# Patient Record
Sex: MALE | Race: OTHER | Employment: PART TIME | URBAN - METROPOLITAN AREA
[De-identification: names, ages, dates, MRNs, and addresses within clinical notes are randomized per-mention and may not be internally consistent; named-entity substitution may affect disease eponyms.]

---

## 2018-07-27 ENCOUNTER — OFFICE VISIT (OUTPATIENT)
Dept: FAMILY MEDICINE CLINIC | Age: 48
End: 2018-07-27

## 2018-07-27 VITALS
DIASTOLIC BLOOD PRESSURE: 66 MMHG | BODY MASS INDEX: 23.9 KG/M2 | WEIGHT: 140 LBS | SYSTOLIC BLOOD PRESSURE: 99 MMHG | HEIGHT: 64 IN | TEMPERATURE: 98.6 F | HEART RATE: 76 BPM

## 2018-07-27 DIAGNOSIS — S48.912A AMPUTATION OF LEFT ARM (HCC): Primary | ICD-10-CM

## 2018-07-27 NOTE — PROGRESS NOTES
Coordination of Care 1. Have you been to the ER, urgent care clinic since your last visit? Hospitalized since your last visit? Yes When: 7/26/18  [facility]  Wound Care 2. Have you seen or consulted any other health care providers outside of the [address] [place] [address] since your last visit? Include any pap smears or colon screening. No 
 
Does the patient need refills? YES Learning Assessment Complete? yes No results found for this or any previous visit.

## 2018-07-27 NOTE — PROGRESS NOTES
AVS printed, reviewed and given. Discussed Access Now referral process and patient aware that he will have to meet with MAY Gutierrez to complete AN application to see Ortho. Copies made of [facility] ER visit, and will send to the office to be scanned in to patient's chart. Patient directed to registration to make appt. With julián and 2 week f/u appt. With provider. Patient verbalizes understanding.  Micheal Knapp RN
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
## 2018-07-31 ENCOUNTER — TELEPHONE (OUTPATIENT)
Dept: FAMILY MEDICINE CLINIC | Age: 48
End: 2018-07-31

## 2018-07-31 NOTE — TELEPHONE ENCOUNTER
----- Message from Azul Orellana MD sent at 7/27/2018  1:02 PM EDT -----  Regarding: needs xray before ortho appt, and need records from [facility] you help with getting the records while Zepeda Felicita is out?   I put in order for xray, forgot we had to get one for ortho referral.
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
## 2018-08-03 ENCOUNTER — TELEPHONE (OUTPATIENT)
Dept: FAMILY MEDICINE CLINIC | Age: 48
End: 2018-08-03

## 2018-08-14 NOTE — TELEPHONE ENCOUNTER
Attempted to reach by phone. Pt number \"not accepting calls\". Emergency contact phone number accepting and general message left asking him to have pt call [facility] office nurse. Letter done asking pt to call [facility] office nurse. Has scheduled appt on 8/16/18 w/ HINA Ivanna Cisneros. Routing to Afshan Handley and Ivanna Cisneros for review.

## 2018-08-16 ENCOUNTER — HOSPITAL ENCOUNTER (INPATIENT)
Age: 48
LOS: 8 days | Discharge: ANOTHER HOSPITAL | DRG: 565 | End: 2018-08-24
Attending: EMERGENCY MEDICINE | Admitting: INTERNAL MEDICINE
Payer: SELF-PAY

## 2018-08-16 ENCOUNTER — APPOINTMENT (OUTPATIENT)
Dept: CT IMAGING | Age: 48
DRG: 565 | End: 2018-08-16
Attending: EMERGENCY MEDICINE
Payer: SELF-PAY

## 2018-08-16 ENCOUNTER — OFFICE VISIT (OUTPATIENT)
Dept: FAMILY MEDICINE CLINIC | Age: 48
End: 2018-08-16

## 2018-08-16 ENCOUNTER — APPOINTMENT (OUTPATIENT)
Dept: GENERAL RADIOLOGY | Age: 48
DRG: 565 | End: 2018-08-16
Attending: EMERGENCY MEDICINE
Payer: SELF-PAY

## 2018-08-16 ENCOUNTER — HOSPITAL ENCOUNTER (OUTPATIENT)
Dept: LAB | Age: 48
Discharge: HOME | End: 2018-08-16

## 2018-08-16 VITALS
HEART RATE: 90 BPM | TEMPERATURE: 98.8 F | DIASTOLIC BLOOD PRESSURE: 64 MMHG | WEIGHT: 144 LBS | SYSTOLIC BLOOD PRESSURE: 118 MMHG | BODY MASS INDEX: 24.72 KG/M2

## 2018-08-16 DIAGNOSIS — M86.121 ACUTE OSTEOMYELITIS OF RIGHT UPPER ARM (HCC): Primary | ICD-10-CM

## 2018-08-16 DIAGNOSIS — S48.912A AMPUTATION OF LEFT ARM (HCC): ICD-10-CM

## 2018-08-16 DIAGNOSIS — T14.8XXA INFECTED WOUND: ICD-10-CM

## 2018-08-16 DIAGNOSIS — L08.9 INFECTED WOUND: Primary | ICD-10-CM

## 2018-08-16 DIAGNOSIS — L08.9 INFECTED WOUND: ICD-10-CM

## 2018-08-16 DIAGNOSIS — T14.8XXA INFECTED WOUND: Primary | ICD-10-CM

## 2018-08-16 PROBLEM — M86.9 OSTEOMYELITIS (HCC): Status: ACTIVE | Noted: 2018-08-16

## 2018-08-16 LAB
ALBUMIN SERPL-MCNC: 3.3 G/DL (ref 3.5–5)
ALBUMIN/GLOB SERPL: 0.8 {RATIO} (ref 1.1–2.2)
ALP SERPL-CCNC: 83 U/L (ref 45–117)
ALT SERPL-CCNC: 24 U/L (ref 12–78)
ANION GAP SERPL CALC-SCNC: 7 MMOL/L (ref 5–15)
AST SERPL-CCNC: 14 U/L (ref 15–37)
BASOPHILS # BLD: 0 K/UL (ref 0–0.1)
BASOPHILS NFR BLD: 0 % (ref 0–1)
BILIRUB SERPL-MCNC: 0.2 MG/DL (ref 0.2–1)
BUN SERPL-MCNC: 13 MG/DL (ref 6–20)
BUN/CREAT SERPL: 17 (ref 12–20)
CALCIUM SERPL-MCNC: 8.4 MG/DL (ref 8.5–10.1)
CHLORIDE SERPL-SCNC: 108 MMOL/L (ref 97–108)
CO2 SERPL-SCNC: 28 MMOL/L (ref 21–32)
COMMENT, HOLDF: NORMAL
CREAT SERPL-MCNC: 0.76 MG/DL (ref 0.7–1.3)
DIFFERENTIAL METHOD BLD: NORMAL
EOSINOPHIL # BLD: 0.1 K/UL (ref 0–0.4)
EOSINOPHIL NFR BLD: 2 % (ref 0–7)
ERYTHROCYTE [DISTWIDTH] IN BLOOD BY AUTOMATED COUNT: 14 % (ref 11.5–14.5)
GLOBULIN SER CALC-MCNC: 4.2 G/DL (ref 2–4)
GLUCOSE SERPL-MCNC: 100 MG/DL (ref 65–100)
HCT VFR BLD AUTO: 37.8 % (ref 36.6–50.3)
HGB BLD-MCNC: 12.2 G/DL (ref 12.1–17)
IMM GRANULOCYTES # BLD: 0 K/UL (ref 0–0.04)
IMM GRANULOCYTES NFR BLD AUTO: 0 % (ref 0–0.5)
LACTATE SERPL-SCNC: 0.8 MMOL/L (ref 0.4–2)
LYMPHOCYTES # BLD: 1.6 K/UL (ref 0.8–3.5)
LYMPHOCYTES NFR BLD: 33 % (ref 12–49)
MCH RBC QN AUTO: 29.7 PG (ref 26–34)
MCHC RBC AUTO-ENTMCNC: 32.3 G/DL (ref 30–36.5)
MCV RBC AUTO: 92 FL (ref 80–99)
MONOCYTES # BLD: 0.5 K/UL (ref 0–1)
MONOCYTES NFR BLD: 11 % (ref 5–13)
NEUTS SEG # BLD: 2.6 K/UL (ref 1.8–8)
NEUTS SEG NFR BLD: 54 % (ref 32–75)
NRBC # BLD: 0 K/UL (ref 0–0.01)
NRBC BLD-RTO: 0 PER 100 WBC
PLATELET # BLD AUTO: 296 K/UL (ref 150–400)
PMV BLD AUTO: 8.9 FL (ref 8.9–12.9)
POTASSIUM SERPL-SCNC: 3.5 MMOL/L (ref 3.5–5.1)
PROT SERPL-MCNC: 7.5 G/DL (ref 6.4–8.2)
RBC # BLD AUTO: 4.11 M/UL (ref 4.1–5.7)
SAMPLES BEING HELD,HOLD: NORMAL
SODIUM SERPL-SCNC: 143 MMOL/L (ref 136–145)
WBC # BLD AUTO: 4.8 K/UL (ref 4.1–11.1)

## 2018-08-16 PROCEDURE — 36415 COLL VENOUS BLD VENIPUNCTURE: CPT | Performed by: EMERGENCY MEDICINE

## 2018-08-16 PROCEDURE — 87205 SMEAR GRAM STAIN: CPT | Performed by: EMERGENCY MEDICINE

## 2018-08-16 PROCEDURE — 65270000029 HC RM PRIVATE

## 2018-08-16 PROCEDURE — 87077 CULTURE AEROBIC IDENTIFY: CPT | Performed by: EMERGENCY MEDICINE

## 2018-08-16 PROCEDURE — 87205 SMEAR GRAM STAIN: CPT | Performed by: FAMILY MEDICINE

## 2018-08-16 PROCEDURE — 74011636320 HC RX REV CODE- 636/320: Performed by: EMERGENCY MEDICINE

## 2018-08-16 PROCEDURE — 73201 CT UPPER EXTREMITY W/DYE: CPT

## 2018-08-16 PROCEDURE — 74011250636 HC RX REV CODE- 250/636: Performed by: INTERNAL MEDICINE

## 2018-08-16 PROCEDURE — 80053 COMPREHEN METABOLIC PANEL: CPT | Performed by: EMERGENCY MEDICINE

## 2018-08-16 PROCEDURE — 83605 ASSAY OF LACTIC ACID: CPT | Performed by: EMERGENCY MEDICINE

## 2018-08-16 PROCEDURE — 74011250636 HC RX REV CODE- 250/636: Performed by: EMERGENCY MEDICINE

## 2018-08-16 PROCEDURE — 99285 EMERGENCY DEPT VISIT HI MDM: CPT

## 2018-08-16 PROCEDURE — 74011250637 HC RX REV CODE- 250/637: Performed by: INTERNAL MEDICINE

## 2018-08-16 PROCEDURE — 87077 CULTURE AEROBIC IDENTIFY: CPT | Performed by: FAMILY MEDICINE

## 2018-08-16 PROCEDURE — 73020 X-RAY EXAM OF SHOULDER: CPT

## 2018-08-16 PROCEDURE — 87186 SC STD MICRODIL/AGAR DIL: CPT | Performed by: EMERGENCY MEDICINE

## 2018-08-16 PROCEDURE — 87040 BLOOD CULTURE FOR BACTERIA: CPT | Performed by: EMERGENCY MEDICINE

## 2018-08-16 PROCEDURE — 87186 SC STD MICRODIL/AGAR DIL: CPT | Performed by: FAMILY MEDICINE

## 2018-08-16 PROCEDURE — 74011000258 HC RX REV CODE- 258: Performed by: INTERNAL MEDICINE

## 2018-08-16 PROCEDURE — 74011636320 HC RX REV CODE- 636/320

## 2018-08-16 PROCEDURE — 85025 COMPLETE CBC W/AUTO DIFF WBC: CPT | Performed by: EMERGENCY MEDICINE

## 2018-08-16 RX ORDER — DIPHENHYDRAMINE HCL 25 MG
25 CAPSULE ORAL
Status: DISCONTINUED | OUTPATIENT
Start: 2018-08-16 | End: 2018-08-24 | Stop reason: HOSPADM

## 2018-08-16 RX ORDER — SODIUM CHLORIDE 0.9 % (FLUSH) 0.9 %
5-10 SYRINGE (ML) INJECTION AS NEEDED
Status: DISCONTINUED | OUTPATIENT
Start: 2018-08-16 | End: 2018-08-24 | Stop reason: HOSPADM

## 2018-08-16 RX ORDER — ONDANSETRON 2 MG/ML
4 INJECTION INTRAMUSCULAR; INTRAVENOUS
Status: DISCONTINUED | OUTPATIENT
Start: 2018-08-16 | End: 2018-08-24 | Stop reason: HOSPADM

## 2018-08-16 RX ORDER — ACETAMINOPHEN 325 MG/1
650 TABLET ORAL
Status: DISCONTINUED | OUTPATIENT
Start: 2018-08-16 | End: 2018-08-24 | Stop reason: HOSPADM

## 2018-08-16 RX ORDER — HEPARIN SODIUM 5000 [USP'U]/ML
5000 INJECTION, SOLUTION INTRAVENOUS; SUBCUTANEOUS EVERY 8 HOURS
Status: DISCONTINUED | OUTPATIENT
Start: 2018-08-16 | End: 2018-08-22

## 2018-08-16 RX ORDER — NALOXONE HYDROCHLORIDE 0.4 MG/ML
0.4 INJECTION, SOLUTION INTRAMUSCULAR; INTRAVENOUS; SUBCUTANEOUS AS NEEDED
Status: DISCONTINUED | OUTPATIENT
Start: 2018-08-16 | End: 2018-08-24 | Stop reason: HOSPADM

## 2018-08-16 RX ORDER — SODIUM CHLORIDE 0.9 % (FLUSH) 0.9 %
5-10 SYRINGE (ML) INJECTION EVERY 8 HOURS
Status: DISCONTINUED | OUTPATIENT
Start: 2018-08-16 | End: 2018-08-24 | Stop reason: HOSPADM

## 2018-08-16 RX ORDER — HYDROCODONE BITARTRATE AND ACETAMINOPHEN 5; 325 MG/1; MG/1
1 TABLET ORAL
Status: DISCONTINUED | OUTPATIENT
Start: 2018-08-16 | End: 2018-08-24 | Stop reason: HOSPADM

## 2018-08-16 RX ORDER — OXYCODONE AND ACETAMINOPHEN 5; 325 MG/1; MG/1
TABLET ORAL
Refills: 0 | COMMUNITY
Start: 2018-07-30 | End: 2018-08-24

## 2018-08-16 RX ORDER — METRONIDAZOLE 500 MG/1
500 TABLET ORAL 3 TIMES DAILY
COMMUNITY
End: 2018-08-24

## 2018-08-16 RX ADMIN — Medication 10 ML: at 21:53

## 2018-08-16 RX ADMIN — SODIUM CHLORIDE 3.38 G: 900 INJECTION, SOLUTION INTRAVENOUS at 17:06

## 2018-08-16 RX ADMIN — IOPAMIDOL 100 ML: 612 INJECTION, SOLUTION INTRAVENOUS at 15:47

## 2018-08-16 RX ADMIN — HYDROCODONE BITARTRATE AND ACETAMINOPHEN 1 TABLET: 5; 325 TABLET ORAL at 21:57

## 2018-08-16 RX ADMIN — IOPAMIDOL 100 ML: 612 INJECTION, SOLUTION INTRAVENOUS at 15:45

## 2018-08-16 RX ADMIN — VANCOMYCIN HYDROCHLORIDE 1500 MG: 10 INJECTION, POWDER, LYOPHILIZED, FOR SOLUTION INTRAVENOUS at 17:37

## 2018-08-16 RX ADMIN — HYDROCODONE BITARTRATE AND ACETAMINOPHEN 1 TABLET: 5; 325 TABLET ORAL at 18:59

## 2018-08-16 RX ADMIN — HEPARIN SODIUM 5000 UNITS: 5000 INJECTION INTRAVENOUS; SUBCUTANEOUS at 21:51
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
## 2018-08-16 NOTE — PROGRESS NOTES
[address] Dosing Services: Antimicrobial Stewardship Progress Note  Auto-consult for Vancomycin dosiing  Physician: Dr Tereso Rodriguez  Indication: Osteomyelitis  Day of Therapy: 1    Plan:  Vancomycin therapy:  Loading dose: Vancomycin 1500 mg IV x1 dose in ED  Maintenance dose: Vancomycin 1000 mg IV q12hr  Dose calculated to approximate a therapeutic trough of 15-20 mcg/mL. Last trough level / Plan for level: after 3rd dose  Pharmacy to follow daily and will make changes to dose and/or frequency based on clinical status. Other Antimicrobial  (not dosed by pharmacist)   Zosyn 3.375 gm IV q8hr   Cultures     Arm and Blood cultures pending   Serum Creatinine     Lab Results   Component Value Date/Time    Creatinine 0.76 08/16/2018 02:08 PM       Creatinine Clearance Estimated Creatinine Clearance: 97.4 mL/min (based on Cr of 0.76).      Temp   98.3 °F (36.8 °C)    WBC   Lab Results   Component Value Date/Time    WBC 4.8 08/16/2018 02:08 PM       H/H   Lab Results   Component Value Date/Time    HGB 12.2 08/16/2018 02:08 PM        Platelets   Lab Results   Component Value Date/Time    PLATELET 609 00/93/1758 02:08 PM        Pharmacist: Signed Dick Nowakt Cancino Contact information: [phone]

## 2018-08-16 NOTE — ROUTINE PROCESS
1825- TRANSFER - IN REPORT:    Verbal report received from Francia Martinez RN (name) on Giuliano Landa  being received from ED (unit) for routine progression of care      Report consisted of patients Situation, Background, Assessment and   Recommendations(SBAR). Information from the following report(s) SBAR, Kardex, Intake/Output, MAR and Recent Results was reviewed with the receiving nurse. Opportunity for questions and clarification was provided. Assessment completed upon patients arrival to unit and care assumed. Molly Monique RN     Primary Nurse Molly Monique and Roz Aguilar RN performed a dual skin assessment on this patient Impairment noted- see wound doc flow sheet  [place] [id number] [facility], RN           8290- Bedside and Verbal shift change report given to YARELIS Jackson  (oncoming nurse) by Molly Monique RN and Roz Aguilar RN (offgoing nurse). Report included the following information SBAR, Kardex, Intake/Output, MAR, Recent Results and Cardiac Rhythm NSR.  [facility]

## 2018-08-16 NOTE — PROGRESS NOTES
Coordination of Care  1. Have you been to the ER, urgent care clinic since your last visit? Hospitalized since your last visit? Yes When: 7/29/18 [facility]    2. Have you seen or consulted any other health care providers outside of the [address] [place] [address] since your last visit? Include any pap smears or colon screening. No    Does the patient need refills? N/A    Learning Assessment Complete?  yes
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
## 2018-08-16 NOTE — PROGRESS NOTES
[facility]: MED RECONCILIATION    Information obtained from: Patient and his medication bottles     Allergies: Review of patient's allergies indicates no known allergies. Comment:  - Metronidazole 500 mg TID : Patient had a bottle filled at [facility] in [place], [place] on 7-17-18  for 93 tablets. Bottle was half full. So he might not have been taking it as prescribed. Prior to Admission Medications:     Medication Documentation Review Audit       Reviewed by Ruslan Waggoner PHARMD (Pharmacist) on 08/16/18 at 1725         Medication Sig Documenting Provider Last Dose Status Taking?      metroNIDAZOLE (FLAGYL) 500 mg tablet Take 500 mg by mouth three (3) times daily.  Historical Provider  Active Yes    oxyCODONE-acetaminophen (PERCOCET) 5-325 mg per tablet TK 1 TO 2 TS PO Q 4 TO 6 H PRN P Historical Provider  Active Yes                                     BRIAN WetzelD   Contact: [phone]

## 2018-08-17 LAB
BUN SERPL-MCNC: 11 MG/DL (ref 6–20)
CREAT SERPL-MCNC: 0.78 MG/DL (ref 0.7–1.3)

## 2018-08-17 PROCEDURE — 77030011254 HC DRSG HYDRGEL S&N -A

## 2018-08-17 PROCEDURE — 82565 ASSAY OF CREATININE: CPT | Performed by: INTERNAL MEDICINE

## 2018-08-17 PROCEDURE — 65270000029 HC RM PRIVATE

## 2018-08-17 PROCEDURE — 74011000258 HC RX REV CODE- 258: Performed by: INTERNAL MEDICINE

## 2018-08-17 PROCEDURE — 84520 ASSAY OF UREA NITROGEN: CPT | Performed by: INTERNAL MEDICINE

## 2018-08-17 PROCEDURE — 77030011251 HC DRSG HYDRCOIL S&N -A

## 2018-08-17 PROCEDURE — 36415 COLL VENOUS BLD VENIPUNCTURE: CPT | Performed by: INTERNAL MEDICINE

## 2018-08-17 PROCEDURE — 74011250636 HC RX REV CODE- 250/636: Performed by: INTERNAL MEDICINE

## 2018-08-17 PROCEDURE — 74011250637 HC RX REV CODE- 250/637: Performed by: INTERNAL MEDICINE

## 2018-08-17 RX ADMIN — HEPARIN SODIUM 5000 UNITS: 5000 INJECTION INTRAVENOUS; SUBCUTANEOUS at 05:27

## 2018-08-17 RX ADMIN — Medication 10 ML: at 14:10

## 2018-08-17 RX ADMIN — HEPARIN SODIUM 5000 UNITS: 5000 INJECTION INTRAVENOUS; SUBCUTANEOUS at 22:57

## 2018-08-17 RX ADMIN — Medication 10 ML: at 23:03

## 2018-08-17 RX ADMIN — HEPARIN SODIUM 5000 UNITS: 5000 INJECTION INTRAVENOUS; SUBCUTANEOUS at 14:09

## 2018-08-17 RX ADMIN — SODIUM CHLORIDE 3.38 G: 900 INJECTION, SOLUTION INTRAVENOUS at 08:53

## 2018-08-17 RX ADMIN — SODIUM CHLORIDE 3.38 G: 900 INJECTION, SOLUTION INTRAVENOUS at 00:36

## 2018-08-17 RX ADMIN — VANCOMYCIN HYDROCHLORIDE 1000 MG: 1 INJECTION, POWDER, LYOPHILIZED, FOR SOLUTION INTRAVENOUS at 05:25

## 2018-08-17 RX ADMIN — HYDROCODONE BITARTRATE AND ACETAMINOPHEN 1 TABLET: 5; 325 TABLET ORAL at 09:01

## 2018-08-17 RX ADMIN — HYDROCODONE BITARTRATE AND ACETAMINOPHEN 1 TABLET: 5; 325 TABLET ORAL at 23:02

## 2018-08-17 RX ADMIN — Medication 10 ML: at 05:35

## 2018-08-17 RX ADMIN — VANCOMYCIN HYDROCHLORIDE 1000 MG: 1 INJECTION, POWDER, LYOPHILIZED, FOR SOLUTION INTRAVENOUS at 17:55

## 2018-08-17 RX ADMIN — SODIUM CHLORIDE 3.38 G: 900 INJECTION, SOLUTION INTRAVENOUS at 18:55

## 2018-08-17 RX ADMIN — HYDROCODONE BITARTRATE AND ACETAMINOPHEN 1 TABLET: 5; 325 TABLET ORAL at 05:27

## 2018-08-17 NOTE — WOUND CARE
Wound care consult to assess the left upper arm residual limb present on admission. Plan of care and assesment discussed with Ortho NP - NOÉ Rai prior to care given. Language line used to communicate plan of care with patient- verbalized understanding and questions answered. Assessment  Left upper residual arm limb- skin graft to anterior and posterior surgical wound- anterior area is dry with large areas of dry adherent thick tan scabs, areas of red hypergranulated tissue scattered- sanguinous drainage- upper arm at the posterior axilla has a small area- less than 2x2 cm with dry black adherent eshar- edges area moist and lifting, yellow drainage- no odor. Posterior graft is red and granulating, edges pink, no drainage- yung wound is scarred. Treatment  Wounds cleansed, moisturized- intrasite to all open areas- covered with adaptic and dry dressing - tolerated well. Orders written for daily wound care per NP orders. Staff advised of care given. Will follow up on 8/20    Recommendations-   Refer to wound care clinic for long term care and assessment of wound.   Daily wound care as ordered/  [address]

## 2018-08-17 NOTE — PROGRESS NOTES
Problem: Falls - Risk of  Goal: *Absence of Falls  Document Noemy Fall Risk and appropriate interventions in the flowsheet.    Outcome: Progressing Towards Goal  Fall Risk Interventions:            Medication Interventions: Bed/chair exit alarm, Patient to call before getting OOB, Teach patient to arise slowly

## 2018-08-17 NOTE — PROGRESS NOTES
Bedside and Verbal shift change report given to YARELIS Vicente (oncoming nurse) by Ana Marin (offgoing nurse). Report included the following information SBAR and Kardex.
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
## 2018-08-17 NOTE — PROGRESS NOTES
Bedside and Verbal shift change report given to Edgar Gaston RN (oncoming nurse) by Anali Inman RN (offgoing nurse). Report included the following information SBAR, Kardex, Intake/Output, MAR, Accordion, Recent Results and Med Rec Status.
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
## 2018-08-18 LAB
BACTERIA SPEC CULT: ABNORMAL
BUN SERPL-MCNC: 8 MG/DL (ref 6–20)
CREAT SERPL-MCNC: 0.6 MG/DL (ref 0.7–1.3)
DATE LAST DOSE: NORMAL
GRAM STN SPEC: ABNORMAL
GRAM STN SPEC: ABNORMAL
REPORTED DOSE,DOSE: NORMAL UNITS
REPORTED DOSE/TIME,TMG: NORMAL
SERVICE CMNT-IMP: ABNORMAL
VANCOMYCIN TROUGH SERPL-MCNC: 7.2 UG/ML (ref 5–10)

## 2018-08-18 PROCEDURE — 74011250636 HC RX REV CODE- 250/636: Performed by: INTERNAL MEDICINE

## 2018-08-18 PROCEDURE — 74011000258 HC RX REV CODE- 258: Performed by: INTERNAL MEDICINE

## 2018-08-18 PROCEDURE — 65270000029 HC RM PRIVATE

## 2018-08-18 PROCEDURE — 84520 ASSAY OF UREA NITROGEN: CPT | Performed by: INTERNAL MEDICINE

## 2018-08-18 PROCEDURE — 36415 COLL VENOUS BLD VENIPUNCTURE: CPT | Performed by: INTERNAL MEDICINE

## 2018-08-18 PROCEDURE — 74011250637 HC RX REV CODE- 250/637: Performed by: INTERNAL MEDICINE

## 2018-08-18 PROCEDURE — 80202 ASSAY OF VANCOMYCIN: CPT | Performed by: INTERNAL MEDICINE

## 2018-08-18 PROCEDURE — 82565 ASSAY OF CREATININE: CPT | Performed by: INTERNAL MEDICINE

## 2018-08-18 RX ADMIN — HEPARIN SODIUM 5000 UNITS: 5000 INJECTION INTRAVENOUS; SUBCUTANEOUS at 06:43

## 2018-08-18 RX ADMIN — VANCOMYCIN HYDROCHLORIDE 1000 MG: 1 INJECTION, POWDER, LYOPHILIZED, FOR SOLUTION INTRAVENOUS at 23:11

## 2018-08-18 RX ADMIN — SODIUM CHLORIDE 3.38 G: 900 INJECTION, SOLUTION INTRAVENOUS at 01:05

## 2018-08-18 RX ADMIN — VANCOMYCIN HYDROCHLORIDE 1000 MG: 1 INJECTION, POWDER, LYOPHILIZED, FOR SOLUTION INTRAVENOUS at 06:41

## 2018-08-18 RX ADMIN — HYDROCODONE BITARTRATE AND ACETAMINOPHEN 1 TABLET: 5; 325 TABLET ORAL at 06:43

## 2018-08-18 RX ADMIN — VANCOMYCIN HYDROCHLORIDE 1000 MG: 1 INJECTION, POWDER, LYOPHILIZED, FOR SOLUTION INTRAVENOUS at 14:11

## 2018-08-18 RX ADMIN — Medication 10 ML: at 23:12

## 2018-08-18 RX ADMIN — Medication 10 ML: at 14:15

## 2018-08-18 RX ADMIN — SODIUM CHLORIDE 3.38 G: 900 INJECTION, SOLUTION INTRAVENOUS at 08:16

## 2018-08-18 RX ADMIN — HEPARIN SODIUM 5000 UNITS: 5000 INJECTION INTRAVENOUS; SUBCUTANEOUS at 14:15

## 2018-08-18 RX ADMIN — SODIUM CHLORIDE 3.38 G: 900 INJECTION, SOLUTION INTRAVENOUS at 17:17

## 2018-08-18 RX ADMIN — HEPARIN SODIUM 5000 UNITS: 5000 INJECTION INTRAVENOUS; SUBCUTANEOUS at 23:11

## 2018-08-18 RX ADMIN — Medication 10 ML: at 06:43

## 2018-08-18 NOTE — PROGRESS NOTES
Dontrell Mcintosh [facility] 79  566 [facility], [address] [place] [address]  [phone]      Medical Progress Note      NAME: Gregorio Pretty   :  1970  MRM:  [id number]    Date/Time: 2018  8:02 AM       Assessment and Plan:   Osteomyelitis of of LT humeral bone s/p traumatic amputation. Continue empiric zosyn and Vanco. BCx clear. MRSA screen pending. Wound Cx with heavy GNRs. Ortho consult appreciated. ID consult on Monday to help determine abx duration.          Subjective:     Chief Complaint:  Patient seen and examined. Chart reviewed. Patient interviewed using [employer] phone. He complains of ongoing left arm stump pain. ROS:  (bold if positive, if negative)      Tolerating PT  Tolerating Diet        Objective:     Last 24hrs VS reviewed since prior progress note. Most recent are:    Visit Vitals    /61 (BP 1 Location: Right arm, BP Patient Position: At rest)    Pulse 76    Temp 99.2 °F (37.3 °C)    Resp 16    Ht 5' 4\" (1.626 m)    Wt 65.3 kg (144 lb)    SpO2 98%    BMI 24.72 kg/m2     SpO2 Readings from Last 6 Encounters:   18 98%            Intake/Output Summary (Last 24 hours) at 18 0824  Last data filed at 18 0651   Gross per 24 hour   Intake              700 ml   Output                0 ml   Net              700 ml        Physical Exam:    Gen:  Well-developed, well-nourished, in no acute distress  HEENT:  Pink conjunctivae, PERRL, hearing intact to voice, moist mucous membranes  Neck:  Supple, without masses, thyroid non-tender  Resp:  No accessory muscle use, clear breath sounds without wheezes rales or rhonchi  Card:  No murmurs, normal S1, S2 without thrills, bruits or peripheral edema  Abd:  Soft, non-tender, non-distended, normoactive bowel sounds are present, no palpable organomegaly and no detectable hernias  Lymph:  No cervical or inguinal adenopathy  Musc:  LT humerus amputation.    Skin:  No rashes or ulcers, skin turgor is good  Neuro: Cranial nerves are grossly intact, no focal motor weakness, follows commands appropriately  Psych:  Good insight, oriented to person, place and time, alert  __________________________________________________________________  Medications Reviewed: (see below)  Medications:     Current Facility-Administered Medications   Medication Dose Route Frequency    Vancomycin - Please draw trough prior to dose due on 8/18 at 0600. Thanks! Other ONCE    piperacillin-tazobactam (ZOSYN) 3.375 g in 0.9% sodium chloride (MBP/ADV) 100 mL ADV  3.375 g IntraVENous Q8H    sodium chloride (NS) flush 5-10 mL  5-10 mL IntraVENous Q8H    sodium chloride (NS) flush 5-10 mL  5-10 mL IntraVENous PRN    acetaminophen (TYLENOL) tablet 650 mg  650 mg Oral Q4H PRN    HYDROcodone-acetaminophen (NORCO) 5-325 mg per tablet 1 Tab  1 Tab Oral Q4H PRN    diphenhydrAMINE (BENADRYL) capsule 25 mg  25 mg Oral Q4H PRN    naloxone (NARCAN) injection 0.4 mg  0.4 mg IntraVENous PRN    ondansetron (ZOFRAN) injection 4 mg  4 mg IntraVENous Q4H PRN    heparin (porcine) injection 5,000 Units  5,000 Units SubCUTAneous Q8H    vancomycin (VANCOCIN) 1,000 mg in 0.9% sodium chloride (MBP/ADV) 250 mL  1,000 mg IntraVENous Q12H        Lab Data Reviewed: (see below)  Lab Review:     Recent Labs      08/16/18   1408   WBC  4.8   HGB  12.2   HCT  37.8   PLT  296     Recent Labs      08/18/18   0510  08/17/18   0514  08/16/18   1408   NA   --    --   143   K   --    --   3.5   CL   --    --   108   CO2   --    --   28   GLU   --    --   100   BUN  8  11  13   CREA  0.60*  0.78  0.76   CA   --    --   8.4*   ALB   --    --   3.3*   TBILI   --    --   0.2   SGOT   --    --   14*   ALT   --    --   24     No results found for: GLUCPOC  No results for input(s): PH, PCO2, PO2, HCO3, FIO2 in the last 72 hours. No results for input(s): INR in the last 72 hours.     No lab exists for component: INREXT, INREXT  All Micro Results     Procedure Component Value Units Date/Time    CULTURE, Gris Ling STAIN [[id number]]  (Abnormal) Collected:  08/16/18 1741    Order Status:  Completed Specimen:  Wound from Arm Updated:  08/17/18 1326     Special Requests: LOOK FOR MRSA     GRAM STAIN NO WBC'S SEEN         NO ORGANISMS SEEN        Culture result:         LIGHT GRAM NEGATIVE RODS (A)    CULTURE, BLOOD [[id number]] Collected:  08/16/18 1412    Order Status:  Completed Specimen:  Blood from Blood Updated:  08/17/18 0816     Special Requests: NO SPECIAL REQUESTS        Culture result: NO GROWTH AFTER 17 HOURS       CULTURE, MRSA [[id number]]     Order Status:  Canceled Specimen:  Nares           I have reviewed notes of prior 24hr. Other pertinent lab:       Total time spent with patient: [address] discussed with: Patient, Nursing Staff and >50% of time spent in counseling and coordination of care    Discussed:  Care Plan    Prophylaxis:  SCD's    Disposition:  Home w/Family           ___________________________________________________    Attending Physician: Philly Elizabeth DO

## 2018-08-18 NOTE — PROGRESS NOTES
Orthopaedic Progress Note  Post Op day: * No surgery found *    2018 11:13 AM     Patient: Meg Paulson MRN: [id number]  SSN: [id number]    YOB: 1970  Age: 46 y.o. Sex: male      Admit date:  2018  Date of Surgery:  * No surgery found *   Procedures:    Admitting Physician:  Robert Farah MD   Surgeon:  * Surgery not found *    Consulting Physician(s): Treatment Team: Attending Provider: Jim Kraus DO; Consulting Provider: Robert Farah MD; Consulting Provider: Amberly Mcdermott NP; Consulting Provider: Sofia Valenzuela MD; Care Manager: Mary Bolden    SUBJECTIVE:     Meg Paulson is a 46 y.o. male resting in bed, eating breakfast with minimal complaints of pain. OBJECTIVE:       Physical Exam:  General: Alert, cooperative, no distress. Musculoskeletal: Left stump dressing removed. Dry scaly, sloughed skin better today after wound care yesterday. Several areas of hypergranulation/ no purulent drainage noted. Wound care done.      Vital Signs:      Patient Vitals for the past 8 hrs:   BP Temp Pulse Resp SpO2   18 0831 113/77 98.2 °F (36.8 °C) 63 16 99 %   18 0333 111/61 99.2 °F (37.3 °C) 76 16 98 %   18 0327 114/63 99.1 °F (37.3 °C) 74 15 96 %                                          Temp (24hrs), Av.8 °F (37.1 °C), Min:98.2 °F (36.8 °C), Max:99.4 °F (37.4 °C)      Labs:        Recent Labs      18   1408   HCT  37.8   HGB  12.2     Lab Results   Component Value Date/Time    Sodium 143 2018 02:08 PM    Potassium 3.5 2018 02:08 PM    Chloride 108 2018 02:08 PM    CO2 28 2018 02:08 PM    Glucose 100 2018 02:08 PM    BUN 8 2018 05:10 AM    Creatinine 0.60 (L) 2018 05:10 AM    Calcium 8.4 (L) 2018 02:08 PM       PT/OT:                Patient mobility                         ASSESSMENT / PLAN:   Principal Problem:    Osteomyelitis ([address] (2018)          Left stump wound improving with wound care and IV abx  No Orthopedic surgical needs at this time. IV abx ongoing/ daily wound care  Will need outpatient wound care clinic  ID to see Monday for antibiotics recommendations. Will sign off, please reconsult as needed.                  Signed By:  Richi Gutierrez NP    Orthopedic [place]  [facility]

## 2018-08-18 NOTE — PROGRESS NOTES
Bedside and Verbal shift change report given to YARELIS Gómez (oncoming nurse) by Warner KEE RN(offgoing nurse). Report included the following information SBAR.

## 2018-08-19 LAB
ANION GAP SERPL CALC-SCNC: 6 MMOL/L (ref 5–15)
BACTERIA SPEC CULT: ABNORMAL
BUN SERPL-MCNC: 6 MG/DL (ref 6–20)
BUN/CREAT SERPL: 10 (ref 12–20)
CALCIUM SERPL-MCNC: 8.6 MG/DL (ref 8.5–10.1)
CHLORIDE SERPL-SCNC: 106 MMOL/L (ref 97–108)
CO2 SERPL-SCNC: 27 MMOL/L (ref 21–32)
CREAT SERPL-MCNC: 0.58 MG/DL (ref 0.7–1.3)
GLUCOSE SERPL-MCNC: 101 MG/DL (ref 65–100)
GRAM STN SPEC: ABNORMAL
GRAM STN SPEC: ABNORMAL
POTASSIUM SERPL-SCNC: 3.5 MMOL/L (ref 3.5–5.1)
SERVICE CMNT-IMP: ABNORMAL
SODIUM SERPL-SCNC: 139 MMOL/L (ref 136–145)

## 2018-08-19 PROCEDURE — 80048 BASIC METABOLIC PNL TOTAL CA: CPT | Performed by: INTERNAL MEDICINE

## 2018-08-19 PROCEDURE — 74011250637 HC RX REV CODE- 250/637: Performed by: INTERNAL MEDICINE

## 2018-08-19 PROCEDURE — 74011000258 HC RX REV CODE- 258: Performed by: INTERNAL MEDICINE

## 2018-08-19 PROCEDURE — 36415 COLL VENOUS BLD VENIPUNCTURE: CPT | Performed by: INTERNAL MEDICINE

## 2018-08-19 PROCEDURE — 65270000029 HC RM PRIVATE

## 2018-08-19 PROCEDURE — 74011250636 HC RX REV CODE- 250/636: Performed by: INTERNAL MEDICINE

## 2018-08-19 RX ADMIN — HYDROCODONE BITARTRATE AND ACETAMINOPHEN 1 TABLET: 5; 325 TABLET ORAL at 21:10

## 2018-08-19 RX ADMIN — Medication 10 ML: at 21:11

## 2018-08-19 RX ADMIN — Medication 10 ML: at 06:02

## 2018-08-19 RX ADMIN — SODIUM CHLORIDE 3.38 G: 900 INJECTION, SOLUTION INTRAVENOUS at 09:23

## 2018-08-19 RX ADMIN — HEPARIN SODIUM 5000 UNITS: 5000 INJECTION INTRAVENOUS; SUBCUTANEOUS at 13:46

## 2018-08-19 RX ADMIN — SODIUM CHLORIDE 3.38 G: 900 INJECTION, SOLUTION INTRAVENOUS at 16:27

## 2018-08-19 RX ADMIN — VANCOMYCIN HYDROCHLORIDE 1000 MG: 1 INJECTION, POWDER, LYOPHILIZED, FOR SOLUTION INTRAVENOUS at 06:03

## 2018-08-19 RX ADMIN — Medication 10 ML: at 13:46

## 2018-08-19 RX ADMIN — SODIUM CHLORIDE 3.38 G: 900 INJECTION, SOLUTION INTRAVENOUS at 02:29

## 2018-08-19 RX ADMIN — HEPARIN SODIUM 5000 UNITS: 5000 INJECTION INTRAVENOUS; SUBCUTANEOUS at 06:01

## 2018-08-19 RX ADMIN — HYDROCODONE BITARTRATE AND ACETAMINOPHEN 1 TABLET: 5; 325 TABLET ORAL at 14:42

## 2018-08-19 RX ADMIN — HYDROCODONE BITARTRATE AND ACETAMINOPHEN 1 TABLET: 5; 325 TABLET ORAL at 06:03

## 2018-08-19 RX ADMIN — HEPARIN SODIUM 5000 UNITS: 5000 INJECTION INTRAVENOUS; SUBCUTANEOUS at 21:10

## 2018-08-19 NOTE — PROGRESS NOTES
Bedside and Verbal shift change report given to Edgar Gaston RN (oncoming nurse) by Alexis Mejia RN (offgoing nurse). Report included the following information SBAR, Kardex, Intake/Output, MAR, Accordion, Recent Results and Med Rec Status.

## 2018-08-19 NOTE — PROGRESS NOTES
Dontrell Mcintosh [facility] 79  566 [facility], [address] [place] [address]  [phone]      Medical Progress Note      NAME: Estrada Clinton   :  1970  MRM:  [id number]    Date/Time: 2018  8:02 AM       Assessment and Plan:   Osteomyelitis of of LT humeral bone s/p traumatic amputation. Wound cx with pseudomonas. Continue zosyn and stop vanco. BCx clear. Ortho consult appreciated. ID consult on Monday to help determine abx duration. Will need CM assistance for home wound check and PCP follow-up. Discussed car-a-van with patient.          Subjective:     Chief Complaint:  Patient seen and examined. Chart reviewed. Patient interviewed using [employer] phone. He reports left arm stump pain is improving    ROS:  (bold if positive, if negative)      Tolerating PT  Tolerating Diet        Objective:     Last 24hrs VS reviewed since prior progress note.  Most recent are:    Visit Vitals    /72 (BP 1 Location: Right arm, BP Patient Position: At rest)    Pulse 70    Temp 98.6 °F (37 °C)    Resp 16    Ht 5' 4\" (1.626 m)    Wt 65.3 kg (144 lb)    SpO2 98%    BMI 24.72 kg/m2     SpO2 Readings from Last 6 Encounters:   18 98%            Intake/Output Summary (Last 24 hours) at 18 8439  Last data filed at 18 0606   Gross per 24 hour   Intake              750 ml   Output                0 ml   Net              750 ml        Physical Exam:    Gen:  Well-developed, well-nourished, in no acute distress  HEENT:  Pink conjunctivae, PERRL, hearing intact to voice, moist mucous membranes  Neck:  Supple, without masses, thyroid non-tender  Resp:  No accessory muscle use, clear breath sounds without wheezes rales or rhonchi  Card:  No murmurs, normal S1, S2 without thrills, bruits or peripheral edema  Abd:  Soft, non-tender, non-distended, normoactive bowel sounds are present, no palpable organomegaly and no detectable hernias  Lymph:  No cervical or inguinal adenopathy  Musc:  LT humerus amputation. Skin:  No rashes or ulcers, skin turgor is good  Neuro:  Cranial nerves are grossly intact, no focal motor weakness, follows commands appropriately  Psych:  Good insight, oriented to person, place and time, alert  __________________________________________________________________  Medications Reviewed: (see below)  Medications:     Current Facility-Administered Medications   Medication Dose Route Frequency    piperacillin-tazobactam (ZOSYN) 3.375 g in 0.9% sodium chloride (MBP/ADV) 100 mL ADV  3.375 g IntraVENous Q8H    sodium chloride (NS) flush 5-10 mL  5-10 mL IntraVENous Q8H    sodium chloride (NS) flush 5-10 mL  5-10 mL IntraVENous PRN    acetaminophen (TYLENOL) tablet 650 mg  650 mg Oral Q4H PRN    HYDROcodone-acetaminophen (NORCO) 5-325 mg per tablet 1 Tab  1 Tab Oral Q4H PRN    diphenhydrAMINE (BENADRYL) capsule 25 mg  25 mg Oral Q4H PRN    naloxone (NARCAN) injection 0.4 mg  0.4 mg IntraVENous PRN    ondansetron (ZOFRAN) injection 4 mg  4 mg IntraVENous Q4H PRN    heparin (porcine) injection 5,000 Units  5,000 Units SubCUTAneous Q8H        Lab Data Reviewed: (see below)  Lab Review:     Recent Labs      08/16/18   1408   WBC  4.8   HGB  12.2   HCT  37.8   PLT  296     Recent Labs      08/19/18   0509  08/18/18   0510  08/17/18   0514  08/16/18   1408   NA  139   --    --   143   K  3.5   --    --   3.5   CL  106   --    --   108   CO2  27   --    --   28   GLU  101*   --    --   100   BUN  6  8  11  13   CREA  0.58*  0.60*  0.78  0.76   CA  8.6   --    --   8.4*   ALB   --    --    --   3.3*   TBILI   --    --    --   0.2   SGOT   --    --    --   14*   ALT   --    --    --   24     No results found for: GLUCPOC  No results for input(s): PH, PCO2, PO2, HCO3, FIO2 in the last 72 hours. No results for input(s): INR in the last 72 hours.     No lab exists for component: INREXT, INREXT  All Micro Results     Procedure Component Value Units Date/Time    CULTURE, BLOOD [[id number]] Collected:  08/16/18 1412    Order Status:  Completed Specimen:  Blood from Blood Updated:  08/19/18 0811     Special Requests: NO SPECIAL REQUESTS        Culture result: NO GROWTH 3 DAYS       CULTURE, WOUND Radha Shall STAIN [[id number]]  (Abnormal) Collected:  08/16/18 1741    Order Status:  Completed Specimen:  Wound from Arm Updated:  08/18/18 1156     Special Requests: LOOK FOR MRSA     GRAM STAIN NO WBC'S SEEN         NO ORGANISMS SEEN        Culture result:         LIGHT PSEUDOMONAS AERUGINOSA (A)              LIGHT POSSIBLE PSEUDOMONAS SPECIES , (2ND COLONY TYPE/STRAIN) (A)    CULTURE, MRSA [[id number]]     Order Status:  Canceled Specimen:  Nares           I have reviewed notes of prior 24hr. Other pertinent lab:       Total time spent with patient: [address] [place] [address] discussed with: Patient, Nursing Staff and >50% of time spent in counseling and coordination of care    Discussed:  Care Plan and D/C Planning    Prophylaxis:  SCD's    Disposition:  Home w/Family           ___________________________________________________    Attending Physician: Ros Cruz,

## 2018-08-19 NOTE — PROGRESS NOTES
Problem: Falls - Risk of  Goal: *Absence of Falls  Document Noemy Fall Risk and appropriate interventions in the flowsheet.    Outcome: Progressing Towards Goal  Fall Risk Interventions:            Medication Interventions: Patient to call before getting OOB, Teach patient to arise slowly

## 2018-08-20 ENCOUNTER — APPOINTMENT (OUTPATIENT)
Dept: MRI IMAGING | Age: 48
DRG: 565 | End: 2018-08-20
Attending: INTERNAL MEDICINE
Payer: SELF-PAY

## 2018-08-20 LAB
BUN SERPL-MCNC: 8 MG/DL (ref 6–20)
CREAT SERPL-MCNC: 0.63 MG/DL (ref 0.7–1.3)

## 2018-08-20 PROCEDURE — 74011000258 HC RX REV CODE- 258: Performed by: INTERNAL MEDICINE

## 2018-08-20 PROCEDURE — 77030011254 HC DRSG HYDRGEL S&N -A

## 2018-08-20 PROCEDURE — 82565 ASSAY OF CREATININE: CPT | Performed by: INTERNAL MEDICINE

## 2018-08-20 PROCEDURE — 74011250637 HC RX REV CODE- 250/637: Performed by: INTERNAL MEDICINE

## 2018-08-20 PROCEDURE — 65270000029 HC RM PRIVATE

## 2018-08-20 PROCEDURE — 84520 ASSAY OF UREA NITROGEN: CPT | Performed by: INTERNAL MEDICINE

## 2018-08-20 PROCEDURE — 74011250636 HC RX REV CODE- 250/636: Performed by: RADIOLOGY

## 2018-08-20 PROCEDURE — 36415 COLL VENOUS BLD VENIPUNCTURE: CPT | Performed by: INTERNAL MEDICINE

## 2018-08-20 PROCEDURE — A9575 INJ GADOTERATE MEGLUMI 0.1ML: HCPCS | Performed by: RADIOLOGY

## 2018-08-20 PROCEDURE — 74011250636 HC RX REV CODE- 250/636: Performed by: INTERNAL MEDICINE

## 2018-08-20 PROCEDURE — 73220 MRI UPPR EXTREMITY W/O&W/DYE: CPT

## 2018-08-20 RX ORDER — GADOTERATE MEGLUMINE 376.9 MG/ML
13 INJECTION INTRAVENOUS
Status: COMPLETED | OUTPATIENT
Start: 2018-08-20 | End: 2018-08-20

## 2018-08-20 RX ADMIN — HYDROCODONE BITARTRATE AND ACETAMINOPHEN 1 TABLET: 5; 325 TABLET ORAL at 11:15

## 2018-08-20 RX ADMIN — SODIUM CHLORIDE 3.38 G: 900 INJECTION, SOLUTION INTRAVENOUS at 09:38

## 2018-08-20 RX ADMIN — HYDROCODONE BITARTRATE AND ACETAMINOPHEN 1 TABLET: 5; 325 TABLET ORAL at 06:37

## 2018-08-20 RX ADMIN — HEPARIN SODIUM 5000 UNITS: 5000 INJECTION INTRAVENOUS; SUBCUTANEOUS at 06:37

## 2018-08-20 RX ADMIN — Medication 10 ML: at 22:06

## 2018-08-20 RX ADMIN — SODIUM CHLORIDE 3.38 G: 900 INJECTION, SOLUTION INTRAVENOUS at 01:18

## 2018-08-20 RX ADMIN — HYDROCODONE BITARTRATE AND ACETAMINOPHEN 1 TABLET: 5; 325 TABLET ORAL at 20:44

## 2018-08-20 RX ADMIN — HEPARIN SODIUM 5000 UNITS: 5000 INJECTION INTRAVENOUS; SUBCUTANEOUS at 13:49

## 2018-08-20 RX ADMIN — GADOTERATE MEGLUMINE 13 ML: 376.9 INJECTION INTRAVENOUS at 21:48

## 2018-08-20 RX ADMIN — SODIUM CHLORIDE 3.38 G: 900 INJECTION, SOLUTION INTRAVENOUS at 17:20

## 2018-08-20 RX ADMIN — Medication 10 ML: at 06:38

## 2018-08-20 RX ADMIN — HEPARIN SODIUM 5000 UNITS: 5000 INJECTION INTRAVENOUS; SUBCUTANEOUS at 22:06

## 2018-08-20 RX ADMIN — Medication 10 ML: at 13:52

## 2018-08-20 NOTE — PROGRESS NOTES
Dontrell Mcintosh [facility] 79  [address]  [phone]      Medical Progress Note      NAME: Davon Lara   :  1970  MRM:  [id number]    Date/Time: 2018  8:02 AM       Assessment and Plan:   Osteomyelitis of of LT humeral bone s/p traumatic amputation. Wound cx with pseudomonas with some resistance. Continue zosyn. BCx clear. Ortho consult appreciated. ID consult appreciated, formal note to follow but would prefer MRI upper extremity to say definitively if osteo or not. Not clear if we will do IV abx v. Surgical correction + abx.          Subjective:     Chief Complaint:  Patient seen and examined. Chart reviewed. Patient interviewed using [employer] phone. He reports left arm stump pain is improving    ROS:  (bold if positive, if negative)      Tolerating PT  Tolerating Diet        Objective:     Last 24hrs VS reviewed since prior progress note. Most recent are:    Visit Vitals    /59 (BP 1 Location: Right arm, BP Patient Position: At rest)    Pulse 68    Temp 97.9 °F (36.6 °C)    Resp 18    Ht 5' 4\" (1.626 m)    Wt 65.3 kg (144 lb)    SpO2 97%    BMI 24.72 kg/m2     SpO2 Readings from Last 6 Encounters:   18 97%          No intake or output data in the 24 hours ending 18 1030     Physical Exam:    Gen:  Well-developed, well-nourished, in no acute distress  HEENT:  Pink conjunctivae, PERRL, hearing intact to voice, moist mucous membranes  Neck:  Supple, without masses, thyroid non-tender  Resp:  No accessory muscle use, clear breath sounds without wheezes rales or rhonchi  Card:  No murmurs, normal S1, S2 without thrills, bruits or peripheral edema  Abd:  Soft, non-tender, non-distended, normoactive bowel sounds are present, no palpable organomegaly and no detectable hernias  Lymph:  No cervical or inguinal adenopathy  Musc:  LT humerus amputation.    Skin:  No rashes or ulcers, skin turgor is good  Neuro:  Cranial nerves are grossly intact, no focal motor weakness, follows commands appropriately  Psych:  Good insight, oriented to person, place and time, alert  __________________________________________________________________  Medications Reviewed: (see below)  Medications:     Current Facility-Administered Medications   Medication Dose Route Frequency    piperacillin-tazobactam (ZOSYN) 3.375 g in 0.9% sodium chloride (MBP/ADV) 100 mL ADV  3.375 g IntraVENous Q8H    sodium chloride (NS) flush 5-10 mL  5-10 mL IntraVENous Q8H    sodium chloride (NS) flush 5-10 mL  5-10 mL IntraVENous PRN    acetaminophen (TYLENOL) tablet 650 mg  650 mg Oral Q4H PRN    HYDROcodone-acetaminophen (NORCO) 5-325 mg per tablet 1 Tab  1 Tab Oral Q4H PRN    diphenhydrAMINE (BENADRYL) capsule 25 mg  25 mg Oral Q4H PRN    naloxone (NARCAN) injection 0.4 mg  0.4 mg IntraVENous PRN    ondansetron (ZOFRAN) injection 4 mg  4 mg IntraVENous Q4H PRN    heparin (porcine) injection 5,000 Units  5,000 Units SubCUTAneous Q8H        Lab Data Reviewed: (see below)  Lab Review:     No results for input(s): WBC, HGB, HCT, PLT, HGBEXT, HCTEXT, PLTEXT, HGBEXT, HCTEXT, PLTEXT in the last 72 hours. Recent Labs      08/20/18   0120  08/19/18   0509  08/18/18   0510   NA   --   139   --    K   --   3.5   --    CL   --   106   --    CO2   --   27   --    GLU   --   101*   --    BUN  8  6  8   CREA  0.63*  0.58*  0.60*   CA   --   8.6   --      No results found for: GLUCPOC  No results for input(s): PH, PCO2, PO2, HCO3, FIO2 in the last 72 hours. No results for input(s): INR in the last 72 hours.     No lab exists for component: Demario Fass  All Micro Results     Procedure Component Value Units Date/Time    CULTURE, BLOOD [[id number]] Collected:  08/16/18 1412    Order Status:  Completed Specimen:  Blood from Blood Updated:  08/20/18 0703     Special Requests: NO SPECIAL REQUESTS        Culture result: NO GROWTH 4 DAYS       CULTURE, WOUND Walker Fluke STAIN [[id number]]  (Abnormal) (Susceptibility) Collected:  08/16/18 1748    Order Status:  Completed Specimen:  Wound from Arm Updated:  08/19/18 6767     Special Requests: LOOK FOR MRSA     GRAM STAIN NO WBC'S SEEN         NO ORGANISMS SEEN        Culture result:         LIGHT PSEUDOMONAS AERUGINOSA (TWO DIFFERENT COLONY TYPES, SAME SENSITIVITIES) (A)    CULTURE, MRSA [[id number]]     Order Status:  Canceled Specimen:  Nares           I have reviewed notes of prior 24hr. Other pertinent lab:       Total time spent with patient: [address] [place] [address] discussed with: Patient, Care Manager, Nursing Staff, Consultant/Specialist and >50% of time spent in counseling and coordination of care    Discussed:  Care Plan and D/C Planning    Prophylaxis:  SCD's    Disposition:  Home w/Family           ___________________________________________________    Attending Physician: Ledy Beck,

## 2018-08-20 NOTE — ROUTINE PROCESS
Mri questionnaire done, patient unsure of many questions but to his knowledge answered no to everything.

## 2018-08-20 NOTE — PROGRESS NOTES
CM Note:  Order for home health noted. A referral was sent in [address] [place] [address] to [facility] for wound care and management of PICC line. He will need a PICC placed.   Awaiting IV abx order to send to Gary Gonzales RN

## 2018-08-20 NOTE — CONSULTS
Infectious Disease Consult    Impression/Plan   LUE wound s/p recent amputation. Will obtain MRI today to determine if OM present. Continue pip-tazo        Anti-infectives:       Subjective:   Date of Consultation:  2018  Date of Admission: 2018   Referring Physician:     Mr. Edie Cardona is a 46 y.o.  male who presented to the Emergency Department complaining of arm wound. Hx obtain from chart and  phone. He was sent here from care a van. He had a recent traumatic arm amputation at [facility]. Wound appears to be worsening. He had no home wound care help. CT in ER has a suggestion of osteomyelitis. Cultures growing pseudomonas. Patient Active Problem List   Diagnosis Code    [facility] M86.9     History reviewed. No pertinent past medical history. No family history on file. Social History   Substance Use Topics    Smoking status: Never Smoker    Smokeless tobacco: Never Used    Alcohol use No     Past Surgical History:   Procedure Laterality Date    HX AMPUTATION UPPER EXTREMITY        Prior to Admission medications    Medication Sig Start Date End Date Taking? Authorizing Provider   oxyCODONE-acetaminophen (PERCOCET) 5-325 mg per tablet TK 1 TO 2 TS PO Q 4 TO 6 H PRN P 18  Yes Historical Provider   metroNIDAZOLE (FLAGYL) 500 mg tablet Take 500 mg by mouth three (3) times daily. Yes Historical Provider     No Known Allergies     Review of Systems:  Pertinent items are noted in the History of Present Illness. Objective:   Blood pressure 118/64, pulse 90, temperature 98.8 °F (37.1 °C), resp. rate 16, height 5' 4\" (1.626 m), weight 65.3 kg (144 lb), SpO2 97 %. Temp (24hrs), Av.6 °F (37 °C), Min:97.9 °F (36.6 °C), Max:99.5 °F (37.5 °C)       Exam:    General:  Alert, cooperative   Eyes:  Sclera anicteric.    Mouth/Throat: Mucous membranes moist   Neck:    Lungs:   No distress   CV:     Abdomen:      Extremities: LUE wound without purulence or erythema Skin: No rash   Lymph nodes:    Musculoskeletal:    Lines/Devices:  Intact   Psych:        Data Review:   Recent Results (from the past 24 hour(s))   BUN    Collection Time: 08/20/18  1:20 AM   Result Value Ref Range    BUN 8 6 - 20 MG/DL   CREATININE    Collection Time: 08/20/18  1:20 AM   Result Value Ref Range    Creatinine 0.63 (L) 0.70 - 1.30 MG/DL    GFR est AA >60 >60 ml/min/1.73m2    GFR est non-AA >60 >60 ml/min/1.73m2        Microbiology:     Studies:     Signed By: Kanchan Zamorano DO     August 20, 2018

## 2018-08-20 NOTE — ROUTINE PROCESS
Bedside and Verbal shift change report given to YARELIS Aguilar by Devon Chew RN (offgoing nurse). Report included the following information SBAR, Kardex, Recent Results and Med Rec Status.

## 2018-08-20 NOTE — ROUTINE PROCESS
Bedside shift change report given to Yajaira (oncoming nurse) by Jami Ochoa (offgoing nurse). Report included the following information SBAR and MAR.

## 2018-08-20 NOTE — WOUND CARE
Wound care consult to reassess and change the dressing to the left upper arm residual limb present on admission. Assessment with Dr Hamzah Shane- ID. RN at bedside able to communicate care with patient in [place].     Assessment  Left upper residual arm limb- skin graft to anterior and posterior surgical wound- anterior area is less dry with large areas of dry adherent thick tan scabs, tissue under the scabs intact- scattered  areas of red hypergranulated tissue scattered- no drainage to  upper arm at the posterior axilla has a small area- less than 2x2 cm with dry black adherent eshar- edges area moist and lifting, yellow drainage- no odor. Posterior graft is red and granulating, edges pink, no drainage- yung wound is scarred.     Treatment  Wounds cleansed, moisturized- intrasite to all open areas- covered with adaptic and dry dressing - tolerated well.     Orders written for daily wound care per NP orders. Staff advised of care given. Will follow.     Recommendations-   Refer to wound care clinic for long term care and assessment of wound.   Daily wound care as ordered  Will follow  [address]

## 2018-08-20 NOTE — PROGRESS NOTES
Bedside and Verbal shift change report given to Efrain Pereira RN (oncoming nurse) by June Rodriguez RN (offgoing nurse). Report included the following information SBAR, Kardex, Intake/Output, MAR, Accordion, Recent Results, Med Rec Status and Cardiac Rhythm NSR.

## 2018-08-20 NOTE — DISCHARGE SUMMARY
Physician Interim Summary   Patient ID:  Johnie Chung  [id number]  46 y.o.  1970    PCP: Virgil Warren MD     Consults: ID and Orthopedic Surgery    Covering dates: 8/16/2018 through 8/20/2018    Admission Diagnoses: Osteomyelitis [facility])    Hospital Course:   Principal Problem:    Osteomyelitis ([address] (8/16/2018)      Osteomyelitis of of LT humeral bone s/p traumatic amputation. Wound cx with pseudomonas with some resistance. Continue zosyn. BCx clear. Ortho consult appreciated. ID consult appreciated, formal note to follow but would prefer MRI upper extremity to say definitively if osteo or not. Not clear if we will do IV abx v. Surgical correction + abx. Additional hospital course and discharge summary will be done by discharging physician.

## 2018-08-21 ENCOUNTER — TELEPHONE (OUTPATIENT)
Dept: FAMILY MEDICINE CLINIC | Age: 48
End: 2018-08-21

## 2018-08-21 LAB — CRP SERPL-MCNC: <0.29 MG/DL

## 2018-08-21 PROCEDURE — 74011250636 HC RX REV CODE- 250/636: Performed by: INTERNAL MEDICINE

## 2018-08-21 PROCEDURE — 77030011254 HC DRSG HYDRGEL S&N -A

## 2018-08-21 PROCEDURE — 74011000258 HC RX REV CODE- 258: Performed by: INTERNAL MEDICINE

## 2018-08-21 PROCEDURE — 36415 COLL VENOUS BLD VENIPUNCTURE: CPT | Performed by: INTERNAL MEDICINE

## 2018-08-21 PROCEDURE — 36569 INSJ PICC 5 YR+ W/O IMAGING: CPT | Performed by: INTERNAL MEDICINE

## 2018-08-21 PROCEDURE — 86140 C-REACTIVE PROTEIN: CPT | Performed by: INTERNAL MEDICINE

## 2018-08-21 PROCEDURE — 74011250637 HC RX REV CODE- 250/637: Performed by: INTERNAL MEDICINE

## 2018-08-21 PROCEDURE — 65270000029 HC RM PRIVATE

## 2018-08-21 RX ADMIN — VANCOMYCIN HYDROCHLORIDE 1500 MG: 10 INJECTION, POWDER, LYOPHILIZED, FOR SOLUTION INTRAVENOUS at 11:15

## 2018-08-21 RX ADMIN — HEPARIN SODIUM 5000 UNITS: 5000 INJECTION INTRAVENOUS; SUBCUTANEOUS at 06:21

## 2018-08-21 RX ADMIN — HEPARIN SODIUM 5000 UNITS: 5000 INJECTION INTRAVENOUS; SUBCUTANEOUS at 14:32

## 2018-08-21 RX ADMIN — HYDROCODONE BITARTRATE AND ACETAMINOPHEN 1 TABLET: 5; 325 TABLET ORAL at 21:47

## 2018-08-21 RX ADMIN — HEPARIN SODIUM 5000 UNITS: 5000 INJECTION INTRAVENOUS; SUBCUTANEOUS at 21:47

## 2018-08-21 RX ADMIN — Medication 10 ML: at 14:32

## 2018-08-21 RX ADMIN — SODIUM CHLORIDE 3.38 G: 900 INJECTION, SOLUTION INTRAVENOUS at 17:29

## 2018-08-21 RX ADMIN — SODIUM CHLORIDE 3.38 G: 900 INJECTION, SOLUTION INTRAVENOUS at 01:55

## 2018-08-21 RX ADMIN — SODIUM CHLORIDE 3.38 G: 900 INJECTION, SOLUTION INTRAVENOUS at 10:13

## 2018-08-21 RX ADMIN — Medication 10 ML: at 21:47

## 2018-08-21 RX ADMIN — HYDROCODONE BITARTRATE AND ACETAMINOPHEN 1 TABLET: 5; 325 TABLET ORAL at 11:31

## 2018-08-21 RX ADMIN — Medication 10 ML: at 06:21

## 2018-08-21 RX ADMIN — VANCOMYCIN HYDROCHLORIDE 1000 MG: 1 INJECTION, POWDER, LYOPHILIZED, FOR SOLUTION INTRAVENOUS at 19:41

## 2018-08-21 NOTE — ROUTINE PROCESS
Bedside shift change report given to Yajaira (oncoming nurse) by Vinod Chung (offgoing nurse). Report included the following information SBAR.

## 2018-08-21 NOTE — PROGRESS NOTES
VAT Note:  Chart reviewed for PICC placement evaluation. Areas reviewed include, but are not limited to, patient's current and past H&P, Lab and Procedure Results, all notes, media records and medications. PICC order acknowledged for LT Antbx. Patient with working PIV, will placed line prior to discharge. [phone] Spoke to YARELIS Aguilar for possibe time of discharge. Not planning on today so will place prior to discharge possibly tomorrow. YARELIS Aguilar to call [phone] with any updates.

## 2018-08-21 NOTE — TELEPHONE ENCOUNTER
Call from Duc Monzon,  at [facility], [phone]. She said this patient, Meg Paulson, needs IV home antibiotic and wound care so he needs an accepting physician.     Tam Maurice April

## 2018-08-21 NOTE — PROGRESS NOTES
[facility] Dosing Services: Antimicrobial Stewardship Daily Doc    Consult for antibiotic dosing of vancomycin by Dr. Trixie Riedel  Indication: osteo (confirmed on MRI)  Day of Therapy: vanc 8/16-8/19 (last dose 8/19 @ 0600 then dcd); restarted 8/21  Note: ID on, would like appropriate discharge regimen when available     Vancomycin therapy:  Current maintenance dose: 1000 (mg) every 8 hours   Dose calculated to approximate a therapeutic trough of 15-20 mcg/mL. Last trough level 6.2 mcg/ml on 1000 mg IV q 12 hours   Plan for level / Adjustment in [address] [place] [address] Ave to the 4th dose, 8/22 1130    Other Antimicrobial   (not dosed by pharmacist) Zosyn 3.375 g IV q 8 hours    Cultures 8/16 - Wound - heavy pseudomonas aeruginosa (final)  8/16 - Blood - NGTD P  8/16 - Arm Wound - light pseudomonas (final)   Serum Creatinine Lab Results   Component Value Date/Time    Creatinine 0.63 (L) 08/20/2018 01:20 AM         Creatinine Clearance Estimated Creatinine Clearance: 117.5 mL/min (based on Cr of 0.63).      Temp Temp: 98.6 °F (37 °C)       WBC Lab Results   Component Value Date/Time    WBC 4.8 08/16/2018 02:08 PM        H/H Lab Results   Component Value Date/Time    HGB 12.2 08/16/2018 02:08 PM        Platelets    Lab Results   Component Value Date/Time    PLATELET 241 20/53/7122 02:08 PM          Pharmacist Warren Felty, PHARMD Contact information: [phone]

## 2018-08-21 NOTE — PROGRESS NOTES
Dontrell Mcintosh amarjit [address]  [address]  [phone]      Medical Progress Note      NAME: Robson Valdez   :  1970  MRM:  [id number]    Date/Time: 2018  12:23 PM         Subjective:     Chief Complaint:  Arm wound: stable    ROS:  (bold if positive, if negative)                        Tolerating Diet          Objective:       Vitals:          Last 24hrs VS reviewed since prior progress note.  Most recent are:    Visit Vitals    /77 (BP 1 Location: Right arm, BP Patient Position: At rest)    Pulse 76    Temp 98.6 °F (37 °C)    Resp 18    Ht 5' 4\" (1.626 m)    Wt 65.3 kg (144 lb)    SpO2 97%    BMI 24.72 kg/m2     SpO2 Readings from Last 6 Encounters:   18 97%          Intake/Output Summary (Last 24 hours) at 18 1223  Last data filed at 18 2252   Gross per 24 hour   Intake              250 ml   Output                1 ml   Net              249 ml          Exam:     Physical Exam:    Gen:  Well-developed, well-nourished, in no acute distress  HEENT:  Pink conjunctivae, PERRL, hearing intact to voice, moist mucous membranes  Neck:  Supple, without masses, thyroid non-tender  Resp:  No accessory muscle use, clear breath sounds without wheezes rales or rhonchi  Card:  No murmurs, normal S1, S2 without thrills, bruits or peripheral edema, 2+ pedal pulses  Abd:  Soft, non-tender, non-distended, normoactive bowel sounds are present, no palpable organomegaly and no detectable hernias  Lymph:  No cervical or inguinal adenopathy  Musc:  No cyanosis or clubbing  Skin: left arm wound dressed, C/D/I  Neuro:  Cranial nerves are grossly intact, no focal motor weakness, follows commands appropriately  Psych:  Good insight, oriented to person, place and time, alert       Medications Reviewed: (see below)    Lab Data Reviewed: (see below)    ______________________________________________________________________    Medications:     Current Facility-Administered Medications   Medication Dose Route Frequency    vancomycin (VANCOCIN) 1,500 mg in 0.9% sodium chloride 500 mL IVPB  1,500 mg IntraVENous ONCE    vancomycin (VANCOCIN) 1,000 mg in 0.9% sodium chloride (MBP/ADV) 250 mL  1,000 mg IntraVENous Q8H    [START ON 8/22/2018] vancomycin: please obtain trough 8/22 @ 1130 (30 mins prior to noon dose)    Other ONCE    piperacillin-tazobactam (ZOSYN) 3.375 g in 0.9% sodium chloride (MBP/ADV) 100 mL ADV  3.375 g IntraVENous Q8H    sodium chloride (NS) flush 5-10 mL  5-10 mL IntraVENous Q8H    sodium chloride (NS) flush 5-10 mL  5-10 mL IntraVENous PRN    acetaminophen (TYLENOL) tablet 650 mg  650 mg Oral Q4H PRN    HYDROcodone-acetaminophen (NORCO) 5-325 mg per tablet 1 Tab  1 Tab Oral Q4H PRN    diphenhydrAMINE (BENADRYL) capsule 25 mg  25 mg Oral Q4H PRN    naloxone (NARCAN) injection 0.4 mg  0.4 mg IntraVENous PRN    ondansetron (ZOFRAN) injection 4 mg  4 mg IntraVENous Q4H PRN    heparin (porcine) injection 5,000 Units  5,000 Units SubCUTAneous Q8H            Lab Review:     No results for input(s): WBC, HGB, HCT, PLT, HGBEXT, HCTEXT, PLTEXT in the last 72 hours. Recent Labs      08/20/18   0120  08/19/18   0509   NA   --   139   K   --   3.5   CL   --   106   CO2   --   27   GLU   --   101*   BUN  8  6   CREA  0.63*  0.58*   CA   --   8.6     No results found for: GLUCPOC  No results for input(s): PH, PCO2, PO2, HCO3, FIO2 in the last 72 hours. No results for input(s): INR in the last 72 hours.     No lab exists for component: INREXT  No results found for: SDES  Lab Results   Component Value Date/Time    Culture result: (A) 08/16/2018 05:41 PM     LIGHT PSEUDOMONAS AERUGINOSA (TWO DIFFERENT COLONY TYPES, SAME SENSITIVITIES)    Culture result: NO GROWTH 5 DAYS 08/16/2018 02:12 PM    Culture result: HEAVY PSEUDOMONAS AERUGINOSA (A) 08/16/2018 01:40 PM            Assessment:     Principal Problem:    [facility] (8/16/2018) Plan:     Principal Problem:    [facility]omyel[facility] ([facility] (8/16/2018)   - prolonged course of IV antibiotics per Dr. Lynnette Dalton   - if this doesn't work, he will need more extensive amputation, possibly disarticulation at the shoulder   - PICC ordered   - no insurance, CM trying to arrange alternate placement for IV antibiotics   - MRI reviewed by me with distal osteomyelitis      Total time spent in patient care: 25 minutes                  Care Plan discussed with: Patient, Care Manager, Nursing Staff and Dr. Lynnette Dalton    Discussed:  Code Status, Care Plan and D/C Planning    Prophylaxis:  Hep SQ    Disposition:  CM to arrange           ___________________________________________________    Attending Physician: Courtney Zimmerman MD

## 2018-08-21 NOTE — PROGRESS NOTES
Bedside and Verbal shift change report given to YARELIS Aguilar (oncoming nurse) by Mary Johns RN (offgoing nurse). Report included the following information SBAR, Kardex, Intake/Output, MAR, Recent Results and Med Rec Status.

## 2018-08-21 NOTE — PROGRESS NOTES
[facility] Progress Note           Jasmin Mims DO    [phone] Office  [phone]  Fax    2018      Assessment & Plan:   1. Osteomyelitis of of LT humeral bone s/p traumatic amputation. Wound cx growing  pseudomonas with some resistance. This culture was NOT taken from area suspicious for OM. Plan 6 weeks empiric vanco/meropenem. Ortho consult appreciated          Subjective:     No complaints. Discussed IV abx at length via language line    Objective:     Vitals:   Visit Vitals    /77 (BP 1 Location: Right arm, BP Patient Position: At rest)    Pulse 76    Temp 98.6 °F (37 °C)    Resp 18    Ht 5' 4\" (1.626 m)    Wt 65.3 kg (144 lb)    SpO2 97%    BMI 24.72 kg/m2        Tmax:  Temp (24hrs), Av.7 °F (37.1 °C), Min:98.4 °F (36.9 °C), Max:98.9 °F (37.2 °C)      Exam:   Patient is intubated:  no    Physical Examination:   General:  Alert, cooperative, no distress   Head:  Normocephalic, atraumatic. Eyes:  Conjunctivae clear   Neck:        Lungs:   No distress. Chest wall:     Heart:     Abdomen:      Extremities: Moves all. No cyanosis or edema. Skin:  LUE dressed   Neurologic: CNII-XII intact. Normal strength     Labs:        No lab exists for component: ITNL   No results for input(s): CPK, CKMB, TROIQ in the last 72 hours. Recent Labs      18   0120  18   0509   NA   --   139   K   --   3.5   CL   --   106   CO2   --   27   BUN  8  6   CREA  0.63*  0.58*   GLU   --   101*     No results for input(s): INR, PTP, APTT in the last 72 hours.     No lab exists for component: INREXT  Needs: urine analysis, urine sodium, protein and creatinine  No results found for: JULIANA, CREAU      Cultures:     No results found for: SDES  Lab Results   Component Value Date/Time    Culture result: (A) 2018 05:41 PM     LIGHT PSEUDOMONAS AERUGINOSA (TWO DIFFERENT COLONY TYPES, SAME SENSITIVITIES)    Culture result: NO GROWTH 5 DAYS 2018 02:12 PM Culture result: HEAVY PSEUDOMONAS AERUGINOSA (A) 08/16/2018 01:40 PM       Radiology:     Medications       Current Facility-Administered Medications   Medication Dose Route Frequency Last Dose    piperacillin-tazobactam (ZOSYN) 3.375 g in 0.9% sodium chloride (MBP/ADV) 100 mL ADV  3.375 g IntraVENous Q8H 3.375 g at 08/21/18 1013    sodium chloride (NS) flush 5-10 mL  5-10 mL IntraVENous Q8H 10 mL at 08/21/18 0621    sodium chloride (NS) flush 5-10 mL  5-10 mL IntraVENous PRN      acetaminophen (TYLENOL) tablet 650 mg  650 mg Oral Q4H PRN      HYDROcodone-acetaminophen (NORCO) 5-325 mg per tablet 1 Tab  1 Tab Oral Q4H PRN 1 Tab at 08/20/18 2044    diphenhydrAMINE (BENADRYL) capsule 25 mg  25 mg Oral Q4H PRN      naloxone (NARCAN) injection 0.4 mg  0.4 mg IntraVENous PRN      ondansetron (ZOFRAN) injection 4 mg  4 mg IntraVENous Q4H PRN      heparin (porcine) injection 5,000 Units  5,000 Units SubCUTAneous Q8H 5,000 Units at 08/21/18 7305           Case discussed with: Pharmacy, CM, patient      Michael Keith DO

## 2018-08-21 NOTE — PROGRESS NOTES
CM Note:  [facility] unable to accept pt as there is no payer source; he will be on a complex schedule of IV abx and there is a language barrier. Complex Case Manager consulted for possible transfer to [facility] or a snf contract. Discussed with Laura Ford.   YARELIS Cordon

## 2018-08-21 NOTE — PROGRESS NOTES
Post Discharge PICC and Antibiotic Orders    1. Diagnosis:  OM humerus  2. Antibiotic:  Vancomycin 1000mg IV q 8 hours through 10/1/18                         Meropenem 2gm IV q 8 hours through 10/1/18  3. Routine PICC/ Kristan/ Portacath Care including PRN catheter flow management  4. Weekly labs:   _x__CBC/diff/platelets   _x__BUN/Creatinine   ___CPK   _x__AST/TotalBilirubin/AlkalinePhosphatase   _x__CRP   ___Gentamicin level  ___gentamicin peak/trough   Trough Vancomycin level ____goal 10-15 _x__goal 15-20  5. Fax lab to [phone]  Call Critical Lab Values to [phone]  6. May send to IR for line evaluation or replacement [phone]  7. Home Health to pull PIC line at end of therapy or send to IR for Kristan removal  8. Allergies:  No Known Allergies  9. Pharmacy Consult for Vancomycin dosing/gentamicin dosing.       Waylon Amaya DO

## 2018-08-21 NOTE — WOUND CARE
Wound care o reassess and change the dressing to the left upper arm residual limb present on admission. RN at bedside able to communicate care with patient in [address] [place] [address] Medicated for pain prior to care given.       Assessment  Left upper residual arm limb- skin graft to anterior and posterior surgical wound- anterior area is less dry with large areas of dry adherent thick tan scabs, tissue under the scabs intact- scattered  areas of red hypergranulated tissue scattered- no drainage to  upper arm at the posterior axilla has a small area- less than 2x2 cm with dry black adherent eshar- edges area moist and lifting, yellow drainage- no odor. Posterior graft is red and granulating, edges pink, no drainage- yung wound is scarred.      Treatment  Wounds cleansed, moisturized- intrasite to all open areas- covered with adaptic and dry dressing - tolerated well. Staff advised of care given.    Recommendations-   Refer to wound care clinic for long term care and assessment of wound. Daily wound care as ordered- may change to hydrofera blue prior to discharge. Plan of care discussed with Dr Damián Hawk and care manager.   Will follow  Brent Barbosa RN CWCN

## 2018-08-22 LAB
ANION GAP SERPL CALC-SCNC: 6 MMOL/L (ref 5–15)
BACTERIA SPEC CULT: NORMAL
BUN SERPL-MCNC: 12 MG/DL (ref 6–20)
BUN/CREAT SERPL: 16 (ref 12–20)
CALCIUM SERPL-MCNC: 8.6 MG/DL (ref 8.5–10.1)
CHLORIDE SERPL-SCNC: 106 MMOL/L (ref 97–108)
CO2 SERPL-SCNC: 28 MMOL/L (ref 21–32)
CREAT SERPL-MCNC: 0.73 MG/DL (ref 0.7–1.3)
DATE LAST DOSE: ABNORMAL
ERYTHROCYTE [DISTWIDTH] IN BLOOD BY AUTOMATED COUNT: 13.6 % (ref 11.5–14.5)
GLUCOSE SERPL-MCNC: 96 MG/DL (ref 65–100)
HCT VFR BLD AUTO: 36 % (ref 36.6–50.3)
HGB BLD-MCNC: 12 G/DL (ref 12.1–17)
MAGNESIUM SERPL-MCNC: 1.8 MG/DL (ref 1.6–2.4)
MCH RBC QN AUTO: 30.2 PG (ref 26–34)
MCHC RBC AUTO-ENTMCNC: 33.3 G/DL (ref 30–36.5)
MCV RBC AUTO: 90.5 FL (ref 80–99)
NRBC # BLD: 0 K/UL (ref 0–0.01)
NRBC BLD-RTO: 0 PER 100 WBC
PHOSPHATE SERPL-MCNC: 4.1 MG/DL (ref 2.6–4.7)
PLATELET # BLD AUTO: 276 K/UL (ref 150–400)
PMV BLD AUTO: 8.9 FL (ref 8.9–12.9)
POTASSIUM SERPL-SCNC: 3.7 MMOL/L (ref 3.5–5.1)
RBC # BLD AUTO: 3.98 M/UL (ref 4.1–5.7)
REPORTED DOSE,DOSE: ABNORMAL UNITS
REPORTED DOSE/TIME,TMG: ABNORMAL
SERVICE CMNT-IMP: NORMAL
SODIUM SERPL-SCNC: 140 MMOL/L (ref 136–145)
VANCOMYCIN TROUGH SERPL-MCNC: 13.5 UG/ML (ref 5–10)
WBC # BLD AUTO: 4.5 K/UL (ref 4.1–11.1)

## 2018-08-22 PROCEDURE — 74011250636 HC RX REV CODE- 250/636: Performed by: INTERNAL MEDICINE

## 2018-08-22 PROCEDURE — 84100 ASSAY OF PHOSPHORUS: CPT | Performed by: INTERNAL MEDICINE

## 2018-08-22 PROCEDURE — 80202 ASSAY OF VANCOMYCIN: CPT | Performed by: INTERNAL MEDICINE

## 2018-08-22 PROCEDURE — 77030018836 HC SOL IRR NACL ICUM -A

## 2018-08-22 PROCEDURE — 74011250637 HC RX REV CODE- 250/637: Performed by: INTERNAL MEDICINE

## 2018-08-22 PROCEDURE — 85027 COMPLETE CBC AUTOMATED: CPT | Performed by: INTERNAL MEDICINE

## 2018-08-22 PROCEDURE — 36415 COLL VENOUS BLD VENIPUNCTURE: CPT | Performed by: INTERNAL MEDICINE

## 2018-08-22 PROCEDURE — 65270000029 HC RM PRIVATE

## 2018-08-22 PROCEDURE — 74011000258 HC RX REV CODE- 258: Performed by: INTERNAL MEDICINE

## 2018-08-22 PROCEDURE — 80048 BASIC METABOLIC PNL TOTAL CA: CPT | Performed by: INTERNAL MEDICINE

## 2018-08-22 PROCEDURE — 83735 ASSAY OF MAGNESIUM: CPT | Performed by: INTERNAL MEDICINE

## 2018-08-22 RX ORDER — ENOXAPARIN SODIUM 100 MG/ML
40 INJECTION SUBCUTANEOUS EVERY 24 HOURS
Status: DISCONTINUED | OUTPATIENT
Start: 2018-08-22 | End: 2018-08-24 | Stop reason: HOSPADM

## 2018-08-22 RX ADMIN — HYDROCODONE BITARTRATE AND ACETAMINOPHEN 1 TABLET: 5; 325 TABLET ORAL at 21:08

## 2018-08-22 RX ADMIN — HYDROCODONE BITARTRATE AND ACETAMINOPHEN 1 TABLET: 5; 325 TABLET ORAL at 10:13

## 2018-08-22 RX ADMIN — HEPARIN SODIUM 5000 UNITS: 5000 INJECTION INTRAVENOUS; SUBCUTANEOUS at 06:22

## 2018-08-22 RX ADMIN — ENOXAPARIN SODIUM 40 MG: 100 INJECTION SUBCUTANEOUS at 13:26

## 2018-08-22 RX ADMIN — SODIUM CHLORIDE 3.38 G: 900 INJECTION, SOLUTION INTRAVENOUS at 09:15

## 2018-08-22 RX ADMIN — SODIUM CHLORIDE 3.38 G: 900 INJECTION, SOLUTION INTRAVENOUS at 00:45

## 2018-08-22 RX ADMIN — Medication 10 ML: at 21:08

## 2018-08-22 RX ADMIN — VANCOMYCIN HYDROCHLORIDE 1000 MG: 1 INJECTION, POWDER, LYOPHILIZED, FOR SOLUTION INTRAVENOUS at 21:08

## 2018-08-22 RX ADMIN — Medication 10 ML: at 06:22

## 2018-08-22 RX ADMIN — SODIUM CHLORIDE 3.38 G: 900 INJECTION, SOLUTION INTRAVENOUS at 16:42

## 2018-08-22 RX ADMIN — VANCOMYCIN HYDROCHLORIDE 1000 MG: 1 INJECTION, POWDER, LYOPHILIZED, FOR SOLUTION INTRAVENOUS at 04:20

## 2018-08-22 RX ADMIN — VANCOMYCIN HYDROCHLORIDE 1000 MG: 1 INJECTION, POWDER, LYOPHILIZED, FOR SOLUTION INTRAVENOUS at 13:25

## 2018-08-22 NOTE — PROGRESS NOTES
Problem: Falls - Risk of  Goal: *Absence of Falls  Document Noemy Fall Risk and appropriate interventions in the flowsheet.    Outcome: Progressing Towards Goal  Fall Risk Interventions:            Medication Interventions: Patient to call before getting OOB, Teach patient to arise slowly, Utilize gait belt for transfers/ambulation

## 2018-08-22 NOTE — PROGRESS NOTES
Bedside and Verbal shift change report given to Leonel Torres RN (oncoming nurse) by Reginald Holcomb RN (offgoing nurse). Report included the following information SBAR, Kardex, Intake/Output, MAR, Recent Results and Med Rec Status.

## 2018-08-22 NOTE — PROGRESS NOTES
VAT Note:  Voice mail left for Mable Connor  to assess if there is going to be care for PICC line  and antibiotic coverage after placement. Asked for call back at extension [phone]. Patient with working PIV.

## 2018-08-22 NOTE — ROUTINE PROCESS
Bedside shift change report given to Osito Menchaca (oncoming nurse) by Monica Joyce (offgoing nurse). Report included the following information SBAR, Kardex, MAR and Recent Results.

## 2018-08-22 NOTE — PROGRESS NOTES
CM Note:  Pt's cousin, Pia Hernandez, was called ([phone]). He speaks no [place] nor does his wife. The blue phone was used for an . His cousin stated that he works and was only able to change the dressing once a day after work. His wife also works. There is no one who is home during the day. He was informed that the pt would be on IV abx for several weeks. He said if he could give him the medication before and after work he is willing to do what he can. Pt will be on a q8h dose. The cousin stated he & his wife are doing all they can to take care of their family.   YARELIS Cordon

## 2018-08-22 NOTE — PROGRESS NOTES
[address] Dosing Services:      Consult for antibiotic dosing of vancomycin by Dr. Brooke Adams  Indication: OM     Vancomycin therapy:  Current maintenance dose: 1000 (mg) every 8 hours   Trough goal 15-20 mcg/mL. Last trough level 13.5 mcg/ml drawn slightly late. Corrected trough 15 mcg/ml.   Plan: continue current dose    Pharmacy to follow daily and will make changes to dose and/or frequency based on clinical status.   Pharmacist Nga Cruz                                  [id number]

## 2018-08-22 NOTE — PROGRESS NOTES
Dontrell Mcintosh amarjit [address]on [address]  [address]  [phone]      Medical Progress Note      NAME: Marci Flores   :  1970  MRM:  [id number]    Date/Time: 2018  10:15 AM        Subjective:     Chief Complaint:  Arm wound: stable    ROS:  (bold if positive, if negative)                        Tolerating Diet          Objective:       Vitals:          Last 24hrs VS reviewed since prior progress note.  Most recent are:    Visit Vitals    /60 (BP 1 Location: Right arm, BP Patient Position: At rest)    Pulse 72    Temp 98 °F (36.7 °C)    Resp 17    Ht 5' 4\" (1.626 m)    Wt 65.3 kg (144 lb)    SpO2 97%    BMI 24.72 kg/m2     SpO2 Readings from Last 6 Encounters:   18 97%            Intake/Output Summary (Last 24 hours) at 18 1015  Last data filed at 18 0227   Gross per 24 hour   Intake              240 ml   Output                0 ml   Net              240 ml          Exam:     Physical Exam:    Gen:  Well-developed, well-nourished, in no acute distress  HEENT:  Pink conjunctivae, PERRL, hearing intact to voice, moist mucous membranes  Neck:  Supple, without masses, thyroid non-tender  Resp:  No accessory muscle use, clear breath sounds without wheezes rales or rhonchi  Card:  No murmurs, normal S1, S2 without thrills, bruits or peripheral edema, 2+ pedal pulses  Abd:  Soft, non-tender, non-distended, normoactive bowel sounds are present, no palpable organomegaly and no detectable hernias  Lymph:  No cervical or inguinal adenopathy  Musc:  No cyanosis or clubbing  Skin: left arm wound dressed, C/D/I  Neuro:  Cranial nerves are grossly intact, no focal motor weakness, follows commands appropriately  Psych:  Good insight, oriented to person, place and time, alert       Medications Reviewed: (see below)    Lab Data Reviewed: (see below)    ______________________________________________________________________    Medications:     Current Facility-Administered Medications   Medication Dose Route Frequency    enoxaparin (LOVENOX) injection 40 mg  40 mg SubCUTAneous Q24H    vancomycin (VANCOCIN) 1,000 mg in 0.9% sodium chloride (MBP/ADV) 250 mL  1,000 mg IntraVENous Q8H    vancomycin: please obtain trough 8/22 @ 1130 (30 mins prior to noon dose)    Other ONCE    piperacillin-tazobactam (ZOSYN) 3.375 g in 0.9% sodium chloride (MBP/ADV) 100 mL ADV  3.375 g IntraVENous Q8H    sodium chloride (NS) flush 5-10 mL  5-10 mL IntraVENous Q8H    sodium chloride (NS) flush 5-10 mL  5-10 mL IntraVENous PRN    acetaminophen (TYLENOL) tablet 650 mg  650 mg Oral Q4H PRN    HYDROcodone-acetaminophen (NORCO) 5-325 mg per tablet 1 Tab  1 Tab Oral Q4H PRN    diphenhydrAMINE (BENADRYL) capsule 25 mg  25 mg Oral Q4H PRN    naloxone (NARCAN) injection 0.4 mg  0.4 mg IntraVENous PRN    ondansetron (ZOFRAN) injection 4 mg  4 mg IntraVENous Q4H PRN            Lab Review:     Recent Labs      08/22/18   0046   WBC  4.5   HGB  12.0*   HCT  36.0*   PLT  276     Recent Labs      08/22/18   0046  08/20/18   0120   NA  140   --    K  3.7   --    CL  106   --    CO2  28   --    GLU  96   --    BUN  12  8   CREA  0.73  0.63*   CA  8.6   --    MG  1.8   --    PHOS  4.1   --      No results found for: GLUCPOC  No results for input(s): PH, PCO2, PO2, HCO3, FIO2 in the last 72 hours. No results for input(s): INR in the last 72 hours.     No lab exists for component: INREXT, INREXT  No results found for: SDES  Lab Results   Component Value Date/Time    Culture result: (A) 08/16/2018 05:41 PM     LIGHT PSEUDOMONAS AERUGINOSA (TWO DIFFERENT COLONY TYPES, SAME SENSITIVITIES)    Culture result: NO GROWTH 5 DAYS 08/16/2018 02:12 PM    Culture result: HEAVY PSEUDOMONAS AERUGINOSA (A) 08/16/2018 01:40 PM            Assessment:     Principal Problem:    Osteomyelitis ([address] (8/16/2018)           Plan:     Principal Problem:    Osteomyelitis ([facility] [address] (8/16/2018)   - prolonged course of IV antibiotics per Dr. Heather Ruiz   - if this doesn't work, he will need more extensive amputation, possibly disarticulation at the shoulder   - PICC ordered   - no insurance, CM trying to arrange alternate placement for IV antibiotics   - pain controlled with oral meds      Total time spent in patient care: 15 minutes                  Care Plan discussed with: Patient, Care Manager and Nursing Staff    Discussed:  Code Status, Care Plan and D/C Planning    Prophylaxis:  Hep SQ    Disposition:  CM to arrange           ___________________________________________________    Attending Physician: Rah Yee MD

## 2018-08-23 ENCOUNTER — APPOINTMENT (OUTPATIENT)
Dept: GENERAL RADIOLOGY | Age: 48
DRG: 565 | End: 2018-08-23
Attending: INTERNAL MEDICINE
Payer: SELF-PAY

## 2018-08-23 LAB — CREAT SERPL-MCNC: 0.75 MG/DL (ref 0.7–1.3)

## 2018-08-23 PROCEDURE — 77030018719 HC DRSG PTCH ANTIMIC J&J -A

## 2018-08-23 PROCEDURE — 36415 COLL VENOUS BLD VENIPUNCTURE: CPT | Performed by: INTERNAL MEDICINE

## 2018-08-23 PROCEDURE — 77030018786 HC NDL GD F/USND BARD -B

## 2018-08-23 PROCEDURE — 74011250636 HC RX REV CODE- 250/636: Performed by: INTERNAL MEDICINE

## 2018-08-23 PROCEDURE — 76937 US GUIDE VASCULAR ACCESS: CPT

## 2018-08-23 PROCEDURE — C1751 CATH, INF, PER/CENT/MIDLINE: HCPCS

## 2018-08-23 PROCEDURE — 82565 ASSAY OF CREATININE: CPT | Performed by: INTERNAL MEDICINE

## 2018-08-23 PROCEDURE — 02HV33Z INSERTION OF INFUSION DEVICE INTO SUPERIOR VENA CAVA, PERCUTANEOUS APPROACH: ICD-10-PCS | Performed by: INTERNAL MEDICINE

## 2018-08-23 PROCEDURE — 65270000029 HC RM PRIVATE

## 2018-08-23 PROCEDURE — 74011000258 HC RX REV CODE- 258: Performed by: INTERNAL MEDICINE

## 2018-08-23 PROCEDURE — 74011250637 HC RX REV CODE- 250/637: Performed by: INTERNAL MEDICINE

## 2018-08-23 RX ORDER — SODIUM CHLORIDE 0.9 % (FLUSH) 0.9 %
20 SYRINGE (ML) INJECTION EVERY 24 HOURS
Status: DISCONTINUED | OUTPATIENT
Start: 2018-08-23 | End: 2018-08-24 | Stop reason: HOSPADM

## 2018-08-23 RX ORDER — SODIUM CHLORIDE 0.9 % (FLUSH) 0.9 %
10-30 SYRINGE (ML) INJECTION AS NEEDED
Status: DISCONTINUED | OUTPATIENT
Start: 2018-08-23 | End: 2018-08-24 | Stop reason: HOSPADM

## 2018-08-23 RX ORDER — SODIUM CHLORIDE 0.9 % (FLUSH) 0.9 %
10-40 SYRINGE (ML) INJECTION EVERY 8 HOURS
Status: DISCONTINUED | OUTPATIENT
Start: 2018-08-23 | End: 2018-08-24 | Stop reason: HOSPADM

## 2018-08-23 RX ORDER — SODIUM CHLORIDE 0.9 % (FLUSH) 0.9 %
10 SYRINGE (ML) INJECTION EVERY 24 HOURS
Status: DISCONTINUED | OUTPATIENT
Start: 2018-08-23 | End: 2018-08-24 | Stop reason: HOSPADM

## 2018-08-23 RX ORDER — SODIUM CHLORIDE 0.9 % (FLUSH) 0.9 %
10 SYRINGE (ML) INJECTION AS NEEDED
Status: DISCONTINUED | OUTPATIENT
Start: 2018-08-23 | End: 2018-08-24 | Stop reason: HOSPADM

## 2018-08-23 RX ADMIN — HYDROCODONE BITARTRATE AND ACETAMINOPHEN 1 TABLET: 5; 325 TABLET ORAL at 16:16

## 2018-08-23 RX ADMIN — Medication 10 ML: at 04:52

## 2018-08-23 RX ADMIN — HYDROCODONE BITARTRATE AND ACETAMINOPHEN 1 TABLET: 5; 325 TABLET ORAL at 20:44

## 2018-08-23 RX ADMIN — HYDROCODONE BITARTRATE AND ACETAMINOPHEN 1 TABLET: 5; 325 TABLET ORAL at 09:09

## 2018-08-23 RX ADMIN — SODIUM CHLORIDE 3.38 G: 900 INJECTION, SOLUTION INTRAVENOUS at 09:09

## 2018-08-23 RX ADMIN — Medication 10 ML: at 01:21

## 2018-08-23 RX ADMIN — ENOXAPARIN SODIUM 40 MG: 100 INJECTION SUBCUTANEOUS at 14:01

## 2018-08-23 RX ADMIN — VANCOMYCIN HYDROCHLORIDE 1000 MG: 1 INJECTION, POWDER, LYOPHILIZED, FOR SOLUTION INTRAVENOUS at 20:44

## 2018-08-23 RX ADMIN — VANCOMYCIN HYDROCHLORIDE 1000 MG: 1 INJECTION, POWDER, LYOPHILIZED, FOR SOLUTION INTRAVENOUS at 12:08

## 2018-08-23 RX ADMIN — Medication 10 ML: at 20:44

## 2018-08-23 RX ADMIN — Medication 10 ML: at 23:52

## 2018-08-23 RX ADMIN — VANCOMYCIN HYDROCHLORIDE 1000 MG: 1 INJECTION, POWDER, LYOPHILIZED, FOR SOLUTION INTRAVENOUS at 04:51

## 2018-08-23 RX ADMIN — HYDROCODONE BITARTRATE AND ACETAMINOPHEN 1 TABLET: 5; 325 TABLET ORAL at 01:21

## 2018-08-23 RX ADMIN — SODIUM CHLORIDE 3.38 G: 900 INJECTION, SOLUTION INTRAVENOUS at 01:21

## 2018-08-23 RX ADMIN — SODIUM CHLORIDE 3.38 G: 900 INJECTION, SOLUTION INTRAVENOUS at 16:16

## 2018-08-23 NOTE — PROGRESS NOTES
NUTRITION   RD Screen      Pt seen for:      []  MST for     []   MD Consult    []        Supplements  []   PO intake check   []        Food Allergies  []   Food Preferences/tolerances    []        Rescreen  []   Education    []        Diet order clarification []   Other   [x]  LOS                          Nutrition Prescription:     Continue regular diet. Added Ensure HP for added protein once per day. Encourage adequate intake of meals and supplements. Monitor BM status- currently 5 days wtihout BM. No bowel regimen in place at this time. Assessment:   Information obtained from: Chart/patient      Cultural, Mandaen and ethnic food preferences:   [x]  None   []  Identified and addressed    Diet:  Regular    PO Intake:   [x] Good           [] Fair         []  Poor     Patient Vitals for the past 100 hrs:   % Diet Eaten   08/19/18 1255 100 %       Wt Readings from Last 5 Encounters:   08/23/18 64.9 kg (143 lb)   08/20/18 65.3 kg (143 lb 15.4 oz)   08/16/18 65.3 kg (144 lb)   07/27/18 63.5 kg (140 lb)       Body mass index is 24.55 kg/(m^2). Skin: left arm partial thickness- osteomyelitis. Left leg   BM: 8/18 - constipation  ABD: WNL- active BS    Estimated Daily Nutrition Requirements:  Kcals/day: 1846 Kcals/day (BMR(1420x1.3))  Protein: 65 g (-78g/day(1.0-1.2g/kg))  Fluid:  1850 mL     Based On:  Jefferson Rick  Weight Used:   64.9 kg  Weight Changes:   []   Loss  []   Gain  [x]   Stable    Nutrition Problems Identified  [x]     None  []     Specified food preferences   []     Dislikes supplements              []     Allergies  []     Difficulty chewing     []     Dentition   []     Nausea/Vomiting  []    Constipation  []    Diarrhea    Nutrition Diagnosis:      Problem:  Increased nutrient needs     Etiology: related to increased protein needs     Signs/Symptoms: as evidenced by wound healing       Intervention:   []    Obtained/adjusted food preferences/tolerances and/or snacks options   []    Dislikes supplements will try a substitution   []    Modify diet for food allergies  []    Adjust texture due to difficulty chewing   []    Encourage Fresh vegetables, whole grains, general healthful diet   []    Educated patient  []    Rescreen per screening protocol  [x]    Add Supplements    Goal: Pt will consume >50% of meals and ONS wihtin 5-7 days    Monitoring/Evaluation:   Education & Discharge Needs  [] Nutrition related discharge needs addressed:   [x] Supplements (on d/c instruction &/or coupons provided)    [] Education   [] No nutrition related discharge needs at this time    Rescreen: [x]  At Nutrition Risk          []  Not at Nutrition Risk, rescreen per screening protocol    Clair Aggarwal, [address]   Pager: [phone]

## 2018-08-23 NOTE — PROGRESS NOTES
PICC Placement Note    PRE-PROCEDURE VERIFICATION  Correct Procedure: yes  Correct Site:  yes  Temperature: Temp: 98.1 °F (36.7 °C), Temperature Source: Temp Source: Oral  Recent Labs      08/23/18   0309  08/22/18   0046   BUN   --   12   CREA  0.75  0.73   PLT   --   276   WBC   --   4.5     Allergies: Review of patient's allergies indicates no known allergies. Education materials for PICC Care given: yes. See Patient Education activity for further details. PICC Booklet placed at bedside: yes    Closed Ended PICC Catheters:  Flush Lumens as Follows:  Intermittent Medication:   Flush before and after each medication with 10 ml NS. Unused Ports:  Flush every 8 hours with 10 ml NS.  TPN Ports:  Flush every 24 hours with 20 ml NS prior to hanging new bag. Blood Draws: Stop infusion, draw off and waste 10 ml of blood. Draw sample with 10cc syringe or greater. DO NOT USE VACUTAINER . Transfer with appropriate device to lab  tubes. Flush with 20 ml NS. Dressing Change:  Every 7 days, and PRN using sterile technique if integrity of dressing is compromised. Initial dressing change for central line 24-48 hours post insertion if gauze is used. Apply new dressing per policy. PROCEDURE DETAIL  Consent was obtained and all questions were answered related to risks and benefits. A double lumen PICC line was inserted, as a sterile procedure using ultrasound and modified Seldinger technique for antibiotic therapy. The following documentation is in addition to the PICC properties in the lines/airways flowsheet :  Lot #: [id number]  Lidocaine 1% administered intradermally :yes  Internal Catheter Total Length: 40 cm total with 38 cm internal length and 2 cm out  Vein Selection for PICC:right basilic  Central Line Bundle followed yes  Complication Related to Insertion:no    The placement was verified by EKG, MAX P WAVE @ 38 (cm). PER EKG PICC TIP @ C/A junction.         Line is okay to use: yes    Agustina Brown RN

## 2018-08-23 NOTE — PROGRESS NOTES
Dontrell Mcintosh [facility] [address]  [address]  [phone]      Medical Progress Note      NAME: Jennifer Rodriguez   :  1970  MRM:  [id number]    Date/Time: 2018  10:15 AM        Subjective:     Chief Complaint:  Arm wound: stable    ROS:  (bold if positive, if negative)                        Tolerating Diet          Objective:       Vitals:          Last 24hrs VS reviewed since prior progress note.  Most recent are:    Visit Vitals    /62 (BP 1 Location: Right arm, BP Patient Position: Sitting)    Pulse 76    Temp 98.6 °F (37 °C)    Resp 20    Ht 5' 4\" (1.626 m)    Wt 64.9 kg (143 lb)    SpO2 97%    BMI 24.55 kg/m2     SpO2 Readings from Last 6 Encounters:   18 97%            Intake/Output Summary (Last 24 hours) at 18 1048  Last data filed at 18 0457   Gross per 24 hour   Intake              360 ml   Output                0 ml   Net              360 ml          Exam:     Physical Exam:    Gen:  Well-developed, well-nourished, in no acute distress  HEENT:  Pink conjunctivae, PERRL, hearing intact to voice, moist mucous membranes  Neck:  Supple, without masses, thyroid non-tender  Resp:  No accessory muscle use, clear breath sounds without wheezes rales or rhonchi  Card:  No murmurs, normal S1, S2 without thrills, bruits or peripheral edema, 2+ pedal pulses  Abd:  Soft, non-tender, non-distended, normoactive bowel sounds are present, no palpable organomegaly and no detectable hernias  Lymph:  No cervical or inguinal adenopathy  Musc:  No cyanosis or clubbing  Skin: left arm wound dressed, C/D/I  Neuro:  Cranial nerves are grossly intact, no focal motor weakness, follows commands appropriately  Psych:  Good insight, oriented to person, place and time, alert       Medications Reviewed: (see below)    Lab Data Reviewed: (see below)    ______________________________________________________________________    Medications:     Current Facility-Administered Medications   Medication Dose Route Frequency    enoxaparin (LOVENOX) injection 40 mg  40 mg SubCUTAneous Q24H    vancomycin (VANCOCIN) 1,000 mg in 0.9% sodium chloride (MBP/ADV) 250 mL  1,000 mg IntraVENous Q8H    piperacillin-tazobactam (ZOSYN) 3.375 g in 0.9% sodium chloride (MBP/ADV) 100 mL ADV  3.375 g IntraVENous Q8H    sodium chloride (NS) flush 5-10 mL  5-10 mL IntraVENous Q8H    sodium chloride (NS) flush 5-10 mL  5-10 mL IntraVENous PRN    acetaminophen (TYLENOL) tablet 650 mg  650 mg Oral Q4H PRN    HYDROcodone-acetaminophen (NORCO) 5-325 mg per tablet 1 Tab  1 Tab Oral Q4H PRN    diphenhydrAMINE (BENADRYL) capsule 25 mg  25 mg Oral Q4H PRN    naloxone (NARCAN) injection 0.4 mg  0.4 mg IntraVENous PRN    ondansetron (ZOFRAN) injection 4 mg  4 mg IntraVENous Q4H PRN            Lab Review:     Recent Labs      08/22/18   0046   WBC  4.5   HGB  12.0*   HCT  36.0*   PLT  276     Recent Labs      08/23/18   0309  08/22/18   0046   NA   --   140   K   --   3.7   CL   --   106   CO2   --   28   GLU   --   96   BUN   --   12   CREA  0.75  0.73   CA   --   8.6   MG   --   1.8   PHOS   --   4.1     No results found for: GLUCPOC  No results for input(s): PH, PCO2, PO2, HCO3, FIO2 in the last 72 hours. No results for input(s): INR in the last 72 hours.     No lab exists for component: INREXT, INREXT  No results found for: SDES  Lab Results   Component Value Date/Time    Culture result: (A) 08/16/2018 05:41 PM     LIGHT PSEUDOMONAS AERUGINOSA (TWO DIFFERENT COLONY TYPES, SAME SENSITIVITIES)    Culture result: NO GROWTH 6 DAYS 08/16/2018 02:12 PM    Culture result: HEAVY PSEUDOMONAS AERUGINOSA (A) 08/16/2018 01:40 PM            Assessment:     Principal Problem:    Osteomyelitis ([facility] [address] (8/16/2018)           Plan:     Principal Problem:    Osteomyelitis ([facility] [address] (8/16/2018)   - prolonged course of IV antibiotics per Dr. Hamzah Shane   - if this doesn't work, he will need more extensive amputation, possibly disarticulation at the shoulder   - PICC ordered   - no insurance, CM trying to arrange alternate placement for IV antibiotics   - pain controlled with oral meds      Total time spent in patient care: 15 minutes                  Care Plan discussed with: Patient, Care Manager and Nursing Staff    Discussed:  Code Status, Care Plan and D/C Planning    Prophylaxis:  Hep SQ    Disposition:  CM to arrange           ___________________________________________________    Attending Physician: Juan Patel MD

## 2018-08-23 NOTE — PROGRESS NOTES
VAT Note:  PICC order for LT Antbx. Need for blue phone for consent, patient Turkmen speaking only. Case management working on discharge.

## 2018-08-23 NOTE — TELEPHONE ENCOUNTER
I called back to Lila Kennedy at [facility] and left her message reading Dr. Luna Cook response. I am also letting Nurse navigator's know of pt's hospital admission which should show up on their report upon hospital discharge.  Siena Hernández RN

## 2018-08-23 NOTE — WOUND CARE
Wound care follow up  Plan of care and wound care orders discussed with patient with Cristian Estrada (primary nurse)- fluent in [address] [facility] was . Plan to decrease wound care frequency to every other day with plans to assess and change on 8/25 in preparation for discharge, patient verbalized good understanding. Will follow up on 8/25.     Joseline Carrizales

## 2018-08-23 NOTE — PROGRESS NOTES
Bedside and Verbal shift change report given to Domingo Mercado RN (oncoming nurse) by Bonny Fountain RN (offgoing nurse). Report included the following information SBAR, Kardex, Intake/Output, MAR, Recent Results and Med Rec Status.

## 2018-08-23 NOTE — PROGRESS NOTES
Bedside and Verbal shift change report given to Lidia Ahuja RN (oncoming nurse) by Raman Ceja RN (offgoing nurse). Report included the following information SBAR and Kardex.

## 2018-08-23 NOTE — PROGRESS NOTES
Rounded on patient. Patient stated that he's worried about his family since he's their only source of income and he hasn't been able to work in over a month. He has shira that he will be able to work in the near future. Patient also stated that he can't read or write.       [facility]mair Adams [facility]

## 2018-08-23 NOTE — PROGRESS NOTES
RENEE Note:   met with the pt. He is not eligible for Medicaid. He does not read or write. He will need help to apply for the Care Card.   YARELIS Cordon

## 2018-08-24 ENCOUNTER — HOSPITAL ENCOUNTER (OUTPATIENT)
Age: 48
Discharge: HOME | End: 2018-10-02
Attending: HOSPITALIST | Admitting: HOSPITALIST
Payer: SELF-PAY

## 2018-08-24 ENCOUNTER — APPOINTMENT (OUTPATIENT)
Dept: GENERAL RADIOLOGY | Age: 48
End: 2018-08-24
Attending: HOSPITALIST
Payer: SELF-PAY

## 2018-08-24 VITALS
OXYGEN SATURATION: 96 % | SYSTOLIC BLOOD PRESSURE: 128 MMHG | RESPIRATION RATE: 16 BRPM | TEMPERATURE: 98.4 F | DIASTOLIC BLOOD PRESSURE: 67 MMHG | HEART RATE: 69 BPM | BODY MASS INDEX: 24.41 KG/M2 | WEIGHT: 143 LBS | HEIGHT: 64 IN

## 2018-08-24 LAB — CREAT SERPL-MCNC: 0.67 MG/DL (ref 0.7–1.3)

## 2018-08-24 PROCEDURE — 74011250637 HC RX REV CODE- 250/637: Performed by: INTERNAL MEDICINE

## 2018-08-24 PROCEDURE — 74011250637 HC RX REV CODE- 250/637: Performed by: HOSPITALIST

## 2018-08-24 PROCEDURE — 74011250636 HC RX REV CODE- 250/636: Performed by: INTERNAL MEDICINE

## 2018-08-24 PROCEDURE — 74011000258 HC RX REV CODE- 258: Performed by: HOSPITALIST

## 2018-08-24 PROCEDURE — 71045 X-RAY EXAM CHEST 1 VIEW: CPT

## 2018-08-24 PROCEDURE — 36415 COLL VENOUS BLD VENIPUNCTURE: CPT | Performed by: INTERNAL MEDICINE

## 2018-08-24 PROCEDURE — 74011250637 HC RX REV CODE- 250/637: Performed by: EMERGENCY MEDICINE

## 2018-08-24 PROCEDURE — 82565 ASSAY OF CREATININE: CPT | Performed by: INTERNAL MEDICINE

## 2018-08-24 PROCEDURE — 74011250636 HC RX REV CODE- 250/636: Performed by: HOSPITALIST

## 2018-08-24 PROCEDURE — 77030011254 HC DRSG HYDRGEL S&N -A

## 2018-08-24 PROCEDURE — 74011000258 HC RX REV CODE- 258: Performed by: INTERNAL MEDICINE

## 2018-08-24 RX ORDER — ENOXAPARIN SODIUM 100 MG/ML
40 INJECTION SUBCUTANEOUS EVERY 24 HOURS
Status: DISCONTINUED | OUTPATIENT
Start: 2018-08-25 | End: 2018-10-02 | Stop reason: HOSPADM

## 2018-08-24 RX ORDER — ACETAMINOPHEN 325 MG/1
650 TABLET ORAL
Status: DISCONTINUED | OUTPATIENT
Start: 2018-08-24 | End: 2018-09-07

## 2018-08-24 RX ORDER — HYDROCODONE BITARTRATE AND ACETAMINOPHEN 5; 325 MG/1; MG/1
1 TABLET ORAL
Status: DISCONTINUED | OUTPATIENT
Start: 2018-08-25 | End: 2018-09-28

## 2018-08-24 RX ORDER — SODIUM CHLORIDE 0.9 % (FLUSH) 0.9 %
5-10 SYRINGE (ML) INJECTION EVERY 8 HOURS
Status: DISCONTINUED | OUTPATIENT
Start: 2018-08-24 | End: 2018-10-02 | Stop reason: HOSPADM

## 2018-08-24 RX ORDER — ONDANSETRON 2 MG/ML
4 INJECTION INTRAMUSCULAR; INTRAVENOUS
Status: DISCONTINUED | OUTPATIENT
Start: 2018-08-24 | End: 2018-10-02 | Stop reason: HOSPADM

## 2018-08-24 RX ORDER — SODIUM CHLORIDE 0.9 % (FLUSH) 0.9 %
5-10 SYRINGE (ML) INJECTION AS NEEDED
Status: DISCONTINUED | OUTPATIENT
Start: 2018-08-24 | End: 2018-10-02 | Stop reason: HOSPADM

## 2018-08-24 RX ORDER — HYDROCODONE BITARTRATE AND ACETAMINOPHEN 5; 325 MG/1; MG/1
1 TABLET ORAL
Status: DISCONTINUED | OUTPATIENT
Start: 2018-08-24 | End: 2018-08-24

## 2018-08-24 RX ORDER — HYDROCODONE BITARTRATE AND ACETAMINOPHEN 5; 325 MG/1; MG/1
1 TABLET ORAL
Status: SHIPPED | COMMUNITY
Start: 2018-08-24 | End: 2018-10-02

## 2018-08-24 RX ADMIN — HYDROCODONE BITARTRATE AND ACETAMINOPHEN 1 TABLET: 5; 325 TABLET ORAL at 00:35

## 2018-08-24 RX ADMIN — HYDROCODONE BITARTRATE AND ACETAMINOPHEN 1 TABLET: 5; 325 TABLET ORAL at 23:28

## 2018-08-24 RX ADMIN — HYDROCODONE BITARTRATE AND ACETAMINOPHEN 1 TABLET: 5; 325 TABLET ORAL at 13:22

## 2018-08-24 RX ADMIN — SODIUM CHLORIDE 3.38 G: 900 INJECTION, SOLUTION INTRAVENOUS at 00:35

## 2018-08-24 RX ADMIN — Medication 10 ML: at 03:33

## 2018-08-24 RX ADMIN — HYDROCODONE BITARTRATE AND ACETAMINOPHEN 1 TABLET: 5; 325 TABLET ORAL at 19:07

## 2018-08-24 RX ADMIN — Medication 10 ML: at 19:10

## 2018-08-24 RX ADMIN — SODIUM CHLORIDE 3.38 G: 900 INJECTION, SOLUTION INTRAVENOUS at 09:17

## 2018-08-24 RX ADMIN — VANCOMYCIN HYDROCHLORIDE 1000 MG: 1 INJECTION, POWDER, LYOPHILIZED, FOR SOLUTION INTRAVENOUS at 12:56

## 2018-08-24 RX ADMIN — Medication 10 ML: at 19:09

## 2018-08-24 RX ADMIN — PIPERACILLIN SODIUM AND TAZOBACTAM SODIUM 3.38 G: 3; .375 INJECTION, POWDER, LYOPHILIZED, FOR SOLUTION INTRAVENOUS at 19:12

## 2018-08-24 RX ADMIN — VANCOMYCIN HYDROCHLORIDE 1000 MG: 1 INJECTION, POWDER, LYOPHILIZED, FOR SOLUTION INTRAVENOUS at 22:55

## 2018-08-24 RX ADMIN — HYDROCODONE BITARTRATE AND ACETAMINOPHEN 1 TABLET: 5; 325 TABLET ORAL at 09:31

## 2018-08-24 RX ADMIN — Medication 10 ML: at 22:52

## 2018-08-24 RX ADMIN — ENOXAPARIN SODIUM 40 MG: 100 INJECTION SUBCUTANEOUS at 13:22

## 2018-08-24 RX ADMIN — VANCOMYCIN HYDROCHLORIDE 1000 MG: 1 INJECTION, POWDER, LYOPHILIZED, FOR SOLUTION INTRAVENOUS at 03:32

## 2018-08-24 NOTE — PROGRESS NOTES
1154:   time by [facility] is 2:30 pm today. YARELIS Cordon    CM Note:  From [facility] - MILLER GOLD/Eber Reyes:  \"Dr. Isabelle Small has accepted the patient to come today- She wants patient to arrive by 3:30PM. If there is a problem with transportation let me know  [phone]  Please have nursing call report to [phone]-  room  [phone]   Wound Care and supplies if special products are used. EMTLA Continue Medical Condition  Clear documentation about patient agreement to transfer and what to expect. Contact information for family   CM report to me. \"  Nursing notified. A referral was sent in [place] to [facility] requesting a  no later than 2:30 today.   YARELIS Cordon

## 2018-08-24 NOTE — PROGRESS NOTES
Dontrell Mcintosh [facility]  [address] [facility], [address] [place] [address]  [phone]      Medical Progress Note      NAME: Jerman Alvarez   :  1970  MRM:  [id number]    Date/Time: 2018  10:09 AM         Subjective:     Chief Complaint:  Arm wound: stable    ROS:  (bold if positive, if negative)                        Tolerating Diet          Objective:       Vitals:          Last 24hrs VS reviewed since prior progress note.  Most recent are:    Visit Vitals    /69 (BP 1 Location: Right leg, BP Patient Position: At rest;Supine)    Pulse 67    Temp 98.2 °F (36.8 °C)    Resp 16    Ht 5' 4\" (1.626 m)    Wt 64.9 kg (143 lb)    SpO2 97%    BMI 24.55 kg/m2     SpO2 Readings from Last 6 Encounters:   18 97%            Intake/Output Summary (Last 24 hours) at 18 1009  Last data filed at 18 0017   Gross per 24 hour   Intake              480 ml   Output                0 ml   Net              480 ml          Exam:     Physical Exam:    Gen:  Well-developed, well-nourished, in no acute distress  HEENT:  Pink conjunctivae, PERRL, hearing intact to voice, moist mucous membranes  Neck:  Supple, without masses, thyroid non-tender  Resp:  No accessory muscle use, clear breath sounds without wheezes rales or rhonchi  Card:  No murmurs, normal S1, S2 without thrills, bruits or peripheral edema, 2+ pedal pulses  Abd:  Soft, non-tender, non-distended, normoactive bowel sounds are present, no palpable organomegaly and no detectable hernias  Lymph:  No cervical or inguinal adenopathy  Musc:  No cyanosis or clubbing  Skin: left arm wound dressed, C/D/I  Neuro:  Cranial nerves are grossly intact, no focal motor weakness, follows commands appropriately  Psych:  Good insight, oriented to person, place and time, alert       Medications Reviewed: (see below)    Lab Data Reviewed: (see below)    ______________________________________________________________________    Medications: Current Facility-Administered Medications   Medication Dose Route Frequency    sodium chloride (NS) flush 10-30 mL  10-30 mL InterCATHeter PRN    sodium chloride (NS) flush 10 mL  10 mL InterCATHeter Q24H    sodium chloride (NS) flush 10 mL  10 mL InterCATHeter PRN    sodium chloride (NS) flush 10-40 mL  10-40 mL InterCATHeter Q8H    sodium chloride (NS) flush 20 mL  20 mL InterCATHeter Q24H    alteplase (CATHFLO) 1 mg in sterile water (preservative free) 1 mL injection  1 mg InterCATHeter PRN    enoxaparin (LOVENOX) injection 40 mg  40 mg SubCUTAneous Q24H    vancomycin (VANCOCIN) 1,000 mg in 0.9% sodium chloride (MBP/ADV) 250 mL  1,000 mg IntraVENous Q8H    piperacillin-tazobactam (ZOSYN) 3.375 g in 0.9% sodium chloride (MBP/ADV) 100 mL ADV  3.375 g IntraVENous Q8H    sodium chloride (NS) flush 5-10 mL  5-10 mL IntraVENous Q8H    sodium chloride (NS) flush 5-10 mL  5-10 mL IntraVENous PRN    acetaminophen (TYLENOL) tablet 650 mg  650 mg Oral Q4H PRN    HYDROcodone-acetaminophen (NORCO) 5-325 mg per tablet 1 Tab  1 Tab Oral Q4H PRN    diphenhydrAMINE (BENADRYL) capsule 25 mg  25 mg Oral Q4H PRN    naloxone (NARCAN) injection 0.4 mg  0.4 mg IntraVENous PRN    ondansetron (ZOFRAN) injection 4 mg  4 mg IntraVENous Q4H PRN            Lab Review:     Recent Labs      08/22/18   0046   WBC  4.5   HGB  12.0*   HCT  36.0*   PLT  276     Recent Labs      08/24/18   0338  08/23/18   0309  08/22/18   0046   NA   --    --   140   K   --    --   3.7   CL   --    --   106   CO2   --    --   28   GLU   --    --   96   BUN   --    --   12   CREA  0.67*  0.75  0.73   CA   --    --   8.6   MG   --    --   1.8   PHOS   --    --   4.1     No results found for: GLUCPOC  No results for input(s): PH, PCO2, PO2, HCO3, FIO2 in the last 72 hours. No results for input(s): INR in the last 72 hours.     No lab exists for component: INREXT, INREXT  No results found for: SDES  Lab Results   Component Value Date/Time Culture result: (A) 08/16/2018 05:41 PM     LIGHT PSEUDOMONAS AERUGINOSA (TWO DIFFERENT COLONY TYPES, SAME SENSITIVITIES)    Culture result: NO GROWTH 6 DAYS 08/16/2018 02:12 PM    Culture result: HEAVY PSEUDOMONAS AERUGINOSA (A) 08/16/2018 01:40 PM            Assessment:     Principal Problem:    Osteomyelitis ([address] (8/16/2018)           Plan:     Principal Problem:    Osteomyelitis ([address] (8/16/2018)   - long term IV antibiotics per Dr. Khushboo Escalante orders   - no insurance, CM trying to arrange alternate placement for IV antibiotics   - pain controlled with oral meds      Total time spent in patient care: 15 minutes                  Care Plan discussed with: Patient, Care Manager and Nursing Staff    Discussed:  Code Status, Care Plan and D/C Planning    Prophylaxis:  Hep SQ    Disposition:  CM to arrange           ___________________________________________________    Attending Physician: Soila Bragg MD

## 2018-08-24 NOTE — PROGRESS NOTES
1510) Pt arrive to unit  1708) Assistance by Eduardo Estrada,  with admission database. Pt asked if we could provide assistance getting a prosthetic for arm.    1907)  [id number] assistance with medication administration  1930) Bedside shift change report given to YARELIS Ware (oncoming nurse) by Tyler Andrade RN (offgoing nurse). Report included the following information SBAR, Kardex, MAR, Accordion and Recent Results.

## 2018-08-24 NOTE — IP AVS SNAPSHOT
Giorgio Montanez 
 
 
 [address] [place] Patient: Jyoti Route MRN: [id number] :1970 About your hospitalization You were admitted on:  2018 You last received care in the:  [address] You were discharged on:  2018 Why you were hospitalized Your primary diagnosis was:  Osteomyelitis (Hcc) Follow-up Information Follow up With Details Comments Contact Info Yany Hodges MD   [address] [place] [address] [phone] [address] On 10/10/2018  Your appointment time is 2:00pm For your prosthetic Arms. Please follow-up. [address]   [address] [address] [phone] [facility] On 10/3/2018 Your appointment time is 11:15 am. Please don't be late. Care A Carmenza Nunez clinic at [facility]. [address], [address]125 [facility],2Nd & 3Rd Floor Anatoly Tse MD   [address] 
[phone] Your Scheduled Appointments 2018 11:30 AM EDT TRANSITIONAL CARE MANAGEMENT with Yany Hodges MD  
1301 [facility] ([address]) [place] [address] [address] 350 [facility]  
[phone] Discharge Orders None A check kartik indicates which time of day the medication should be taken. My Medications START taking these medications Instructions Each Dose to Equal  
 Morning Noon Evening Bedtime  
 lidocaine 4 % topical cream  
Commonly known as:  XYLOCAINE Your last dose was: Your next dose is:    
   
   
 Apply  to affected area four (4) times daily. Apply to affected area STOP taking these medications HYDROcodone-acetaminophen 5-325 mg per tablet Commonly known as:  NORCO  
   
  
 piperacillin-tazobactam 3.375 gram IVPB vancomycin 1,000 mg, ADDaptor 1 Device IVPB Where to Get Your Medications These medications were sent to 1842 [facility] 149, 1310 [facility] [address] [facility]  [address] [facility]  [address] [place], 112 [facility] [address] Phone:  [phone]  
  lidocaine 4 % topical cream  
  
  
  
  
Discharge Instructions None NMotive Researchhar Announcement We are excited to announce that we are making your provider's discharge notes available to you in ams AG. You will see these notes when they are completed and signed by the physician that discharged you from your recent hospital stay. If you have any questions or concerns about any information you see in ams AG, please call the Health Information Department where you were seen or reach out to your Primary Care Provider for more information about your plan of care. Introducing [facility] SERVICES! [facility] introduce portal paciente NMotive ResearchConnecticut HospiceEden Rock Communications . Ahora se puede acceder a partes de montero expediente médico, enviar por correo electrónico la oficina de montero médico y solicitar renovaciones de medicamentos en línea. En montero navegador de Internet , Celina Jiang a [email or web address] Therapeutics/Apicahart Nevaeh clic en el usuario por Ernesto Crawford? Miriam April clic aquí en la sesión Reji Mares. Verá la página de registro [place]. Ingrese montero código de Bank of July amy y manuel aparece a continuación. Usted no tendrá que [employer]ident código después de kelly completado el proceso de registro . Si usted no se inscribe antes de la fecha de caducidad , debe solicitar un nuevo código. · NMotive ResearchNorth Reading Código de acceso : [id number] Expires: 10/25/2018 11:20 AM 
 
Ingresa los últimos cuatro dígitos de montero Número de Seguro Social ( xxxx ) y fecha de nacimiento ( dd / mm / aaaa ) manuel se indica y nevaeh clic en Enviar. Usted será llevado a la siguiente página de registro . Crear un ID MyChart .  Esta será montero ID de inicio de sesión de MyChart y no puede ser [place] , por lo que pensar en saleem que es mota y fácil de recordar . Crear saleem contraseña MyChart . Usted puede cambiar montero contraseña en cualquier momento . Ingrese montero Password Reset de preguntas y Song . [place] se puede utilizar en un momento posterior si usted olvida montero contraseña. Introduzca montero dirección de correo electrónico . Priscila Guy recibirá saleem notificación por correo electrónico cuando la nueva información está disponible en MyChart . Jin Fare clic en Registrarse. Elray Manus jocelin y descargar porciones de montero expediente médico. 
Natalie clic en el enlace de descarga del menú Resumen para descargar saleem copia portátil de montero información médica . Si tiene Therese Marre[employer] , por favor visite la sección de preguntas frecuentes del sitio web MyChart . Recuerde, MyChart NO es que se utilizará para las necesidades urgentes. Para emergencias médicas , llame al 911 . Ahora disponible en montero iPhone y Android ! Introducing Prabhjot Geiger As a [place] patient, I wanted to make you aware of our electronic visit tool called Prabhjot Tundenazarioroberto. [place] [facility] 24/7 allows you to connect within minutes with a medical provider 24 hours a day, seven days a week via a mobile device or tablet or logging into a secure website from your computer. You can access Prabhjot Geiger from anywhere in the [place]. A virtual visit might be right for you when you have a simple condition and feel like you just dont want to get out of bed, or cant get away from work for an appointment, when your regular [place] provider is not available (evenings, weekends or holidays), or when youre out of town and need minor care. Electronic visits cost only $49 and if the [place] 24/7 provider determines a prescription is needed to treat your condition, one can be electronically transmitted to a nearby pharmacy*. Please take a moment to enroll today if you have not already done so.   The enrollment process is free and takes just a few minutes. To enroll, please download the [facility] 24/7 reyes to your tablet or phone, or visit [email or web address] to enroll on your computer. And, as an [address] [place] [address] patient with a Jet account, the results of your visits will be scanned into your electronic medical record and your primary care provider will be able to view the scanned results. We urge you to continue to see your regular [facility] provider for your ongoing medical care. And while your primary care provider may not be the one available when you seek a [employer] virtual visit, the peace of mind you get from getting a real diagnosis real time can be priceless. For more information on [employer] view our Frequently Asked Questions (FAQs) at [email or web address]. Sincerely, 
 
Abhijit Skinner MD 
Chief Medical Officer [facility] Nevin Musa *:  certain medications cannot be prescribed via [employer] Providers Seen During Your Hospitalization Provider Specialty Primary office phone Rhea Shepherd MD Internal Medicine [phone] Leta Chin MD Internal Medicine [phone] Caio Kaur MD Internal Medicine [phone] Lucien De Souza MD Hospitalist [phone] Immunizations Administered for This Admission Name Date Influenza Vaccine (Quad) PF 9/25/2018 Your Primary Care Physician (PCP) Primary Care Physician Office Phone Office Fax Rogelio Bowman [phone] You are allergic to the following No active allergies Recent Documentation Height Weight BMI Smoking Status 1.626 m 64.5 kg 24.41 kg/m2 Never Smoker Emergency Contacts Name Discharge Info Relation Home Work Mobile [address] CAREGIVER [3] Other Relative [6] [phone] Patient Belongings The following personal items are in your possession at time of discharge: 
     Visual Aid: None             Clothing: Footwear, Pants, Shirt    Other Valuables: Cell Phone Discharge Instructions Attachments/References OSTEOMYELITIS (Micronesian) Patient Handouts Osteomyelitis: Care Instructions Your Care Instructions Osteomyelitis (say \"ng-znlx-zd-ni-ce-MY-tus\") is a bone infection. It is caused by bacteria. The bacteria can infect the bone where it has been injured, or they can be carried through the blood from another area in the body. Osteomyelitis can be a short- or long-term problem. It is treated with antibiotics. You may get the antibiotics as pills or through a needle in a vein (IV). You will probably get treatment in the hospital at first. The type of treatment depends on the type of bacteria causing the infection, the bones affected, and how bad the infection is. Sometimes people need surgery to drain pus from bone or to fix damaged bone. Short-term osteomyelitis that is treated right away usually can be cured. But the long-term form sometimes comes back after treatment. You can help your chances of stopping the infection by taking your medicines as directed. Follow-up care is a key part of your treatment and safety. Be sure to make and go to all appointments, and call your doctor if you are having problems. It's also a good idea to know your test results and keep a list of the medicines you take. How can you care for yourself at home? · Take your antibiotics as directed. Do not stop taking them just because you feel better. You need to take the full course of antibiotics. · Take pain medicines exactly as directed. ¨ If the doctor gave you a prescription medicine for pain, take it as prescribed. ¨ If you are not taking a prescription pain medicine, ask your doctor if you can take an over-the-counter medicine. · Do mild exercise and stretching if your doctor says it is okay. This can help keep your bones and muscles healthy. Avoid strenuous work or exercise until your doctor says you can do it. · Consider physical therapy if your doctor suggests it. Physical therapy may help you have a normal range of movement. · Do not smoke. Smoking can slow healing of the infection. If you need help quitting, talk to your doctor about stop-smoking programs and medicines. These can increase your chances of quitting for good. When should you call for help? Call 911 anytime you think you may need emergency care. For example, call if: 
  · You have severe bone pain.  
 Call your doctor now or seek immediate medical care if: 
  · You continue to have bone pain.  
  · You have signs of infection, such as: 
¨ Increased pain, swelling, warmth, or redness. ¨ Red streaks leading from a wound. ¨ Pus draining from a wound. ¨ A fever.  
 Watch closely for changes in your health, and be sure to contact your doctor if: 
  · You do not get better as expected. Where can you learn more? Go to http://mya-narinder.info/. Enter A971 in the search box to learn more about \"Osteomyelitis: Care Instructions. \" Current as of: November 21, 2017 Content Version: 11.7 © 5187-7144 ImageTag, Incorporated. Care instructions adapted under license by Village [employer] (which disclaims liability or warranty for this information). If you have questions about a medical condition or this instruction, always ask your healthcare professional. [place] [address] any warranty or liability for your use of this information. Please provide this summary of care documentation to your next provider. Signatures-by signing, you are acknowledging that this After Visit Summary has been reviewed with you and you have received a copy.   
  
 
  
    
    
 Patient Signature: ____________________________________________________________ Date:  ____________________________________________________________  
  
Kaylee Midget Provider Signature:  ____________________________________________________________ Date:  ____________________________________________________________  
  
  
   
  
[address] 
 
 
 [address] [place] Patient: Johanna Rodriges MRN: [id number] :1970 Sobre [place] hospitalización Le admitieron el:  2018 [place] tratamiento más reciente fue el:  [address] Le dieron de vic el:  2018 Por qué le ingresaron [place] diagnosis primaria es:  Osteomyelitis (Hcc) Follow-up Information Follow up With Details Comments Contact Noah Mercado MD   [address] [facility] [address] 
[phone] [address] On 10/10/2018  Your appointment time is 2:00pm For your prosthetic Arms. Please follow-up. [address]   [address] [address] 
[phone] [facility] On 10/3/2018 Your appointment time is 11:15 am. Please don't be late. Care A Keisha Fell clinic at [facility]. [address], [address] [facility] 3Rd [facility] Torrey Thompson MD   [address] 
[phone] Your Scheduled Appointments 2018 11:30 AM EDT TRANSITIONAL CARE MANAGEMENT with Bonnie Mercado MD  
1301 [facility] ([address]) [place] [address] [address] 350 [facility]  
[phone] Discharge Orders BeiBei A check kartik indicates which time of day the medication should be taken. My Medications EMPIECE a Ticket ABC Instructions Each Dose to Equal  
 Morning Noon Evening Bedtime  
 lidocaine 4 % topical cream  
Michael wiseman manuel:  XYLOCAINE Your last dose was: Your next dose is:    
   
   
 Apply  to affected area four (4) times daily. Apply to affected area DEGUTIERREZ stacey ely [employer] HYDROcodone-acetaminophen 5-325 mg per tablet También conocido manuel:  NORCO  
   
  
 piperacillin-tazobactam 3.375 gram IVPB  
   
  
 vancomycin 1,000 mg, ADDaptor 1 Device IVPB Dónde puede recoger isha medicamentos Estos medicamentos fueron enviados a [facility] [address], 1310 [facility] [address] [place]  [address] [facility] Jillian Jaquez [address] [place], [address] [place] [address] Teléfono:  [phone]  
  lidocaine 4 % topical cream  
  
  
  
  
Instrucciones a xin bullock [employer] Announcement We are excited to announce that we are making your provider's discharge notes available to you in Wavebreak Media. You will see these notes when they are completed and signed by the physician that discharged you from your recent hospital stay. If you have any questions or concerns about any information you see in Wavebreak Media, please call the Health Information Department where you were seen or reach out to your Primary Care Provider for more information about your plan of care. Introducing [facility] & HEALTH SERVICES! Dat Scanlon introduce portal paciente Wavebreak Media . Ahora se puede acceder a partes de montero expediente médico, enviar por correo electrónico la oficina de montero médico y solicitar renovaciones de medicamentos en línea. En montero navegador de Internet , Amberly Enamorado a https://[email or web address] Armasight/Perception Softwaret Natalie clic en el usuario por Dash Trevizo? Jennifer Jaffe clic aquí en la sesión SecondHome Portal. Verá la página de registro [place]. Ingrese montero código de Bank of July amy y manuel aparece a continuación. Usted no tendrá que [employer]um[employer]ident código después de kelly completado el proceso de registro . Si usted no se inscribe antes de la fecha de caducidad , debe solicitar un nuevo código. · Marine Current Turbineshart Código de acceso : [id number] Expires: 10/25/2018 11:20 AM 
 
 Ingresa los últimos cuatro dígitos de montero Número de Seguro Social ( xxxx ) y fecha de nacimiento ( dd / mm / aaaa ) manuel se indica y natalie clic en Enviar. Usted será llevado a la siguiente página de registro . Crear un ID MyChart . Esta será montero ID de inicio de sesión de MyChart y no puede ser [place] , por lo que pensar en saleem que es Fantasma Peer y fácil de recordar . Crear saleem contraseña MyChart . Usted puede cambiar montero contraseña en cualquier momento . Ingrese montero Password Reset de preguntas y Song . [place] se puede utilizar en un momento posterior si usted olvida montero contraseña. Introduzca montero dirección de correo electrónico . Riya Commons recibirá saleem notificación por correo electrónico cuando la nueva información está disponible en MyChart . Dyann Lauth clic en Registrarse. Rheta Acre jocelin y descargar porciones de montero expediente médico. 
Natalie clic en el enlace de descarga del menú Resumen para descargar saleem copia portátil de montero información médica . Si tiene Therese Janes & Co , por favor visite la sección de preguntas frecuentes del sitio web MyChart . Recuerde, MyChart NO es que se utilizará para las necesidades urgentes. Para emergencias médicas , llame al 911 . Ahora disponible en montero iPhone y Android ! Introducing Prabhjot Geiger As a [facility] patient, I wanted to make you aware of our electronic visit tool called Prabhjot Geiger. [facility] 24/7 allows you to connect within minutes with a medical provider 24 hours a day, seven days a week via a mobile device or tablet or logging into a secure website from your computer. You can access Prabhjot Griffithsnazariofin from anywhere in the [place].  
 
A virtual visit might be right for you when you have a simple condition and feel like you just dont want to get out of bed, or cant get away from work for an appointment, when your regular [facility] provider is not available (evenings, weekends or holidays), or when youre out of town and need minor care. Electronic visits cost only $49 and if the Pharmacopeia 24/Minutta provider determines a prescription is needed to treat your condition, one can be electronically transmitted to a nearby pharmacy*. Please take a moment to enroll today if you have not already done so. The enrollment process is free and takes just a few minutes. To enroll, please download the Stima Systems/Minutta reyes to your tablet or phone, or visit [email or web address] org to enroll on your computer. And, as an [address] [place] [address] patient with a Volly account, the results of your visits will be scanned into your electronic medical record and your primary care provider will be able to view the scanned results. We urge you to continue to see your [facility] for your ongoing medical care. And while your primary care provider may not be the one available when you seek a DeluxeBoxnazariofin virtual visit, the peace of mind you get from getting a real diagnosis real time can be priceless. For more information on Continuum [employer], view our Frequently Asked Questions (FAQs) at www.[email or web address] org. Sincerely, 
 
Laura Esqueda MD 
Chief Medical Officer [address] *:  certain medications cannot be prescribed via [employer] Providers Seen During Your Hospitalization Personal Médico Especialidad Teléfono principal de la oficina Cheyanne Montelongo MD Internal Medicine [phone] Matt Ca MD Internal Medicine [phone] Luiz Santizo MD Internal Medicine [phone] Michelle Dunbar MD Hospitalist [phone] [place] Pu Administradas en esta admisión:    
   
 Rush Santamaria Influenza Vaccine (Quad) PF 9/25/2018 [place] médico de atención primaria (PCP ) Primary Care Physician Office Phone Office Fax Georgette Walters [phone] Tiene alergias a lo siguiente No tiene alergias Documentación recientes Height Weight BMI ([facility]) Estatus de tabaquísmo 1.626 m 64.5 kg 24.41 kg/m2 Never Smoker Emergency Contacts Name Discharge Info Relation Home Work Mobile [address] A CAREGIVER [3] Other Relative [6] [phone] Patient Belongings The following personal items are in your possession at time of discharge: 
     Visual Aid: None             Clothing: Footwear, Pants, Shirt    Other Valuables: Cell Phone Discharge Instructions Attachments/References OSTEOMYELITIS (Dominican) Patient Handouts Osteomielitis: Instrucciones de cuidado - [ Osteomyelitis: Care Instructions ] Instrucciones de cuidado La osteomielitis es la infección de un hueso. Es causada por bacterias. Las bacterias pueden infectar un hueso en el que hay saleem lesión o pueden ser transportadas por la lb desde otra parte del cuerpo. La osteomielitis puede ser un problema a corto o juan david plazo. Se trata con antibióticos. Podría recibir los antibióticos en forma oral (de pastillas) o intravenosa (IV). Es probable que jarad reciba tratamiento en el hospital. El tipo de tratamiento depende de las bacterias que provoquen la infección, los huesos afectados y la gravedad de la infección. A veces, las personas necesitan cirugía para drenar pus del hueso o reparar el hueso lesionado. La osteomielitis a corto plazo que se trata de inmediato generalmente se puede curar. Jorge L, a veces, la osteomielitis a juan david plazo reaparece después del tratamiento. Esperanza los medicamentos según las indicaciones puede mejorar las probabilidades de detener la infección. La atención de seguimiento es saleem parte clave de montero tratamiento y seguridad. Asegúrese de hacer y acudir a todas las citas, y llame a montero médico si está teniendo problemas. También es saleem buena idea saber los resultados de isha exámenes y mantener saleem lista de los medicamentos que anna. Cómo puede cuidarse en el hogar? · [employer] International antibióticos según las indicaciones. No deje de tomarlos por el hecho de sentirse mejor. Debe jhoana todos los antibióticos hasta terminarlos. · [employer] International analgésicos (medicamentos para el dolor) exactamente según las indicaciones. ¨ Si el médico le recetó un analgésico, tómelo según las indicaciones. ¨ Si no está tomando un analgésico recetado, pregúntele a montero médico si puede jhoana un medicamento de [facility] American. · Landon Matos y estiramiento suaves si montero médico lo aprueba. [place] puede ayudar a [employer] y los músculos sanos. Evite hacer actividades o ejercicio vigorosos [employer] montero médico le diga que puede [place]. · Considere hacer fisioterapia si montero médico se lo sugiere. La fisioterapia podría ayudarlo a tener saleem amplitud de movimiento normal. 
· No fume. Fumar puede retardar la curación de la infección. Si necesita ayuda para dejar de fumar, hable con montero médico sobre programas y medicamentos para dejar de fumar. Estos pueden aumentar isha probabilidades de dejar el hábito para siempre. Cuándo debe pedir ayuda? Llame al 911 en cualquier momento que considere que necesita atención de [place]. Por ejemplo, llame si: 
  · Tiene dolor intenso en el hueso.  
 Llame a montero médico ahora mismo o busque atención médica inmediata si: 
  · Sigue teniendo dolor en el hueso.  
  · Tiene señales de infección, tales manuel: ¨ Aumento del dolor, la hinchazón, el enrojecimiento o la temperatura. ¨ Vetas rojizas que salen de saleem herida. ¨ Pus que sale de saleem herida. Oval Radha especial atención a los cambios en montero adan y asegúrese de comunicarse con montero médico si: 
  · No mejora manuel se esperaba. Dónde puede encontrar más información en inglés? Padilla Whelan a http://mya-narinder.info/. Rios E568 en la búsqueda para aprender más acerca de \"Osteomielitis: Instrucciones de cuidado - [ Osteomyelitis: Care Instructions ]. \" 
Revisado: 21 noviembre, 2017 Versión del contenido: 11.7 © 9747-8909 [facility], Incorporated. Las instrucciones de cuidado fueron adaptadas bajo licencia por Good Help Connections (which disclaims liability or warranty for this information). Si usted tiene [place] afección médica o sobre estas instrucciones, siempre pregunte a montero profesional de adan. [facility]orporated niega toda garantía o responsabilidad por montero uso de esta información. Please provide this summary of care documentation to your next provider. Signatures-by signing, you are acknowledging that this After Visit Summary has been reviewed with you and you have received a copy. Patient Signature:  ____________________________________________________________ Date:  ____________________________________________________________  
  
[facility] Provider Signature:  ____________________________________________________________ Date:  ____________________________________________________________

## 2018-08-24 NOTE — ROUTINE PROCESS
Bedside and Verbal shift change report given to Lashaun Tsang RN (oncoming nurse) by Kathya Pressley RN (offgoing nurse). Report included the following information SBAR, Kardex, ED Summary, Intake/Output, MAR and Recent Results.

## 2018-08-24 NOTE — WOUND CARE
Wound care to reassess and change the dressing to the left upper arm residual limb present on admission. Medicated for pain prior to care given.       Assessment  Left upper residual arm limb- skin graft to anterior and posterior surgical wound- anterior area is less dry with large areas of dry adherent thick tan scabs, tissue under the scabs intact- scattered  areas of red hypergranulated tissue scattered- no drainage to  upper arm at the posterior axilla has a small area- less than 2x2 cm with dry black adherent eshar- edges area moist and lifting, sanguinous drainage- no odor. Posterior graft is red and granulating, edges pink, no drainage- yung wound is scarred.      Treatment  Wounds cleansed, moisturized- intrasite to all open areas- covered with adaptic and dry dressing - tolerated well. Staff advised of care given.    Recommendations-   Continue every other day  wound care-  facilitating discharge plan. Will follow- plans to discharge to [address] [place] [address] for care.     Tania Laughter RN Smith Bumpers
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
## 2018-08-24 NOTE — PROGRESS NOTES
Bedside and Verbal shift change report given to Roman Arizmendi (oncoming nurse) by Susannah Serrano RN (offgoing nurse). Report included the following information SBAR and Kardex.

## 2018-08-24 NOTE — IP AVS SNAPSHOT
[facility] 
 
 
 [address] Rorye Anton Gee Patient: Johanna Rodriges MRN: [id number] :1970 A check kartik indicates which time of day the medication should be taken. My Medications START taking these medications Instructions Each Dose to Equal  
 Morning Noon Evening Bedtime  
 lidocaine 4 % topical cream  
Commonly known as:  XYLOCAINE Your last dose was: Your next dose is:    
   
   
 Apply  to affected area four (4) times daily. Apply to affected area STOP taking these medications HYDROcodone-acetaminophen 5-325 mg per tablet Commonly known as:  NORCO  
   
  
 piperacillin-tazobactam 3.375 gram IVPB  
   
  
 vancomycin 1,000 mg, ADDaptor 1 Device IVPB Where to Get Your Medications These medications were sent to [facility] 149, [address] [facility]  [address] [address] [facility], [address] [place] [address]   Phone:  [phone]  
  lidocaine 4 % topical cream

## 2018-08-24 NOTE — DISCHARGE SUMMARY
Physician Discharge Summary     Patient ID:  Arlene Willoughby  [id number]  46 y.o.  1970    Admit date: 8/16/2018    Discharge date: 8/24/2018    Admission Diagnoses: Osteomyelitis [facility])    Discharge Diagnoses:  Principal Diagnosis Osteomyelitis ([facility]                                            Principal Problem:    Osteomyelitis ([facility] (8/16/2018)         Resolved Problems:  Problem List as of 8/24/2018  Date Reviewed: 8/16/2018          Codes Class Noted - Resolved    * (Principal)Osteomyelitis ([facility] ICD-10-CM: M86.9  ICD-9-CM: 730.20  8/16/2018 - Present                Hospital Course: This is an interim summary and covers the hospitalization from 8/21/2018 to 8/24/2018. For any preceding portion of the patient's hospitalization, please see my partner's notes. MRI confirmed osteomyelitis of the distal humerus. Dr. Janet Day recommended 6 weeks of IV antibiotics (see his orders for details). Because no safe disposition was available due to patient's lack of resources, CM arranged transfer to [facility] for long term IV antibiotics    He was discharged to [facility] on 8/24/2018 in improved condition. PCP: Usha Jauregui, MD    Consults: ID and Orthopedic Surgery    Discharge Exam:  See my Progress Note from today. Disposition: acute [facility] hospital    Patient Instructions:   Current Discharge Medication List      START taking these medications    Details   piperacillin-tazobactam 3.375 gram IVPB Per Dr. Tr Haley orders  Refills: 0      vancomycin 1,000 mg, ADDaptor 1 Device IVPB Per Dr. Tr Haley orders  Refills: 0      HYDROcodone-acetaminophen (NORCO) 5-325 mg per tablet Take 1 Tab by mouth every four (4) hours as needed. Max Daily Amount: 6 Tabs.     Associated Diagnoses: Acute osteomyelitis of right upper arm (HCC)         STOP taking these medications       oxyCODONE-acetaminophen (PERCOCET) 5-325 mg per tablet Comments:   Reason for Stopping:         metroNIDAZOLE (FLAGYL) 500 mg tablet Comments:   Reason for Stopping:              Activity: Activity as tolerated  Diet: Regular Diet  Wound Care: Cleanse the left residual limb with soap and water, moisturize dry areas within the healed graft on the anterior surface, apply intrasite gel to all open areas on the anterior and posterior graft - cover with adaptic, dry dressing, kerlix wrap EVERY OTHER DAY    Follow-up Information     Follow up With Details Comments Contact Info    Phys Other, MD   Patient can only remember the practice name and not the physician            25 minutes were spent on this discharge.     Signed:  Mere Tai MD  8/24/2018  10:58 AM

## 2018-08-24 NOTE — H&P
Hospitalist Admission Note    NAME: Jocelin Palma   :  1970   MRN:  [id number]     Date/Time:  2018 5:03 PM    Patient PCP: Usha Jauregui MD  ______________________________________________________________________  Given the patient's current clinical presentation, I have a high level of concern for decompensation if discharged from the emergency department. Complex decision making was performed, which includes reviewing the patient's available past medical records, laboratory results, and x-ray films. My assessment of this patient's clinical condition and my plan of care is as follows. Assessment / Plan:      Humerus steomyelitis ([address] (2018)  - long term IV vancomycin and zosyn through 10/1/2018  - ID will follow along  - pain controlled with oral meds    Code Status: full  Surrogate Decision Maker:    DVT Prophylaxis: lovenox  GI Prophylaxis: not indicated    Baseline: independent      Subjective:   CHIEF COMPLAINT: transferred from [facility] for prolonged IV abx for     HISTORY OF PRESENT ILLNESS:  Pt speaks German only. Unfortunately Zyncd is not working and I donot have an available German speaking interpretor. I had minimal communication with patient. Information is obtained from [facility] EMR. Jocelin Palma is a 46 y.o.  male was sent to us from [facility] for completion of IV abx. He has had h/o traumatic arm amputation at [address] [place] [address] and Wound progresseively worsened after discharge. He was admitted at [facility] on . CT in ER has a suggestion of osteomyelitis. MRI confirmed osteomyelitis of the distal humerus amputation stump. No intraosseous abscess. It also showed  Heterogeneous muscle edema includes biceps, triceps, deltoid, rotator cuff, and teres muscles. Cultures growing pseudomonas. Seen by orthopedic and has not favored surgical intervention. Seen by ID and recommended long term abx. We were asked to admit for work up and evaluation of the above problems.      Past Medical History:   Diagnosis Date    History of vascular access device 08/23/2018    5fr dual lumen PICC RUE basilic vein long-term IV abx        Past Surgical History:   Procedure Laterality Date    HX AMPUTATION UPPER EXTREMITY         Social History   Substance Use Topics    Smoking status: Never Smoker    Smokeless tobacco: Never Used    Alcohol use No        Family history: As per initial H and P: Patient does not know details of family medical issues  No Known Allergies     Prior to Admission medications    Medication Sig Start Date End Date Taking? Authorizing Provider   piperacillin-tazobactam 3.375 gram IVPB Per Dr. Yann Mills orders 8/24/18   Rob Fang MD   vancomycin 1,000 mg, ADDaptor 1 Device IVPB Per Dr. Yann Mills orders 8/24/18   Rob Fang MD   HYDROcodone-acetaminophen [facility]) 5-325 mg per tablet Take 1 Tab by mouth every four (4) hours as needed. Max Daily Amount: 6 Tabs. 8/24/18   Rob Fang MD       REVIEW OF SYSTEMS:     I am not able to complete the review of systems because:    The patient is intubated and sedated    The patient has altered mental status due to his acute medical problems    The patient has baseline aphasia from prior stroke(s)    The patient has baseline dementia and is not reliable historian    The patient is in acute medical distress and unable to provide information   X Limited due to language barrier       Total of 12 systems reviewed as follows:       POSITIVE= underlined text  Negative = text not underlined  General:  fever, chills, sweats, generalized weakness, weight loss/gain,      loss of appetite   Eyes:    blurred vision, eye pain, loss of vision, double vision  ENT:    rhinorrhea, pharyngitis   Respiratory:   cough, sputum production, SOB, AIKEN, wheezing, pleuritic pain   Cardiology:   chest pain, palpitations, orthopnea, PND, edema, syncope   Gastrointestinal:  abdominal pain , N/V, diarrhea, dysphagia, constipation, bleeding   Genitourinary: frequency, urgency, dysuria, hematuria, incontinence   Muskuloskeletal :  arthralgia, myalgia, back pain  Hematology:  easy bruising, nose or gum bleeding, lymphadenopathy   Dermatological: rash, ulceration, pruritis, color change / jaundice  Endocrine:   hot flashes or polydipsia   Neurological:  headache, dizziness, confusion, focal weakness, paresthesia,     Speech difficulties, memory loss, gait difficulty  Psychological: Feelings of anxiety, depression, agitation    Objective:   VITALS:    There were no vitals taken for this visit. PHYSICAL EXAM:    General:    Alert, cooperative, no distress, appears stated age. HEENT: Atraumatic, anicteric sclerae, pink conjunctivae     No oral ulcers, mucosa moist, throat clear, dentition fair  Neck:  Supple, symmetrical,  thyroid: non tender  Lungs:   Clear to auscultation bilaterally. No Wheezing or Rhonchi. No rales. Chest wall:  No tenderness  No Accessory muscle use. Heart:   Regular  rhythm,  No  murmur   No edema  Abdomen:   Soft, non-tender. Not distended. Bowel sounds normal  Extremities: No cyanosis. No clubbing,      Skin turgor normal, Capillary refill normal, Radial dial pulse 2+  Skin:     Not pale. Not Jaundiced  No rashes   Psych:  Good insight. Not depressed. Not anxious or agitated. Neurologic: EOMs intact. No facial asymmetry. No aphasia or slurred speech. Symmetrical strength, Sensation grossly intact.  Alert and oriented X 4.     _______________________________________________________________________  Care Plan discussed with:    Comments   Patient x    Family      RN x    Care Manager                    Consultant:      _______________________________________________________________________  Expected  Disposition:   Home with Family x   HH/PT/OT/RN    SNF/LTC    ZACHARY    ________________________________________________________________________  TOTAL TIME:  61 Minutes    Critical Care Provided     Minutes non procedure based      Comments x Reviewed previous records   >50% of visit spent in counseling and coordination of care  Discussion with patient and/or family and questions answered       ________________________________________________________________________  Signed: Benito Rosales MD    Procedures: see electronic medical records for all procedures/Xrays and details which were not copied into this note but were reviewed prior to creation of Plan.     LAB DATA REVIEWED:    Recent Results (from the past 24 hour(s))   CREATININE    Collection Time: 08/24/18  3:38 AM   Result Value Ref Range    Creatinine 0.67 (L) 0.70 - 1.30 MG/DL    GFR est AA >60 >60 ml/min/1.73m2    GFR est non-AA >60 >60 ml/min/1.73m2

## 2018-08-24 NOTE — PROGRESS NOTES
.. TRANSFER - IN REPORT:    Verbal report received from Vee Altman, (name) on Anand Montez  being received from [facility] for routine progression of care      Report consisted of patients Situation, Background, Assessment and   Recommendations(SBAR). Information from the following report(s) SBAR, Kardex, MAR and Accordion was reviewed with the receiving nurse. Opportunity for questions and clarification was provided. Assessment completed upon patients arrival to unit and care assumed.

## 2018-08-24 NOTE — PROGRESS NOTES
[address] [address] Pharmacy Dosing Services: Antimicrobial Stewardship      Consult for antibiotic dosing of vancomycin by Dr. Tray Cruz (continuing regimen per transfer from [place] [address])  Indication: Osteomyelitis (confirmed on MRI)  Day of Therapy: Restarted 8/21--> Day 4  [Previous Vanc 8/16-8/19 (last dose 8/19 @ 0600 then DC'ed) now restarted since 8/21/18]       Vancomycin therapy:  Current maintenance dose: 1000 (mg) every 8 hours   Dose calculated to approximate a therapeutic trough of 15-20 mcg/mL. Last trough level 15 mcg/ml on 8/21/18 on 1000 mg IV q 8 hours   Plan for level / Adjustment in Therapy: continue current regimen     FYI : BMP and CBC ordered for AM 8/25/18     Other Antimicrobial   (not dosed by pharmacist) Zosyn 3.375 g IV q 8 hours    Cultures 8/16 - Wound - heavy pseudomonas aeruginosa (final)  8/16 - Blood - NGTD (final)  8/16 - Arm Wound - light pseudomonas (final)   Serum Creatinine        Lab Results    Component Value Date/Time     Creatinine 0.67 (L) 08/24/2018 03:38 AM       Creatinine Clearance Estimated Creatinine Clearance: 110.4 mL/min (based on Cr of 0.67).    Temp Temp: 98.4 °F (36.8 °C)     WBC        Lab Results    Component Value Date/Time     WBC 4.5 08/22/2018 12:46 AM       H/H        Lab Results    Component Value Date/Time     HGB 12.0 (L) 08/22/2018 12:46 AM       Platelets           Lab Results   Component Value Date/Time     PLATELET 889 92/95/8452 12:46 AM         Vinod Cardenas [facility]  Contact [phone]

## 2018-08-24 NOTE — PROGRESS NOTES
1915) Bedside and Verbal shift change report given to YARELIS Ware (oncoming nurse) by Gillian Rodrigues RN and SANDY Rasmussen (offgoing nurse). Report included the following information SBAR, Kardex, OR Summary, Procedure Summary, Intake/Output, MAR, Accordion, Recent Results and Med Rec Status. 2255) Patient complaining of 8 out of 10 pain. The next dosage of Norco is due in 2 hours. Consulted with Dr Beata Sam to modify the dosage to every 4 hours. 5153) Bedside and Verbal shift change report given to Dilcia Harris RN (oncoming nurse) by YARELIS Ware (offgoing nurse). Report included the following information SBAR, Kardex, OR Summary, Procedure Summary, Intake/Output, MAR, Accordion, Recent Results and Med Rec Status.

## 2018-08-24 NOTE — PROGRESS NOTES
[facility] Coordinator for Care Management   Dr. Isabelle Small has accepted the patient for transfer today. She wants transportation for the patient to arrive by 3:30PM.  Please document patient is still in agreement for transfer.   [address]  Call report to nursing [phone] - going to room 214   If any special wound care supplies please send with patient  CM report to me  Marilia 59 MSW RN

## 2018-08-25 LAB
ANION GAP SERPL CALC-SCNC: 5 MMOL/L (ref 5–15)
BUN SERPL-MCNC: 9 MG/DL (ref 6–20)
BUN/CREAT SERPL: 15 (ref 12–20)
CALCIUM SERPL-MCNC: 8.5 MG/DL (ref 8.5–10.1)
CHLORIDE SERPL-SCNC: 105 MMOL/L (ref 97–108)
CO2 SERPL-SCNC: 30 MMOL/L (ref 21–32)
CREAT SERPL-MCNC: 0.61 MG/DL (ref 0.7–1.3)
ERYTHROCYTE [DISTWIDTH] IN BLOOD BY AUTOMATED COUNT: 13.2 % (ref 11.5–14.5)
GLUCOSE SERPL-MCNC: 89 MG/DL (ref 65–100)
HCT VFR BLD AUTO: 34.9 % (ref 36.6–50.3)
HGB BLD-MCNC: 11.5 G/DL (ref 12.1–17)
MCH RBC QN AUTO: 29.9 PG (ref 26–34)
MCHC RBC AUTO-ENTMCNC: 33 G/DL (ref 30–36.5)
MCV RBC AUTO: 90.6 FL (ref 80–99)
NRBC # BLD: 0 K/UL (ref 0–0.01)
NRBC BLD-RTO: 0 PER 100 WBC
PLATELET # BLD AUTO: 250 K/UL (ref 150–400)
PMV BLD AUTO: 9.4 FL (ref 8.9–12.9)
POTASSIUM SERPL-SCNC: 3.5 MMOL/L (ref 3.5–5.1)
RBC # BLD AUTO: 3.85 M/UL (ref 4.1–5.7)
SODIUM SERPL-SCNC: 140 MMOL/L (ref 136–145)
WBC # BLD AUTO: 3.9 K/UL (ref 4.1–11.1)

## 2018-08-25 PROCEDURE — 74011250637 HC RX REV CODE- 250/637: Performed by: EMERGENCY MEDICINE

## 2018-08-25 PROCEDURE — 74011000258 HC RX REV CODE- 258: Performed by: HOSPITALIST

## 2018-08-25 PROCEDURE — 74011250636 HC RX REV CODE- 250/636: Performed by: HOSPITALIST

## 2018-08-25 PROCEDURE — 80048 BASIC METABOLIC PNL TOTAL CA: CPT | Performed by: HOSPITALIST

## 2018-08-25 PROCEDURE — 36415 COLL VENOUS BLD VENIPUNCTURE: CPT | Performed by: HOSPITALIST

## 2018-08-25 PROCEDURE — 85027 COMPLETE CBC AUTOMATED: CPT | Performed by: HOSPITALIST

## 2018-08-25 RX ADMIN — PIPERACILLIN SODIUM AND TAZOBACTAM SODIUM 3.38 G: 3; .375 INJECTION, POWDER, LYOPHILIZED, FOR SOLUTION INTRAVENOUS at 18:03

## 2018-08-25 RX ADMIN — Medication 10 ML: at 01:03

## 2018-08-25 RX ADMIN — Medication 10 ML: at 14:00

## 2018-08-25 RX ADMIN — PIPERACILLIN SODIUM AND TAZOBACTAM SODIUM 3.38 G: 3; .375 INJECTION, POWDER, LYOPHILIZED, FOR SOLUTION INTRAVENOUS at 03:32

## 2018-08-25 RX ADMIN — Medication 10 ML: at 06:36

## 2018-08-25 RX ADMIN — PIPERACILLIN SODIUM AND TAZOBACTAM SODIUM 3.38 G: 3; .375 INJECTION, POWDER, LYOPHILIZED, FOR SOLUTION INTRAVENOUS at 10:22

## 2018-08-25 RX ADMIN — VANCOMYCIN HYDROCHLORIDE 1000 MG: 1 INJECTION, POWDER, LYOPHILIZED, FOR SOLUTION INTRAVENOUS at 22:28

## 2018-08-25 RX ADMIN — VANCOMYCIN HYDROCHLORIDE 1000 MG: 1 INJECTION, POWDER, LYOPHILIZED, FOR SOLUTION INTRAVENOUS at 14:38

## 2018-08-25 RX ADMIN — Medication 10 ML: at 01:04

## 2018-08-25 RX ADMIN — ENOXAPARIN SODIUM 40 MG: 40 INJECTION, SOLUTION INTRAVENOUS; SUBCUTANEOUS at 14:38

## 2018-08-25 RX ADMIN — HYDROCODONE BITARTRATE AND ACETAMINOPHEN 1 TABLET: 5; 325 TABLET ORAL at 18:00

## 2018-08-25 RX ADMIN — VANCOMYCIN HYDROCHLORIDE 1000 MG: 1 INJECTION, POWDER, LYOPHILIZED, FOR SOLUTION INTRAVENOUS at 06:35

## 2018-08-25 RX ADMIN — Medication 10 ML: at 03:36

## 2018-08-25 RX ADMIN — Medication 20 ML: at 22:28

## 2018-08-25 RX ADMIN — HYDROCODONE BITARTRATE AND ACETAMINOPHEN 1 TABLET: 5; 325 TABLET ORAL at 08:29

## 2018-08-25 NOTE — PROGRESS NOTES
[facility] Admission Pharmacy Medication Reconciliation     Transfer from [facility] to [facility] for long term IV antibiotics      Information obtained from: Transfer medical records      Patient Active Problem List   Diagnosis Code    Osteomyelitis ([facility] M86.9         Past Medical History/Disease States:  Past Medical History:   Diagnosis Date    History of vascular access device 08/23/2018    5fr dual lumen PICC RUE basilic vein long-term IV abx     Patient allergies: Allergies as of 08/24/2018    (No Known Allergies)       Prior to Admission Medications   Prescriptions Last Dose Informant Patient Reported? Taking? HYDROcodone-acetaminophen (NORCO) 5-325 mg per tablet 8/24/2018 at Unknown time Transfer Papers Yes Yes   Sig: Take 1 Tab by mouth every four (4) hours as needed. Max Daily Amount: 6 Tabs.    piperacillin-tazobactam 3.375 gram IVPB 8/24/2018 at Unknown time Transfer Papers Yes Yes   Sig: Per Dr. Edel Martins orders   vancomycin 1,000 mg, ADDaptor 1 Device IVPB 8/24/2018 at Unknown time Transfer Papers Yes Yes   Sig: Per Dr. Edel Martins orders           Thank you,  Donna Glass, [facility]

## 2018-08-25 NOTE — PROGRESS NOTES
[address] [address] Pharmacy Dosing Services: Antimicrobial Stewardship      Consult for antibiotic dosing of vancomycin by Dr. Juanita Long (continuing regimen per transfer from [place] [address])  Indication: Osteomyelitis (confirmed on MRI)  Day of Therapy: Restarted 8/21--> Day 5  [Previous Vanc 8/16-8/19 (last dose 8/19 @ 0600 then DC'ed) now restarted since 8/21/18]       Vancomycin therapy:  Current maintenance dose: 1000 (mg) every 8 hours   Dose calculated to approximate a therapeutic trough of 15-20 mcg/mL. Last trough level 15 mcg/ml on 8/21/18 on 1000 mg IV q 8 hours   Plan for level / Adjustment in Therapy: continue current regimen     FYI : Daily SCr ordered; Random Vancomycin level ordered in AM 8/26/18     Other Antimicrobial   (not dosed by pharmacist) Zosyn 3.375 g IV q 8 hours    Cultures 8/16 - Wound - heavy pseudomonas aeruginosa (final)  8/16 - Blood - NGTD (final)  8/16 - Arm Wound - light pseudomonas (final)   Serum Creatinine        Lab Results    Component Value Date/Time     Creatinine 0.61 (L) 08/25/2018 03:32 AM       Creatinine Clearance Estimated Creatinine Clearance: 121.3 mL/min (based on Cr of 0.61).    Temp Temp: 98.3 °F (36.8 °C)     WBC        Lab Results    Component Value Date/Time     WBC 3.9 08/25/2018 0322 AM       H/H        Lab Results    Component Value Date/Time     HGB 11.5 (L) 08/25/2018  0322 AM       Platelets           Lab Results   Component Value Date/Time     PLATELET 047 10/96/8496 0322 AM         Caryle Medley, RPh  Contact [phone]

## 2018-08-25 NOTE — PROGRESS NOTES
Problem: Falls - Risk of  Goal: *Absence of Falls  Document Noemy Fall Risk and appropriate interventions in the flowsheet.    Outcome: Progressing Towards Goal  Fall Risk Interventions:            Medication Interventions: Teach patient to arise slowly

## 2018-08-25 NOTE — PROGRESS NOTES
Bedside shift change report given to me (oncoming nurse) by Axel Livingston (offgoing nurse). Report included the following information SBAR, Kardex, Intake/Output, MAR, Accordion and Recent Results. Bedside shift change report given to [facility] RN (oncoming nurse) by me (offgoing nurse). Report included the following information SBAR, Kardex, Intake/Output, MAR, Accordion and Recent Results.

## 2018-08-25 NOTE — PROGRESS NOTES
Problem: Falls - Risk of  Goal: *Absence of Falls  Document Noemy Fall Risk and appropriate interventions in the flowsheet.    Fall Risk Interventions:            Medication Interventions: Teach patient to arise slowly

## 2018-08-25 NOTE — PROGRESS NOTES
Hospitalist Progress Note    NAME: Sherry Morris   :  1970   MRN:  [id number]       Assessment / Plan:     Humerus steomyelitis ([address] (2018)  - long term IV vancomycin and zosyn through 10/1/2018  - ID will follow along  - pain controlled with oral meds     Code Status: full  Surrogate Decision Maker: Josecindy Granger, cousin     DVT Prophylaxis: lovenox  GI Prophylaxis: not indicated     Baseline: independent     Subjective:     Chief Complaint / Reason for Physician Visit  Pt speaks minimal english obtained information directly from patient  \"doing ok\". Discussed with RN events overnight. Review of Systems:  Symptom Y/N Comments  Symptom Y/N Comments   Fever/Chills    Chest Pain n    Poor Appetite n   Edema     Cough    Abdominal Pain n    Sputum    Joint Pain y    SOB/AIKEN n   Pruritis/Rash     Nausea/vomit n   Tolerating PT/OT y    Diarrhea n   Tolerating Diet y    Constipation    Other       Could NOT obtain due to:      Objective:     VITALS:   Last 24hrs VS reviewed since prior progress note. Most recent are:  Patient Vitals for the past 24 hrs:   Temp Pulse Resp BP SpO2   18 0337 98 °F (36.7 °C) 64 16 144/75 99 %   18 2000 99 °F (37.2 °C) 73 16 138/85 98 %   18 1540 98 °F (36.7 °C) 76 14 147/78 98 %       Intake/Output Summary (Last 24 hours) at 18 1003  Last data filed at 18 4235   Gross per 24 hour   Intake              570 ml   Output                0 ml   Net              570 ml        PHYSICAL EXAM:  General: Alert, cooperative, no acute distress    EENT:  EOMI. Anicteric sclerae. MMM  Resp:  CTA bilaterally, no wheezing or rales.   No accessory muscle use  CV:  Regular  rhythm,  No edema  GI:  Soft, Non distended, Non tender.  +Bowel sounds  Neurologic:  Alert and oriented X 3, normal speech,   Psych:   Good insight. Not anxious nor agitated  Ext:  Left arm stump in dressing    Reviewed most current lab test results and cultures  YES  Reviewed most current radiology test results   YES  Review and summation of old records today    NO  Reviewed patient's current orders and MAR    YES  PMH/SH reviewed - no change compared to H&P  ________________________________________________________________________  Care Plan discussed with:    Comments   Patient x    Family      RN x    Care Manager x    Consultant                        Multidiciplinary team rounds were held today with , nursing, pharmacist and clinical coordinator. Patient's plan of care was discussed; medications were reviewed and discharge planning was addressed. ________________________________________________________________________  Total NON critical care TIME:  30  Minutes    Total CRITICAL CARE TIME Spent:   Minutes non procedure based      Comments   >50% of visit spent in counseling and coordination of care     ________________________________________________________________________  Lyn Schulte MD     Procedures: see electronic medical records for all procedures/Xrays and details which were not copied into this note but were reviewed prior to creation of Plan. LABS:  I reviewed today's most current labs and imaging studies.   Pertinent labs include:  Recent Labs      08/25/18   0322   WBC  3.9*   HGB  11.5*   HCT  34.9*   PLT  250     Recent Labs      08/25/18   0322  08/24/18   0338  08/23/18   0309   NA  140   --    --    K  3.5   --    --    CL  105   --    --    CO2  30   --    --    GLU  89   --    --    BUN  9   --    --    CREA  0.61*  0.67*  0.75   CA  8.5   --    --        Signed: Lyn Schulte MD

## 2018-08-26 LAB
CREAT SERPL-MCNC: 0.63 MG/DL (ref 0.7–1.3)
VANCOMYCIN SERPL-MCNC: 22.5 UG/ML

## 2018-08-26 PROCEDURE — 74011000258 HC RX REV CODE- 258: Performed by: HOSPITALIST

## 2018-08-26 PROCEDURE — 36415 COLL VENOUS BLD VENIPUNCTURE: CPT | Performed by: HOSPITALIST

## 2018-08-26 PROCEDURE — 80202 ASSAY OF VANCOMYCIN: CPT | Performed by: HOSPITALIST

## 2018-08-26 PROCEDURE — 82565 ASSAY OF CREATININE: CPT | Performed by: HOSPITALIST

## 2018-08-26 PROCEDURE — 74011250637 HC RX REV CODE- 250/637: Performed by: EMERGENCY MEDICINE

## 2018-08-26 PROCEDURE — 36592 COLLECT BLOOD FROM PICC: CPT

## 2018-08-26 PROCEDURE — 74011250636 HC RX REV CODE- 250/636: Performed by: HOSPITALIST

## 2018-08-26 RX ADMIN — VANCOMYCIN HYDROCHLORIDE 1000 MG: 1 INJECTION, POWDER, LYOPHILIZED, FOR SOLUTION INTRAVENOUS at 14:13

## 2018-08-26 RX ADMIN — Medication 10 ML: at 21:59

## 2018-08-26 RX ADMIN — HYDROCODONE BITARTRATE AND ACETAMINOPHEN 1 TABLET: 5; 325 TABLET ORAL at 10:46

## 2018-08-26 RX ADMIN — Medication 10 ML: at 07:21

## 2018-08-26 RX ADMIN — PIPERACILLIN SODIUM AND TAZOBACTAM SODIUM 3.38 G: 3; .375 INJECTION, POWDER, LYOPHILIZED, FOR SOLUTION INTRAVENOUS at 03:35

## 2018-08-26 RX ADMIN — PIPERACILLIN SODIUM AND TAZOBACTAM SODIUM 3.38 G: 3; .375 INJECTION, POWDER, LYOPHILIZED, FOR SOLUTION INTRAVENOUS at 10:43

## 2018-08-26 RX ADMIN — PIPERACILLIN SODIUM AND TAZOBACTAM SODIUM 3.38 G: 3; .375 INJECTION, POWDER, LYOPHILIZED, FOR SOLUTION INTRAVENOUS at 18:31

## 2018-08-26 RX ADMIN — ENOXAPARIN SODIUM 40 MG: 40 INJECTION, SOLUTION INTRAVENOUS; SUBCUTANEOUS at 14:13

## 2018-08-26 RX ADMIN — VANCOMYCIN HYDROCHLORIDE 1000 MG: 1 INJECTION, POWDER, LYOPHILIZED, FOR SOLUTION INTRAVENOUS at 07:21

## 2018-08-26 RX ADMIN — VANCOMYCIN HYDROCHLORIDE 1000 MG: 1 INJECTION, POWDER, LYOPHILIZED, FOR SOLUTION INTRAVENOUS at 21:59

## 2018-08-26 RX ADMIN — HYDROCODONE BITARTRATE AND ACETAMINOPHEN 1 TABLET: 5; 325 TABLET ORAL at 22:25

## 2018-08-26 RX ADMIN — Medication 10 ML: at 14:14

## 2018-08-26 NOTE — PROGRESS NOTES
Bedside shift change report given to Gayatri Casillas 1154 (oncoming nurse) by Sydni Clements (offgoing nurse). Report included the following information SBAR, Kardex, Intake/Output, MAR, Accordion and Recent Results.

## 2018-08-26 NOTE — PROGRESS NOTES
[facility] Dosing Services: Antimicrobial Stewardship    Consult for antibiotic dosing of vancomycin by Dr. Ledy Malloy (continuing regimen per transfer from [facility])  Indication: Osteomyelitis (confirmed on MRI)  Day of Therapy: Day 6  (Restarted 8/21) Continue Vanco/Zosyn regimen until 10/1/18  [Previous Vanc 8/16-8/19 (last dose 8/19 @ 0600 then DC'ed) now restarted since 8/21/18]    Ht Readings from Last 1 Encounters:   08/23/18 162.6 cm (64\")        Wt Readings from Last 1 Encounters:   08/24/18 64.5 kg (142 lb 3.2 oz)        Vancomycin therapy:  Current maintenance dose: 1000 (mg) every 8 hours   Dose calculated to approximate a therapeutic trough of 15-20 mcg/mL. Last RANDOM level extrapolated to 15.68 mcg/ml (22.5 mcg/mL drawn 5 hours after start of previous dose) on 8/26/18 on 1000 mg IV q 8 hours   Plan for level / Adjustment in Therapy: Continue current regimen     Other Antimicrobial   (not dosed by pharmacist) Zosyn 3.375 g IV q 8 hours    Cultures 8/16 - Wound - heavy pseudomonas aeruginosa (final)  8/16 - Blood - NGTD (final)  8/16 - Arm Wound - light pseudomonas (final)   Serum Creatinine Lab Results   Component Value Date/Time    Creatinine 0.63 (L) 08/26/2018 03:43 AM         Creatinine Clearance Estimated Creatinine Clearance: 117.5 mL/min (based on Cr of 0.63).      Temp Temp: 98.3 °F (36.8 °C)       WBC Lab Results   Component Value Date/Time    WBC 3.9 (L) 08/25/2018 03:22 AM        H/H Lab Results   Component Value Date/Time    HGB 11.5 (L) 08/25/2018 03:22 AM        Platelets    Lab Results   Component Value Date/Time    PLATELET 413 86/67/3766 03:22 AM            Pharmacist Munira Gutierrez [facility] Contact information: ext [phone]

## 2018-08-26 NOTE — PROGRESS NOTES
Bedside shift change report given to me (oncoming nurse) by [facility] RN (offgoing nurse). Report included the following information SBAR, Kardex, Intake/Output, MAR, Accordion, Recent Results and Med Rec Status. 0930 Patient understands minimal english and even speaks it. 1911  Bedside shift change report given to [address] [place] [address] Shahzad  (oncoming nurse) by me (offgoing nurse). Report included the following information SBAR, Kardex, Intake/Output, MAR, Accordion, Recent Results and Med Rec Status.

## 2018-08-26 NOTE — PROGRESS NOTES
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 Discussed with RN events overnight. Review of Systems:  Symptom Y/N Comments  Symptom Y/N Comments   Fever/Chills    Chest Pain     Poor Appetite n   Edema     Cough    Abdominal Pain n    Sputum    Joint Pain y    SOB/AIKEN n   Pruritis/Rash     Nausea/vomit n   Tolerating PT/OT y    Diarrhea n   Tolerating Diet y    Constipation    Other       Could NOT obtain due to:      Objective:     VITALS:   Last 24hrs VS reviewed since prior progress note. Most recent are:  Patient Vitals for the past 24 hrs:   Temp Pulse Resp BP SpO2   18 0348 98.3 °F (36.8 °C) 67 18 117/55 98 %   18 1941 98.9 °F (37.2 °C) 67 18 141/71 98 %   18 1753 98.3 °F (36.8 °C) 72 18 105/67 96 %       Intake/Output Summary (Last 24 hours) at 18 1259  Last data filed at 18 0335   Gross per 24 hour   Intake              590 ml   Output                0 ml   Net              590 ml        PHYSICAL EXAM:  General: Alert, cooperative, no acute distress    EENT:  EOMI. Anicteric sclerae. MMM  Resp:  CTA bilaterally, no wheezing or rales.   No accessory muscle use  CV:  Regular  rhythm,  No edema  GI:  Soft, Non distended, Non tender.  +Bowel sounds  Neurologic:  Alert and oriented X 3, normal speech,   Psych:   Good insight. Not anxious nor agitated  Ext:  Left arm stump in dressing    Reviewed most current lab test results and cultures  YES  Reviewed most current radiology test results   YES  Review and summation of old records today    NO  Reviewed patient's current orders and MAR    YES  PMH/SH reviewed - no change compared to H&P  ________________________________________________________________________  Care Plan discussed with:    Comments   Patient x    Family      RN x    Care Manager x    Consultant                        Multidiciplinary team rounds were held today with , nursing, pharmacist and clinical coordinator. Patient's plan of care was discussed; medications were reviewed and discharge planning was addressed. ________________________________________________________________________  Total NON critical care TIME:  30  Minutes    Total CRITICAL CARE TIME Spent:   Minutes non procedure based      Comments   >50% of visit spent in counseling and coordination of care     ________________________________________________________________________  Alphonso Mccloud MD     Procedures: see electronic medical records for all procedures/Xrays and details which were not copied into this note but were reviewed prior to creation of Plan. LABS:  I reviewed today's most current labs and imaging studies.   Pertinent labs include:  Recent Labs      08/25/18   0322   WBC  3.9*   HGB  11.5*   HCT  34.9*   PLT  250     Recent Labs      08/26/18 0343  08/25/18 0322 08/24/18   0338   NA   --   140   --    K   --   3.5   --    CL   --   105   --    CO2   --   30   --    GLU   --   89   --    BUN   --   9   --    CREA  0.63*  0.61*  0.67*   CA   --   8.5   --        Signed: Alphonso Mccloud MD

## 2018-08-27 LAB — CREAT SERPL-MCNC: 0.64 MG/DL (ref 0.7–1.3)

## 2018-08-27 PROCEDURE — 77030009526 HC GEL CARSYN MDII -A

## 2018-08-27 PROCEDURE — 36592 COLLECT BLOOD FROM PICC: CPT

## 2018-08-27 PROCEDURE — 82565 ASSAY OF CREATININE: CPT | Performed by: HOSPITALIST

## 2018-08-27 PROCEDURE — 36415 COLL VENOUS BLD VENIPUNCTURE: CPT | Performed by: HOSPITALIST

## 2018-08-27 PROCEDURE — 97602 WOUND(S) CARE NON-SELECTIVE: CPT

## 2018-08-27 PROCEDURE — 74011250637 HC RX REV CODE- 250/637: Performed by: EMERGENCY MEDICINE

## 2018-08-27 PROCEDURE — 74011250636 HC RX REV CODE- 250/636: Performed by: HOSPITALIST

## 2018-08-27 PROCEDURE — 74011000258 HC RX REV CODE- 258: Performed by: HOSPITALIST

## 2018-08-27 RX ADMIN — VANCOMYCIN HYDROCHLORIDE 1000 MG: 1 INJECTION, POWDER, LYOPHILIZED, FOR SOLUTION INTRAVENOUS at 22:41

## 2018-08-27 RX ADMIN — ENOXAPARIN SODIUM 40 MG: 40 INJECTION, SOLUTION INTRAVENOUS; SUBCUTANEOUS at 14:20

## 2018-08-27 RX ADMIN — PIPERACILLIN SODIUM AND TAZOBACTAM SODIUM 3.38 G: 3; .375 INJECTION, POWDER, LYOPHILIZED, FOR SOLUTION INTRAVENOUS at 17:43

## 2018-08-27 RX ADMIN — Medication 10 ML: at 14:00

## 2018-08-27 RX ADMIN — PIPERACILLIN SODIUM AND TAZOBACTAM SODIUM 3.38 G: 3; .375 INJECTION, POWDER, LYOPHILIZED, FOR SOLUTION INTRAVENOUS at 10:39

## 2018-08-27 RX ADMIN — VANCOMYCIN HYDROCHLORIDE 1000 MG: 1 INJECTION, POWDER, LYOPHILIZED, FOR SOLUTION INTRAVENOUS at 14:20

## 2018-08-27 RX ADMIN — Medication 20 ML: at 22:40

## 2018-08-27 RX ADMIN — Medication 10 ML: at 06:07

## 2018-08-27 RX ADMIN — PIPERACILLIN SODIUM AND TAZOBACTAM SODIUM 3.38 G: 3; .375 INJECTION, POWDER, LYOPHILIZED, FOR SOLUTION INTRAVENOUS at 01:34

## 2018-08-27 RX ADMIN — VANCOMYCIN HYDROCHLORIDE 1000 MG: 1 INJECTION, POWDER, LYOPHILIZED, FOR SOLUTION INTRAVENOUS at 06:05

## 2018-08-27 RX ADMIN — HYDROCODONE BITARTRATE AND ACETAMINOPHEN 1 TABLET: 5; 325 TABLET ORAL at 10:50

## 2018-08-27 NOTE — WOUND CARE
Wound care consult from floor: This is 46year old male patient transferred from [facility] for continuation of care. He has Humerus osteomyelitis. Dressing changed today. He has an open area  Anterior side about  5 cm length 1 cm width with scant drainage. . Another 5 small opening on the posterior side with scant drainage. Cleaned the wound with NS and applied small amount of Carrasyn gel to the wound place non adherant  Dressing on that covered with agustin and medi pore tape. Secured the dressing with ace bandage and stockinet to hold the dressing. Recommendation:   Daily Clean the wound with NS and apply small amount of Carrasyn gel to the wound. Place non adherant  Dressing on that covered with agustin and medi pore tape. Secured the dressing with ace bandage and stockinet to hold the dressing.

## 2018-08-27 NOTE — PROGRESS NOTES
1919: Bedside shift change report given to Lizzy Vargas (oncoming nurse) by Erika Wilcox (offgoing nurse). Report included the following information SBAR, Kardex, ED Summary, Intake/Output, MAR and Recent Results. 7162: Bedside shift change report given to Eladia (oncoming nurse) by Lizzy Vargas (offgoing nurse). Report included the following information SBAR, Kardex, ED Summary, Intake/Output, MAR and Recent Results.

## 2018-08-27 NOTE — PROGRESS NOTES
Problem: Falls - Risk of  Goal: *Absence of Falls  Document Noemy Fall Risk and appropriate interventions in the flowsheet.    Outcome: Progressing Towards Goal  Fall Risk Interventions:            Medication Interventions: Teach patient to arise slowly         History of Falls Interventions: Evaluate medications/consider consulting pharmacy

## 2018-08-27 NOTE — PROGRESS NOTES
Hospitalist Progress Note    NAME: Mana Cornejo   :  1970   MRN:  [id number]       Assessment / Plan:     Humerus steomyelitis (2018): POA  - long term IV vancomycin and zosyn through 10/1/2018  - ID will follow along  - pain controlled with oral meds     Code Status: full  Surrogate Decision Maker: Vera Bullock, cousin     DVT Prophylaxis: lovenox  GI Prophylaxis: not indicated     Baseline: independent     Subjective:     Chief Complaint / Reason for Physician Visit  Pt speaks minimal english obtained information directly from patient  \"No complaints\". Discussed with RN events overnight. Review of Systems:  Symptom Y/N Comments  Symptom Y/N Comments   Fever/Chills    Chest Pain     Poor Appetite n   Edema     Cough    Abdominal Pain n    Sputum    Joint Pain y    SOB/AIKEN n   Pruritis/Rash     Nausea/vomit n   Tolerating PT/OT y    Diarrhea n   Tolerating Diet y    Constipation    Other       Could NOT obtain due to:      Objective:     VITALS:   Last 24hrs VS reviewed since prior progress note. Most recent are:  Patient Vitals for the past 24 hrs:   Temp Pulse Resp BP SpO2   18 0749 98.2 °F (36.8 °C) 73 16 140/76 98 %   18 0430 97.9 °F (36.6 °C) 71 16 131/75 100 %   18 1955 97.8 °F (36.6 °C) 85 18 150/76 100 %   18 1614 99.3 °F (37.4 °C) 76 18 120/60 99 %       Intake/Output Summary (Last 24 hours) at 18 0819  Last data filed at 18 0645   Gross per 24 hour   Intake             1540 ml   Output                0 ml   Net             1540 ml        PHYSICAL EXAM:  General: Alert, cooperative, no acute distress    EENT:  EOMI. Anicteric sclerae. MMM  Resp:  CTA bilaterally, no wheezing or rales.   No accessory muscle use  CV:  Regular  rhythm,  No edema  GI:  Soft, Non distended, Non tender.  +Bowel sounds  Neurologic:  Alert and oriented X 3, normal speech,   Psych:   Good insight. Not anxious nor agitated  Ext:  Left arm stump in dressing    Reviewed most current lab test results and cultures  YES  Reviewed most current radiology test results   YES  Review and summation of old records today    NO  Reviewed patient's current orders and MAR    YES  PMH/SH reviewed - no change compared to H&P  ________________________________________________________________________  Care Plan discussed with:    Comments   Patient x    Family      RN x    Care Manager x    Consultant                        Multidiciplinary team rounds were held today with , nursing, pharmacist and clinical coordinator. Patient's plan of care was discussed; medications were reviewed and discharge planning was addressed. ________________________________________________________________________  Total NON critical care TIME:  30  Minutes    Total CRITICAL CARE TIME Spent:   Minutes non procedure based      Comments   >50% of visit spent in counseling and coordination of care     ________________________________________________________________________  Marya Tucker MD     Procedures: see electronic medical records for all procedures/Xrays and details which were not copied into this note but were reviewed prior to creation of Plan. LABS:  I reviewed today's most current labs and imaging studies.   Pertinent labs include:  Recent Labs      08/25/18 0322   WBC  3.9*   HGB  11.5*   HCT  34.9*   PLT  250     Recent Labs      08/27/18   0509  08/26/18   0343  08/25/18   0322   NA   --    --   140   K   --    --   3.5   CL   --    --   105   CO2   --    --   30   GLU   --    --   89   BUN   --    --   9   CREA  0.64*  0.63*  0.61*   CA   --    --   8.5       Signed: Marya Tucker MD

## 2018-08-27 NOTE — PROGRESS NOTES
Bedside shift change report given to me (oncoming nurse) by Faina Lan (offgoing nurse). Report included the following information SBAR, Kardex, Procedure Summary, Intake/Output, MAR and Accordion. 1400 Dressing change completed to left upper arm. Tolerated well. 1950 Bedside shift change report given to [facility] RN (oncoming nurse) by me (offgoing nurse). Report included the following information SBAR, Kardex, Intake/Output, MAR, Accordion, Recent Results and Med Rec Status.

## 2018-08-28 ENCOUNTER — DOCUMENTATION ONLY (OUTPATIENT)
Dept: FAMILY MEDICINE CLINIC | Age: 48
End: 2018-08-28

## 2018-08-28 LAB — CREAT SERPL-MCNC: 0.72 MG/DL (ref 0.7–1.3)

## 2018-08-28 PROCEDURE — 74011250636 HC RX REV CODE- 250/636: Performed by: HOSPITALIST

## 2018-08-28 PROCEDURE — 36415 COLL VENOUS BLD VENIPUNCTURE: CPT | Performed by: HOSPITALIST

## 2018-08-28 PROCEDURE — 74011000258 HC RX REV CODE- 258: Performed by: HOSPITALIST

## 2018-08-28 PROCEDURE — 74011250637 HC RX REV CODE- 250/637: Performed by: EMERGENCY MEDICINE

## 2018-08-28 PROCEDURE — 74011250637 HC RX REV CODE- 250/637: Performed by: HOSPITALIST

## 2018-08-28 PROCEDURE — 82565 ASSAY OF CREATININE: CPT | Performed by: HOSPITALIST

## 2018-08-28 RX ORDER — HEPARIN 100 UNIT/ML
300 SYRINGE INTRAVENOUS AS NEEDED
Status: DISCONTINUED | OUTPATIENT
Start: 2018-08-28 | End: 2018-10-02 | Stop reason: HOSPADM

## 2018-08-28 RX ADMIN — Medication 20 ML: at 06:24

## 2018-08-28 RX ADMIN — Medication 10 ML: at 22:54

## 2018-08-28 RX ADMIN — Medication 10 ML: at 16:16

## 2018-08-28 RX ADMIN — Medication 1 CAPSULE: at 12:12

## 2018-08-28 RX ADMIN — HYDROCODONE BITARTRATE AND ACETAMINOPHEN 1 TABLET: 5; 325 TABLET ORAL at 22:22

## 2018-08-28 RX ADMIN — ENOXAPARIN SODIUM 40 MG: 40 INJECTION, SOLUTION INTRAVENOUS; SUBCUTANEOUS at 16:16

## 2018-08-28 RX ADMIN — VANCOMYCIN HYDROCHLORIDE 1000 MG: 1 INJECTION, POWDER, LYOPHILIZED, FOR SOLUTION INTRAVENOUS at 06:23

## 2018-08-28 RX ADMIN — VANCOMYCIN HYDROCHLORIDE 1000 MG: 1 INJECTION, POWDER, LYOPHILIZED, FOR SOLUTION INTRAVENOUS at 22:40

## 2018-08-28 RX ADMIN — PIPERACILLIN SODIUM AND TAZOBACTAM SODIUM 3.38 G: 3; .375 INJECTION, POWDER, LYOPHILIZED, FOR SOLUTION INTRAVENOUS at 18:20

## 2018-08-28 RX ADMIN — Medication 10 ML: at 22:40

## 2018-08-28 RX ADMIN — VANCOMYCIN HYDROCHLORIDE 1000 MG: 1 INJECTION, POWDER, LYOPHILIZED, FOR SOLUTION INTRAVENOUS at 16:13

## 2018-08-28 RX ADMIN — PIPERACILLIN SODIUM AND TAZOBACTAM SODIUM 3.38 G: 3; .375 INJECTION, POWDER, LYOPHILIZED, FOR SOLUTION INTRAVENOUS at 02:40

## 2018-08-28 RX ADMIN — PIPERACILLIN SODIUM AND TAZOBACTAM SODIUM 3.38 G: 3; .375 INJECTION, POWDER, LYOPHILIZED, FOR SOLUTION INTRAVENOUS at 10:08

## 2018-08-28 NOTE — PROGRESS NOTES
1920) Bedside and Verbal shift change report given to YARELIS Ware (oncoming nurse) by Amira Garza RN (offgoing nurse). Report included the following information SBAR, Kardex, Intake/Output, MAR, Accordion, Recent Results and Med Rec Status. 2205) Family at the bedside. 2222) Flushed purple port with 3-10 ml normal saline flushes forcefully. The purple port flushes but there is no blood return. 0) Consulted Dr Diana Rodriguez for an order for a heparin flush received approval.   
2317) [address] [place] [address] (nursing supervisor) changed the ports and was able to get blood return on the purple side. 5675) Bedside and Verbal shift change report given to Isrrael Weiss RN (oncoming nurse) by YARELIS Ware (offgoing nurse). Report included the following information SBAR, Kardex, Intake/Output, MAR, Accordion, Recent Results and Med Rec Status.

## 2018-08-28 NOTE — PROGRESS NOTES
Problem: Falls - Risk of 
Goal: *Absence of Falls Document [facility] Fall Risk and appropriate interventions in the flowsheet. Outcome: Progressing Towards Goal 
Fall Risk Interventions: 
  
 
  
 
Medication Interventions: Teach patient to arise slowly History of Falls Interventions: Evaluate medications/consider consulting pharmacy

## 2018-08-28 NOTE — PROGRESS NOTES
0710hrs . Kevin Yanes Bedside and Verbal shift change report given to Lilia Dunham (oncoming nurse) by Deonte Carlisle (offgoing nurse). Report included the following information SBAR, Kardex, Intake/Output, MAR, Recent Results and Med Rec Status. Wound dressing done on left arm amputation wound. Tolerated well. 1910hrs Bedside and Verbal shift change report given to [address]) by [facility] nurse). Report included the following information SBAR, Kardex, Intake/Output, MAR, Recent Results and Med Rec Status.

## 2018-08-28 NOTE — PROGRESS NOTES
Bedside shift change report given to YARELIS Holloway (oncoming nurse) by Chris Dinh (offgoing nurse). Report included the following information SBAR, Kardex, Intake/Output, MAR and Recent Results.

## 2018-08-28 NOTE — PROGRESS NOTES
Hospitalist Progress Note NAME: Eloy Dc :  1970 MRN:  [id number] Assessment / Plan: 
  
Humerus steomyelitis (2018): POA 
- long term IV vancomycin and zosyn through 10/1/2018 
- ID Dr Tee Zhang will follow along - pain controlled with oral meds 
  
Code Status: full Surrogate Decision Maker: Brenden Mondragon, cousin 
  
DVT Prophylaxis: lovenox GI Prophylaxis: not indicated 
  
Baseline: independent Subjective: Chief Complaint / Reason for Physician Visit Pt speaks minimal english obtained information directly from patient \"ok\". Discussed with RN events overnight. Review of Systems: 
Symptom Y/N Comments  Symptom Y/N Comments Fever/Chills    Chest Pain Poor Appetite n   Edema Cough    Abdominal Pain n   
Sputum    Joint Pain y   
SOB/AIKEN n   Pruritis/Rash Nausea/vomit n   Tolerating PT/OT y Diarrhea n   Tolerating Diet y Constipation    Other Could NOT obtain due to:   
 
Objective: VITALS:  
Last 24hrs VS reviewed since prior progress note. Most recent are: 
Patient Vitals for the past 24 hrs: 
 Temp Pulse Resp BP SpO2  
18 0800 98.2 °F (36.8 °C) 72 18 120/77 100 % 18 0403 98.9 °F (37.2 °C) 66 18 153/70 100 % 18 1955 98.6 °F (37 °C) 80 18 145/71 100 % Intake/Output Summary (Last 24 hours) at 18 1624 Last data filed at 18 1310 Gross per 24 hour Intake              590 ml Output                0 ml Net              590 ml PHYSICAL EXAM: 
General: Alert, cooperative, no acute distress   
EENT:  EOMI. Anicteric sclerae. MMM Resp:  CTA bilaterally, no wheezing or rales. No accessory muscle use CV:  Regular  rhythm,  No edema GI:  Soft, Non distended, Non tender.  +Bowel sounds Neurologic:  Alert and oriented X 3, normal speech, Psych:   Good insight. Not anxious nor agitated Ext:  Left arm stump in dressing Reviewed most current lab test results and cultures  YES 
 Reviewed most current radiology test results   YES Review and summation of old records today    NO Reviewed patient's current orders and MAR    YES 
PMH/SH reviewed - no change compared to H&P 
________________________________________________________________________ Care Plan discussed with: 
  Comments Patient x Family RN x Care Manager x Consultant Multidiciplinary team rounds were held today with , nursing, pharmacist and clinical coordinator. Patient's plan of care was discussed; medications were reviewed and discharge planning was addressed. ________________________________________________________________________ Total NON critical care TIME:  20  Minutes Total CRITICAL CARE TIME Spent:   Minutes non procedure based Comments >50% of visit spent in counseling and coordination of care    
________________________________________________________________________ Davis Jaeger MD  
 
Procedures: see electronic medical records for all procedures/Xrays and details which were not copied into this note but were reviewed prior to creation of Plan. LABS: 
I reviewed today's most current labs and imaging studies. Pertinent labs include: No results for input(s): WBC, HGB, HCT, PLT, HGBEXT, HCTEXT, PLTEXT, HGBEXT, HCTEXT, PLTEXT in the last 72 hours. Recent Labs  
   08/28/18 
 9236  08/27/18 
 0509  08/26/18 
 0343 CREA  0.72  0.64*  0.63* Signed: Davis Jaeger MD

## 2018-08-28 NOTE — PROGRESS NOTES
Letter dated 8/14/18 asking pt to call office has been returned \"return to sender, attempted- not known, unable to forward\".

## 2018-08-29 LAB
CREAT SERPL-MCNC: 0.68 MG/DL (ref 0.7–1.3)
VANCOMYCIN SERPL-MCNC: 18.9 UG/ML

## 2018-08-29 PROCEDURE — 74011250637 HC RX REV CODE- 250/637: Performed by: HOSPITALIST

## 2018-08-29 PROCEDURE — 36415 COLL VENOUS BLD VENIPUNCTURE: CPT | Performed by: HOSPITALIST

## 2018-08-29 PROCEDURE — 74011000258 HC RX REV CODE- 258: Performed by: HOSPITALIST

## 2018-08-29 PROCEDURE — 74011250637 HC RX REV CODE- 250/637: Performed by: EMERGENCY MEDICINE

## 2018-08-29 PROCEDURE — 80202 ASSAY OF VANCOMYCIN: CPT | Performed by: HOSPITALIST

## 2018-08-29 PROCEDURE — 74011250636 HC RX REV CODE- 250/636: Performed by: HOSPITALIST

## 2018-08-29 PROCEDURE — 36592 COLLECT BLOOD FROM PICC: CPT

## 2018-08-29 PROCEDURE — 82565 ASSAY OF CREATININE: CPT | Performed by: HOSPITALIST

## 2018-08-29 RX ADMIN — PIPERACILLIN SODIUM AND TAZOBACTAM SODIUM 3.38 G: 3; .375 INJECTION, POWDER, LYOPHILIZED, FOR SOLUTION INTRAVENOUS at 02:28

## 2018-08-29 RX ADMIN — Medication 10 ML: at 14:49

## 2018-08-29 RX ADMIN — VANCOMYCIN HYDROCHLORIDE 1000 MG: 1 INJECTION, POWDER, LYOPHILIZED, FOR SOLUTION INTRAVENOUS at 22:26

## 2018-08-29 RX ADMIN — VANCOMYCIN HYDROCHLORIDE 1000 MG: 1 INJECTION, POWDER, LYOPHILIZED, FOR SOLUTION INTRAVENOUS at 14:41

## 2018-08-29 RX ADMIN — ENOXAPARIN SODIUM 40 MG: 40 INJECTION, SOLUTION INTRAVENOUS; SUBCUTANEOUS at 14:41

## 2018-08-29 RX ADMIN — HYDROCODONE BITARTRATE AND ACETAMINOPHEN 1 TABLET: 5; 325 TABLET ORAL at 14:40

## 2018-08-29 RX ADMIN — Medication 1 CAPSULE: at 09:42

## 2018-08-29 RX ADMIN — Medication 10 ML: at 06:18

## 2018-08-29 RX ADMIN — PIPERACILLIN SODIUM AND TAZOBACTAM SODIUM 3.38 G: 3; .375 INJECTION, POWDER, LYOPHILIZED, FOR SOLUTION INTRAVENOUS at 19:14

## 2018-08-29 RX ADMIN — HYDROCODONE BITARTRATE AND ACETAMINOPHEN 1 TABLET: 5; 325 TABLET ORAL at 09:42

## 2018-08-29 RX ADMIN — HYDROCODONE BITARTRATE AND ACETAMINOPHEN 1 TABLET: 5; 325 TABLET ORAL at 19:11

## 2018-08-29 RX ADMIN — VANCOMYCIN HYDROCHLORIDE 1000 MG: 1 INJECTION, POWDER, LYOPHILIZED, FOR SOLUTION INTRAVENOUS at 06:05

## 2018-08-29 RX ADMIN — HYDROCODONE BITARTRATE AND ACETAMINOPHEN 1 TABLET: 5; 325 TABLET ORAL at 23:37

## 2018-08-29 RX ADMIN — Medication 10 ML: at 22:26

## 2018-08-29 RX ADMIN — PIPERACILLIN SODIUM AND TAZOBACTAM SODIUM 3.38 G: 3; .375 INJECTION, POWDER, LYOPHILIZED, FOR SOLUTION INTRAVENOUS at 09:41

## 2018-08-29 NOTE — PROGRESS NOTES
Problem: Falls - Risk of 
Goal: *Absence of Falls Document Willi Cornea Fall Risk and appropriate interventions in the flowsheet. Outcome: Progressing Towards Goal 
Fall Risk Interventions: 
  
 
  
 
Medication Interventions: Teach patient to arise slowly History of Falls Interventions: Evaluate medications/consider consulting pharmacy

## 2018-08-29 NOTE — PROGRESS NOTES
Hospitalist Progress Note NAME: Nelson Pollack :  1970 MRN:  [id number] Assessment / Plan: 
 
Long term patient transferred from [facility] for completion of antibiotics Update- 
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 
long term IV vancomycin and zosyn through 10/1/2018 ID Dr Keegan Ruiz will follow along 
 pain controlled with oral meds 
  
Code Status: full Surrogate Decision Maker: Brenden Mondragon, sujatasin 
  
DVT Prophylaxis: lovenox GI Prophylaxis: not indicated 
  
Baseline: independent Subjective: Chief Complaint / Reason for Physician Visit Pt speaks minimal english obtained information directly from patient \"ok\". Discussed with RN events overnight. Review of Systems: 
Symptom Y/N Comments  Symptom Y/N Comments Fever/Chills    Chest Pain n   
Poor Appetite n   Edema Cough    Abdominal Pain n   
Sputum    Joint Pain y   
SOB/AIKEN n   Pruritis/Rash Nausea/vomit n   Tolerating PT/OT y Diarrhea n   Tolerating Diet y Constipation    Other Could NOT obtain due to:   
 
Objective: VITALS:  
Last 24hrs VS reviewed since prior progress note. Most recent are: 
Patient Vitals for the past 24 hrs: 
 Temp Pulse Resp BP SpO2  
18 1524 98.5 °F (36.9 °C) 80 18 128/85 98 % 18 0941 98.2 °F (36.8 °C) 80 18 115/80 98 % 18 0450 98 °F (36.7 °C) 79 16 113/78 98 % 18 2020 98.4 °F (36.9 °C) 81 15 136/77 100 % 18 1727 97.6 °F (36.4 °C) 75 16 120/77 98 % Intake/Output Summary (Last 24 hours) at 18 1532 Last data filed at 18 9034 Gross per 24 hour Intake              850 ml Output                0 ml Net              850 ml PHYSICAL EXAM: 
General: Alert, cooperative, no acute distress   
EENT:  EOMI. Anicteric sclerae. MMM Resp:  CTA bilaterally, no wheezing or rales. No accessory muscle use CV:  Regular  rhythm,  No edema GI:  Soft, Non distended, Non tender.  +Bowel sounds Neurologic:  Alert and oriented X 3, normal speech, Psych:   Good insight. Not anxious nor agitated Ext:  Left arm stump in dressing Reviewed most current lab test results and cultures  YES Reviewed most current radiology test results   YES Review and summation of old records today    NO Reviewed patient's current orders and MAR    YES 
PMH/SH reviewed - no change compared to H&P 
________________________________________________________________________ Care Plan discussed with: 
  Comments Patient x Family RN x Care Manager x Consultant Multidiciplinary team rounds were held today with , nursing, pharmacist and clinical coordinator. Patient's plan of care was discussed; medications were reviewed and discharge planning was addressed. ________________________________________________________________________ Total NON critical care TIME:  30  Minutes Total CRITICAL CARE TIME Spent:   Minutes non procedure based Comments >50% of visit spent in counseling and coordination of care    
________________________________________________________________________ Wilma Nguyen MD  
 
Procedures: see electronic medical records for all procedures/Xrays and details which were not copied into this note but were reviewed prior to creation of Plan. LABS: 
I reviewed today's most current labs and imaging studies. Pertinent labs include: No results for input(s): WBC, HGB, HCT, PLT, HGBEXT, HCTEXT, PLTEXT, HGBEXT, HCTEXT, PLTEXT in the last 72 hours. Recent Labs  
   08/29/18 
 2272  08/28/18 
 3947  08/27/18 
 4881 CREA  0.68*  0.72  0.64* Signed: Wilma Nguyen MD

## 2018-08-29 NOTE — PROGRESS NOTES
Problem: Falls - Risk of 
Goal: *Absence of Falls Document Royce Titus Fall Risk and appropriate interventions in the flowsheet. Outcome: Progressing Towards Goal 
Fall Risk Interventions: 
  
 
  
 
Medication Interventions: Teach patient to arise slowly History of Falls Interventions: Evaluate medications/consider consulting pharmacy

## 2018-08-29 NOTE — PROGRESS NOTES
Bedside and Verbal shift change report given to Hector Dominguez (oncoming nurse) by Ada Phillips (offgoing nurse). Report included the following information SBAR, Kardex, MAR and Accordion. Patient in no acute distress. Denies pain currently, but describes periodic episodes of phantom pain or itching in the amputated limb. Discussed with Dr. Florence Gardner. Explained to the patient that there is not much we can do at this time for phantom pain. 7:15 PM Bedside and Verbal shift change report given to Nick (oncoming nurse) by Hector Dominguez (offgoing nurse). Report included the following information SBAR, Kardex, MAR and Accordion.

## 2018-08-29 NOTE — PROGRESS NOTES
[address] [address] Pharmacy Dosing Services: Antimicrobial Stewardship Daily Doc Consult for antibiotic dosing of vancomycin by Dr. Joellen Galan (continuing from [place] [facility]) Indication: osteomyelitis (confirmed on MRI) Day of Therapy 9 Ht Readings from Last 1 Encounters:  
08/23/18 162.6 cm (64\") Wt Readings from Last 1 Encounters:  
08/24/18 64.5 kg (142 lb 3.2 oz) Vancomycin therapy: 
Current maintenance dose: 1000 (mg) every 8 hours. Dose calculated to approximate a therapeutic trough of 15-20 mcg/mL. Last trough level 18.9 mcg/ml @ 0438 on 8/29 ~6 hours post dose, extrapolates to ~14.7 mcg/mL Plan for level / Adjustment in Therapy:afebrile, SCr stable; continue current dosing regimen Dose administration notes:   Doses given appropriately as scheduled Other Antimicrobial  
(not dosed by pharmacist) Zosyn 3.375G IV Q8H Cultures 8/16 wound: Pseudomonas (susc pip/tazo) @ Final 
8/16 blood: Negative @ Final 
8/16 wound: Pseudomonas (susc pip/tazo) @ Final  
Serum Creatinine Lab Results Component Value Date/Time Creatinine 0.68 (L) 08/29/2018 04:38 AM  
  
  
Creatinine Clearance Estimated Creatinine Clearance: 108.8 mL/min (based on Cr of 0.68). Temp Temp: 98 °F (36.7 °C) WBC Lab Results Component Value Date/Time WBC 3.9 (L) 08/25/2018 03:22 AM  
 
  
H/H Lab Results Component Value Date/Time HGB 11.5 (L) 08/25/2018 03:22 AM  
 
  
Platelets Lab Results Component Value Date/Time PLATELET 230 99/27/8471 03:22 AM  
 
  
 
 
Pharmacist Rosario Rose, PHARMD, BCPS Contact: [phone]

## 2018-08-30 LAB — CREAT SERPL-MCNC: 0.6 MG/DL (ref 0.7–1.3)

## 2018-08-30 PROCEDURE — 36415 COLL VENOUS BLD VENIPUNCTURE: CPT | Performed by: HOSPITALIST

## 2018-08-30 PROCEDURE — 74011250637 HC RX REV CODE- 250/637: Performed by: HOSPITALIST

## 2018-08-30 PROCEDURE — 74011250636 HC RX REV CODE- 250/636: Performed by: HOSPITALIST

## 2018-08-30 PROCEDURE — 74011250637 HC RX REV CODE- 250/637: Performed by: EMERGENCY MEDICINE

## 2018-08-30 PROCEDURE — 74011000258 HC RX REV CODE- 258: Performed by: HOSPITALIST

## 2018-08-30 PROCEDURE — 97602 WOUND(S) CARE NON-SELECTIVE: CPT

## 2018-08-30 PROCEDURE — 82565 ASSAY OF CREATININE: CPT | Performed by: HOSPITALIST

## 2018-08-30 RX ADMIN — Medication 10 ML: at 15:33

## 2018-08-30 RX ADMIN — VANCOMYCIN HYDROCHLORIDE 1000 MG: 1 INJECTION, POWDER, LYOPHILIZED, FOR SOLUTION INTRAVENOUS at 22:05

## 2018-08-30 RX ADMIN — ENOXAPARIN SODIUM 40 MG: 40 INJECTION, SOLUTION INTRAVENOUS; SUBCUTANEOUS at 13:55

## 2018-08-30 RX ADMIN — VANCOMYCIN HYDROCHLORIDE 1000 MG: 1 INJECTION, POWDER, LYOPHILIZED, FOR SOLUTION INTRAVENOUS at 13:54

## 2018-08-30 RX ADMIN — HYDROCODONE BITARTRATE AND ACETAMINOPHEN 1 TABLET: 5; 325 TABLET ORAL at 13:54

## 2018-08-30 RX ADMIN — Medication 1 CAPSULE: at 09:38

## 2018-08-30 RX ADMIN — PIPERACILLIN SODIUM AND TAZOBACTAM SODIUM 3.38 G: 3; .375 INJECTION, POWDER, LYOPHILIZED, FOR SOLUTION INTRAVENOUS at 09:38

## 2018-08-30 RX ADMIN — VANCOMYCIN HYDROCHLORIDE 1000 MG: 1 INJECTION, POWDER, LYOPHILIZED, FOR SOLUTION INTRAVENOUS at 07:11

## 2018-08-30 RX ADMIN — Medication 10 ML: at 22:05

## 2018-08-30 RX ADMIN — PIPERACILLIN SODIUM AND TAZOBACTAM SODIUM 3.38 G: 3; .375 INJECTION, POWDER, LYOPHILIZED, FOR SOLUTION INTRAVENOUS at 17:57

## 2018-08-30 RX ADMIN — PIPERACILLIN SODIUM AND TAZOBACTAM SODIUM 3.38 G: 3; .375 INJECTION, POWDER, LYOPHILIZED, FOR SOLUTION INTRAVENOUS at 03:39

## 2018-08-30 RX ADMIN — Medication 10 ML: at 07:12

## 2018-08-30 NOTE — PROGRESS NOTES
1920) Bedside and Verbal shift change report given to YARELIS Ware (oncoming nurse) by Josiah Paz RN (offgoing nurse). Report included the following information SBAR, Kardex, Intake/Output, MAR, Accordion, Recent Results and Med Rec Status. 0720) Bedside and Verbal shift change report given to Mark Anthony Irene RN (oncoming nurse) by YARELIS Ware (offgoing nurse). Report included the following information SBAR, Kardex, Intake/Output, MAR, Accordion, Recent Results and Med Rec Status.

## 2018-08-30 NOTE — PROGRESS NOTES
Problem: Falls - Risk of 
Goal: *Absence of Falls Document Kellie Gerardo Fall Risk and appropriate interventions in the flowsheet. Outcome: Progressing Towards Goal 
Fall Risk Interventions: 
  
 
  
 
Medication Interventions: Teach patient to arise slowly, Evaluate medications/consider consulting pharmacy History of Falls Interventions: Evaluate medications/consider consulting pharmacy

## 2018-08-30 NOTE — PROGRESS NOTES
Problem: Falls - Risk of 
Goal: *Absence of Falls Document Beulah Logan Fall Risk and appropriate interventions in the flowsheet. Outcome: Progressing Towards Goal 
Fall Risk Interventions: 
  
 
  
 
Medication Interventions: Teach patient to arise slowly History of Falls Interventions: Evaluate medications/consider consulting pharmacy

## 2018-08-30 NOTE — PROGRESS NOTES
0710hrs . Rosa Mendoza Bedside and Verbal shift change report given to Lilia Dunham (oncoming nurse) by Danielle Urena (offgoing nurse). Report included the following information SBAR, Kardex, Intake/Output, MAR, Recent Results and Med Rec Status. 1330hrs Wound dressing done on left arm. Tolerated well. 
1500hrs Dressing changed on IV PICC line. 1910hrs . Rosa Mendoza Bedside and Verbal shift change report given to [address] [place] [address] (oncoming nurse) by Lilia Dunham (offgoing nurse). Report included the following information SBAR, Kardex, Intake/Output, MAR, Recent Results and Med Rec Status.

## 2018-08-30 NOTE — PROGRESS NOTES
Hospitalist Progress Note NAME: Kristina Manjarrez :  1970 MRN:  [id number] Assessment / Plan: 
 
Long term patient transferred from [facility] for completion of antibiotics Update- 
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 
long term IV vancomycin and zosyn through 10/1/2018 ID Dr Reza Bloom will follow along 
 pain controlled with oral meds 
  
Code Status: full Surrogate Decision Maker: Brenden Mondragon cousin 
  
DVT Prophylaxis: lovenox GI Prophylaxis: not indicated 
  
Baseline: independent Subjective: Chief Complaint / Reason for Physician Visit Pt speaks minimal english obtained information directly from patient \"ok\". Discussed with RN events overnight. Review of Systems: 
Symptom Y/N Comments  Symptom Y/N Comments Fever/Chills    Chest Pain n   
Poor Appetite n   Edema Cough    Abdominal Pain n   
Sputum    Joint Pain y   
SOB/AIKEN n   Pruritis/Rash Nausea/vomit n   Tolerating PT/OT y Diarrhea n   Tolerating Diet y Constipation    Other Could NOT obtain due to:   
 
Objective: VITALS:  
Last 24hrs VS reviewed since prior progress note. Most recent are: 
Patient Vitals for the past 24 hrs: 
 Temp Pulse Resp BP SpO2  
18 0721 98.2 °F (36.8 °C) 73 16 134/74 98 % 18 0344 98 °F (36.7 °C) 75 18 130/78 100 % 18 2100 98 °F (36.7 °C) 77 18 130/75 100 % 18 1524 98.5 °F (36.9 °C) 80 18 128/85 98 % 18 0941 98.2 °F (36.8 °C) 80 18 115/80 98 % Intake/Output Summary (Last 24 hours) at 18 8069 Last data filed at 18 1200 Gross per 24 hour Intake              800 ml Output                0 ml Net              800 ml PHYSICAL EXAM: 
General: Alert, cooperative, no acute distress   
EENT:  EOMI. Anicteric sclerae. MMM Resp:  CTA bilaterally, no wheezing or rales. No accessory muscle use CV:  Regular  rhythm,  No edema GI:  Soft, Non distended, Non tender.  +Bowel sounds Neurologic:  Alert and oriented X 3, normal speech, Psych:   Good insight. Not anxious nor agitated Ext:  Left arm stump in dressing Reviewed most current lab test results and cultures  YES Reviewed most current radiology test results   YES Review and summation of old records today    NO Reviewed patient's current orders and MAR    YES 
PMH/SH reviewed - no change compared to H&P 
________________________________________________________________________ Care Plan discussed with: 
  Comments Patient x Family RN x Care Manager x Consultant Multidiciplinary team rounds were held today with , nursing, pharmacist and clinical coordinator. Patient's plan of care was discussed; medications were reviewed and discharge planning was addressed. ________________________________________________________________________ Total NON critical care TIME:  30  Minutes Total CRITICAL CARE TIME Spent:   Minutes non procedure based Comments >50% of visit spent in counseling and coordination of care    
________________________________________________________________________ Micheal Mack MD  
 
Procedures: see electronic medical records for all procedures/Xrays and details which were not copied into this note but were reviewed prior to creation of Plan. LABS: 
I reviewed today's most current labs and imaging studies. Pertinent labs include: No results for input(s): WBC, HGB, HCT, PLT, HGBEXT, HCTEXT, PLTEXT, HGBEXT, HCTEXT, PLTEXT in the last 72 hours. Recent Labs 08/30/18 
 0340  08/29/18 
 4291  08/28/18 
 0560 CREA  0.60*  0.68*  0.72 Signed: Micheal Mack MD

## 2018-08-30 NOTE — PROGRESS NOTES
Initial Nutrition Assessment: 
 
INTERVENTIONS/RECOMMENDATIONS:  
· Meals/Snacks: General/healthful diet ·  Ensure En[employer] lunch trays · Supplements: Commercial supplement · Feeding Assistance: Meal setup ·  MV/day ·  enc po and fluid intake ASSESSMENT:  
PT transferred from [facility] for completion of abx (Zosyn through 10/1). S/p L arm amp for humerus osteomyelitis. Hx notable for constipation. Limited chemistries available at the time of this note. Pt speaks minimal English. Eating reasonably well. Diet Order: Cardiac 
% Eaten:  Patient Vitals for the past 72 hrs: 
 % Diet Eaten 08/29/18 1200 100 % 08/29/18 0941 100 % Pertinent Medications: [x]Reviewed []Other Pertinent Labs: [x]Reviewed []Other Food Allergies: [x]None []Other Last BM:    [x]Active     []Hyperactive  []Hypoactive       [] Absent BS Skin:    [] Intact   [x] Incision  [x] Breakdown  [] Other: Anthropometrics:  
Height: 5' 4\" (162.6 cm) Weight: 64.5 kg (142 lb 3.2 oz) IBW (%IBW): 59 kg (130 lb) (109.38 %) UBW (%UBW):   (  %) Last Weight Metrics: 
Weight Loss Metrics 8/30/2018 8/23/2018 8/16/2018 8/16/2018 8/16/2018 7/27/2018 Today's Wt 142 lb 3.2 oz 143 lb - 144 lb - 140 lb BMI 24.41 kg/m2 - 24.55 kg/m2 - 24.72 kg/m2 24.03 kg/m2 BMI: Body mass index is 24.41 kg/(m^2). This BMI is indicative of: 
 []Underweight    [x]Normal    []Overweight    [] Obesity   [] Extreme Obesity (BMI>40) Estimated Nutrition Needs (Based on):  
1846 Kcals/day (1420 x 1.3 AF) , 72 g (1.2 g/kg) Protein Carbohydrate: At Least 130 g/day  Fluids: 1850 mL/day (1ml/kcal) NUTRITION DIAGNOSES:  
Problem:  Impaired ability to prepare food/meal     
Etiology: related to poor diet, poor compliance, infection Signs/Symptoms: as evidenced by BMI, labs, am   
 
NUTRITION INTERVENTIONS: 
Meals/Snacks: General/healthful diet   Supplements: Commercial supplement Feeding Assistance: Meal setup GOAL:  
 PO intake > 75% most meals LEARNING NEEDS (Diet, Food/Nutrient-Drug Interaction):  
 [x] None Identified 
 [] Identified and Education Provided/Documented 
 [] Identified and Pt declined/was not appropriate Cultureal, Amish, OR Ethnic Dietary Needs:  
 [x] None Identified 
 [] Identified and Addressed 
 
 [x] Interdisciplinary Care Plan Reviewed/Documented  
 [x] Discharge Planning:   Healthful diet with supplements as needed MONITORING /EVALUATIONFaustine Curb Outcomes: Behavior, Acceptance of assist with eating Food/Nutrient Intake Outcomes: Total energy intake Physical Signs/Symptoms Outcomes: Weight/weight change NUTRITION RISK:  
 [] Patient At Nutritional Risk 
  [] High              [] Moderate/Mild           [x]  Low   
 [] Patient Not At Nutritional Risk PT SEEN FOR:  
 []  MD Consult: []Calorie Count []Diabetic Diet Education []Diet Education []Electrolyte Management 
   [x]General Nutrition Management and Supplements []Management of Tube Feeding []TPN Recommendations []  RN Referral:  []MST score >=2 
   []Enteral/Parenteral Nutrition PTA []Pregnant: Gestational DM or Multigestation 
   []Pressure Ulcer/Wound Care needs 
     
[]  Low BMI 
[]  DARLING Bhakta, PhD, RD, CNSC Pager [phone]

## 2018-08-31 LAB — CREAT SERPL-MCNC: 0.64 MG/DL (ref 0.7–1.3)

## 2018-08-31 PROCEDURE — 36592 COLLECT BLOOD FROM PICC: CPT

## 2018-08-31 PROCEDURE — 36415 COLL VENOUS BLD VENIPUNCTURE: CPT | Performed by: HOSPITALIST

## 2018-08-31 PROCEDURE — 82565 ASSAY OF CREATININE: CPT | Performed by: HOSPITALIST

## 2018-08-31 PROCEDURE — 74011250637 HC RX REV CODE- 250/637: Performed by: EMERGENCY MEDICINE

## 2018-08-31 PROCEDURE — 97602 WOUND(S) CARE NON-SELECTIVE: CPT

## 2018-08-31 PROCEDURE — 74011250636 HC RX REV CODE- 250/636: Performed by: HOSPITALIST

## 2018-08-31 PROCEDURE — 74011000258 HC RX REV CODE- 258: Performed by: HOSPITALIST

## 2018-08-31 PROCEDURE — 74011250637 HC RX REV CODE- 250/637: Performed by: HOSPITALIST

## 2018-08-31 RX ADMIN — Medication 10 ML: at 06:36

## 2018-08-31 RX ADMIN — Medication 10 ML: at 14:47

## 2018-08-31 RX ADMIN — Medication 10 ML: at 02:16

## 2018-08-31 RX ADMIN — PIPERACILLIN SODIUM AND TAZOBACTAM SODIUM 3.38 G: 3; .375 INJECTION, POWDER, LYOPHILIZED, FOR SOLUTION INTRAVENOUS at 17:46

## 2018-08-31 RX ADMIN — PIPERACILLIN SODIUM AND TAZOBACTAM SODIUM 3.38 G: 3; .375 INJECTION, POWDER, LYOPHILIZED, FOR SOLUTION INTRAVENOUS at 02:16

## 2018-08-31 RX ADMIN — HYDROCODONE BITARTRATE AND ACETAMINOPHEN 1 TABLET: 5; 325 TABLET ORAL at 00:05

## 2018-08-31 RX ADMIN — VANCOMYCIN HYDROCHLORIDE 1000 MG: 1 INJECTION, POWDER, LYOPHILIZED, FOR SOLUTION INTRAVENOUS at 22:42

## 2018-08-31 RX ADMIN — VANCOMYCIN HYDROCHLORIDE 1000 MG: 1 INJECTION, POWDER, LYOPHILIZED, FOR SOLUTION INTRAVENOUS at 06:36

## 2018-08-31 RX ADMIN — Medication 1 CAPSULE: at 10:00

## 2018-08-31 RX ADMIN — ENOXAPARIN SODIUM 40 MG: 40 INJECTION, SOLUTION INTRAVENOUS; SUBCUTANEOUS at 14:18

## 2018-08-31 RX ADMIN — Medication 10 ML: at 22:44

## 2018-08-31 RX ADMIN — VANCOMYCIN HYDROCHLORIDE 1000 MG: 1 INJECTION, POWDER, LYOPHILIZED, FOR SOLUTION INTRAVENOUS at 14:18

## 2018-08-31 RX ADMIN — PIPERACILLIN SODIUM AND TAZOBACTAM SODIUM 3.38 G: 3; .375 INJECTION, POWDER, LYOPHILIZED, FOR SOLUTION INTRAVENOUS at 10:00

## 2018-08-31 RX ADMIN — HYDROCODONE BITARTRATE AND ACETAMINOPHEN 1 TABLET: 5; 325 TABLET ORAL at 22:48

## 2018-08-31 NOTE — PROGRESS NOTES
Bedside and Verbal shift change report given to [address] [facility] William rn (oncoming nurse) by Ryland Soni rn (offgoing nurse). Report included the following information SBAR, Kardex, Intake/Output, MAR, Recent Results and Med Rec Status.

## 2018-08-31 NOTE — PROGRESS NOTES
Hospitalist Progress Note NAME: Curtis Liu :  1970 MRN:  [id number] Assessment / Plan: 
  
Humerus steomyelitis (2018): POA 
- long term IV vancomycin and zosyn through 10/1/2018 
- ID Dr Aleyda Oconnor will follow along - pain controlled with oral meds 
  
Code Status: full Surrogate Decision Maker: Brenden Mondragon, cousin 
  
DVT Prophylaxis: lovenox GI Prophylaxis: not indicated 
  
Baseline: independent Subjective: Chief Complaint / Reason for Physician Visit Pt speaks minimal english obtained information directly from patient \"ok\". Discussed with RN events overnight. Review of Systems: 
Symptom Y/N Comments  Symptom Y/N Comments Fever/Chills    Chest Pain Poor Appetite n   Edema Cough    Abdominal Pain n   
Sputum    Joint Pain y   
SOB/AIKEN n   Pruritis/Rash Nausea/vomit n   Tolerating PT/OT y Diarrhea n   Tolerating Diet y Constipation    Other Could NOT obtain due to:   
 
Objective: VITALS:  
Last 24hrs VS reviewed since prior progress note. Most recent are: 
Patient Vitals for the past 24 hrs: 
 Temp Pulse Resp BP SpO2  
18 0804 98.2 °F (36.8 °C) 64 16 148/68 100 % 18 0005 98.4 °F (36.9 °C) 75 16 136/90 93 % 18 1640 98.1 °F (36.7 °C) 77 16 159/88 98 % Intake/Output Summary (Last 24 hours) at 18 2487 Last data filed at 18 5739 Gross per 24 hour Intake              300 ml Output                0 ml Net              300 ml PHYSICAL EXAM: 
General: no acute distress   
Resp:  CTA bilaterally GI:  Soft, Non distended, Non tender.  +Bowel sounds Neurologic:  Alert and oriented X 3, normal speech Psych:   Not anxious nor agitated Ext:  Left arm stump in dressing Reviewed most current lab test results and cultures  YES Reviewed most current radiology test results   YES Review and summation of old records today    NO Reviewed patient's current orders and MAR    YES 
 PMH/SH reviewed - no change compared to H&P 
________________________________________________________________________ Care Plan discussed with: 
  Comments Patient x Family RN x Care Manager x Consultant Multidiciplinary team rounds were held today with , nursing, pharmacist and clinical coordinator. Patient's plan of care was discussed; medications were reviewed and discharge planning was addressed. ________________________________________________________________________ Total NON critical care TIME:  20  Minutes Total CRITICAL CARE TIME Spent:   Minutes non procedure based Comments >50% of visit spent in counseling and coordination of care    
________________________________________________________________________ Leta Chin MD  
 
Procedures: see electronic medical records for all procedures/Xrays and details which were not copied into this note but were reviewed prior to creation of Plan. LABS: 
I reviewed today's most current labs and imaging studies. Pertinent labs include: No results for input(s): WBC, HGB, HCT, PLT, HGBEXT, HCTEXT, PLTEXT, HGBEXT, HCTEXT, PLTEXT in the last 72 hours. Recent Labs 08/31/18 
 8568  08/30/18 
 0340  08/29/18 
 9534 CREA  0.64*  0.60*  0.68* Signed: Leta Chin MD

## 2018-08-31 NOTE — PROGRESS NOTES
Problem: Falls - Risk of 
Goal: *Absence of Falls Document Paul Jaffe Fall Risk and appropriate interventions in the flowsheet. Outcome: Progressing Towards Goal 
Fall Risk Interventions: 
  
 
  
 
Medication Interventions: Teach patient to arise slowly History of Falls Interventions: Evaluate medications/consider consulting pharmacy

## 2018-08-31 NOTE — PROGRESS NOTES
1920) Bedside and Verbal shift change report given to YARELIS Ware (oncoming nurse) by Kimberly Martínez RN (offgoing nurse). Report included the following information SBAR, Kardex, Intake/Output, MAR, Accordion, Recent Results and Med Rec Status. 0730) Bedside and Verbal shift change report given to Michelle Cueva RN (oncoming nurse) by YARELIS Ware (offgoing nurse). Report included the following information SBAR, Kardex, Intake/Output, MAR, Accordion, Recent Results and Med Rec Status.

## 2018-09-01 LAB — CREAT SERPL-MCNC: 0.61 MG/DL (ref 0.7–1.3)

## 2018-09-01 PROCEDURE — 97602 WOUND(S) CARE NON-SELECTIVE: CPT

## 2018-09-01 PROCEDURE — 74011000258 HC RX REV CODE- 258: Performed by: HOSPITALIST

## 2018-09-01 PROCEDURE — 36415 COLL VENOUS BLD VENIPUNCTURE: CPT | Performed by: HOSPITALIST

## 2018-09-01 PROCEDURE — 82565 ASSAY OF CREATININE: CPT | Performed by: HOSPITALIST

## 2018-09-01 PROCEDURE — 74011250636 HC RX REV CODE- 250/636: Performed by: HOSPITALIST

## 2018-09-01 PROCEDURE — 74011250637 HC RX REV CODE- 250/637: Performed by: HOSPITALIST

## 2018-09-01 RX ADMIN — PIPERACILLIN SODIUM AND TAZOBACTAM SODIUM 3.38 G: 3; .375 INJECTION, POWDER, LYOPHILIZED, FOR SOLUTION INTRAVENOUS at 10:04

## 2018-09-01 RX ADMIN — VANCOMYCIN HYDROCHLORIDE 1000 MG: 1 INJECTION, POWDER, LYOPHILIZED, FOR SOLUTION INTRAVENOUS at 13:26

## 2018-09-01 RX ADMIN — VANCOMYCIN HYDROCHLORIDE 1000 MG: 1 INJECTION, POWDER, LYOPHILIZED, FOR SOLUTION INTRAVENOUS at 06:34

## 2018-09-01 RX ADMIN — Medication 20 ML: at 22:12

## 2018-09-01 RX ADMIN — ENOXAPARIN SODIUM 40 MG: 40 INJECTION, SOLUTION INTRAVENOUS; SUBCUTANEOUS at 13:26

## 2018-09-01 RX ADMIN — Medication 10 ML: at 16:16

## 2018-09-01 RX ADMIN — PIPERACILLIN SODIUM AND TAZOBACTAM SODIUM 3.38 G: 3; .375 INJECTION, POWDER, LYOPHILIZED, FOR SOLUTION INTRAVENOUS at 18:30

## 2018-09-01 RX ADMIN — Medication 10 ML: at 06:34

## 2018-09-01 RX ADMIN — PIPERACILLIN SODIUM AND TAZOBACTAM SODIUM 3.38 G: 3; .375 INJECTION, POWDER, LYOPHILIZED, FOR SOLUTION INTRAVENOUS at 02:39

## 2018-09-01 RX ADMIN — Medication 1 CAPSULE: at 10:03

## 2018-09-01 RX ADMIN — VANCOMYCIN HYDROCHLORIDE 1000 MG: 1 INJECTION, POWDER, LYOPHILIZED, FOR SOLUTION INTRAVENOUS at 22:13

## 2018-09-01 RX ADMIN — Medication 10 ML: at 14:00

## 2018-09-01 NOTE — PROGRESS NOTES
Bedside shift change report given to me (oncoming nurse) by Torsten Parra (offgoing nurse). Report included the following information SBAR, Kardex, Intake/Output, MAR, Accordion, Recent Results and Med Rec Status. 1500 Dressing change completed. Petroleum guaze with 4X4's applied and kerlex wrapped around the left stump. Then the stocking was applied. 1915 Bedside shift change report given to [address] (oncoming nurse) by me (offgoing nurse). Report included the following information SBAR, Kardex, Intake/Output, MAR, Accordion, Recent Results and Med Rec Status.

## 2018-09-01 NOTE — PROGRESS NOTES
Problem: Falls - Risk of 
Goal: *Absence of Falls Document Rick Garcia Fall Risk and appropriate interventions in the flowsheet. Outcome: Progressing Towards Goal 
Fall Risk Interventions: 
  
 
  
 
Medication Interventions: Teach patient to arise slowly History of Falls Interventions: Evaluate medications/consider consulting pharmacy

## 2018-09-02 LAB — CREAT SERPL-MCNC: 0.68 MG/DL (ref 0.7–1.3)

## 2018-09-02 PROCEDURE — 82565 ASSAY OF CREATININE: CPT | Performed by: HOSPITALIST

## 2018-09-02 PROCEDURE — 74011250637 HC RX REV CODE- 250/637: Performed by: EMERGENCY MEDICINE

## 2018-09-02 PROCEDURE — 36592 COLLECT BLOOD FROM PICC: CPT

## 2018-09-02 PROCEDURE — 97602 WOUND(S) CARE NON-SELECTIVE: CPT

## 2018-09-02 PROCEDURE — 74011000258 HC RX REV CODE- 258: Performed by: HOSPITALIST

## 2018-09-02 PROCEDURE — 74011250637 HC RX REV CODE- 250/637: Performed by: HOSPITALIST

## 2018-09-02 PROCEDURE — 36415 COLL VENOUS BLD VENIPUNCTURE: CPT | Performed by: HOSPITALIST

## 2018-09-02 PROCEDURE — 74011250636 HC RX REV CODE- 250/636: Performed by: HOSPITALIST

## 2018-09-02 RX ADMIN — PIPERACILLIN SODIUM AND TAZOBACTAM SODIUM 3.38 G: 3; .375 INJECTION, POWDER, LYOPHILIZED, FOR SOLUTION INTRAVENOUS at 01:55

## 2018-09-02 RX ADMIN — VANCOMYCIN HYDROCHLORIDE 1000 MG: 1 INJECTION, POWDER, LYOPHILIZED, FOR SOLUTION INTRAVENOUS at 06:00

## 2018-09-02 RX ADMIN — VANCOMYCIN HYDROCHLORIDE 1000 MG: 1 INJECTION, POWDER, LYOPHILIZED, FOR SOLUTION INTRAVENOUS at 14:31

## 2018-09-02 RX ADMIN — PIPERACILLIN SODIUM AND TAZOBACTAM SODIUM 3.38 G: 3; .375 INJECTION, POWDER, LYOPHILIZED, FOR SOLUTION INTRAVENOUS at 18:29

## 2018-09-02 RX ADMIN — HYDROCODONE BITARTRATE AND ACETAMINOPHEN 1 TABLET: 5; 325 TABLET ORAL at 23:08

## 2018-09-02 RX ADMIN — HYDROCODONE BITARTRATE AND ACETAMINOPHEN 1 TABLET: 5; 325 TABLET ORAL at 18:30

## 2018-09-02 RX ADMIN — VANCOMYCIN HYDROCHLORIDE 1000 MG: 1 INJECTION, POWDER, LYOPHILIZED, FOR SOLUTION INTRAVENOUS at 21:39

## 2018-09-02 RX ADMIN — Medication 20 ML: at 21:39

## 2018-09-02 RX ADMIN — Medication 10 ML: at 14:00

## 2018-09-02 RX ADMIN — ENOXAPARIN SODIUM 40 MG: 40 INJECTION, SOLUTION INTRAVENOUS; SUBCUTANEOUS at 14:31

## 2018-09-02 RX ADMIN — Medication 1 CAPSULE: at 09:52

## 2018-09-02 RX ADMIN — Medication 20 ML: at 06:00

## 2018-09-02 RX ADMIN — PIPERACILLIN SODIUM AND TAZOBACTAM SODIUM 3.38 G: 3; .375 INJECTION, POWDER, LYOPHILIZED, FOR SOLUTION INTRAVENOUS at 09:52

## 2018-09-02 RX ADMIN — HYDROCODONE BITARTRATE AND ACETAMINOPHEN 1 TABLET: 5; 325 TABLET ORAL at 02:23

## 2018-09-02 NOTE — PROGRESS NOTES
Bedside shift change report given to Gayatri Casillas 1154 (oncoming nurse) by Gadiel Caldwell (offgoing nurse). Report included the following information SBAR, Kardex, Intake/Output, MAR, Accordion and Recent Results.

## 2018-09-03 LAB — CREAT SERPL-MCNC: 0.58 MG/DL (ref 0.7–1.3)

## 2018-09-03 PROCEDURE — 36592 COLLECT BLOOD FROM PICC: CPT

## 2018-09-03 PROCEDURE — 74011250637 HC RX REV CODE- 250/637: Performed by: EMERGENCY MEDICINE

## 2018-09-03 PROCEDURE — 36415 COLL VENOUS BLD VENIPUNCTURE: CPT | Performed by: HOSPITALIST

## 2018-09-03 PROCEDURE — 82565 ASSAY OF CREATININE: CPT | Performed by: HOSPITALIST

## 2018-09-03 PROCEDURE — 74011250637 HC RX REV CODE- 250/637: Performed by: HOSPITALIST

## 2018-09-03 PROCEDURE — 74011250636 HC RX REV CODE- 250/636: Performed by: HOSPITALIST

## 2018-09-03 PROCEDURE — 74011000258 HC RX REV CODE- 258: Performed by: HOSPITALIST

## 2018-09-03 RX ADMIN — PIPERACILLIN SODIUM AND TAZOBACTAM SODIUM 3.38 G: 3; .375 INJECTION, POWDER, LYOPHILIZED, FOR SOLUTION INTRAVENOUS at 02:15

## 2018-09-03 RX ADMIN — HYDROCODONE BITARTRATE AND ACETAMINOPHEN 1 TABLET: 5; 325 TABLET ORAL at 14:04

## 2018-09-03 RX ADMIN — Medication 10 ML: at 14:00

## 2018-09-03 RX ADMIN — Medication 20 ML: at 21:49

## 2018-09-03 RX ADMIN — PIPERACILLIN SODIUM AND TAZOBACTAM SODIUM 3.38 G: 3; .375 INJECTION, POWDER, LYOPHILIZED, FOR SOLUTION INTRAVENOUS at 09:17

## 2018-09-03 RX ADMIN — HYDROCODONE BITARTRATE AND ACETAMINOPHEN 1 TABLET: 5; 325 TABLET ORAL at 19:47

## 2018-09-03 RX ADMIN — ENOXAPARIN SODIUM 40 MG: 40 INJECTION, SOLUTION INTRAVENOUS; SUBCUTANEOUS at 13:58

## 2018-09-03 RX ADMIN — VANCOMYCIN HYDROCHLORIDE 1000 MG: 1 INJECTION, POWDER, LYOPHILIZED, FOR SOLUTION INTRAVENOUS at 21:49

## 2018-09-03 RX ADMIN — PIPERACILLIN SODIUM AND TAZOBACTAM SODIUM 3.38 G: 3; .375 INJECTION, POWDER, LYOPHILIZED, FOR SOLUTION INTRAVENOUS at 18:06

## 2018-09-03 RX ADMIN — Medication 20 ML: at 06:23

## 2018-09-03 RX ADMIN — VANCOMYCIN HYDROCHLORIDE 1000 MG: 1 INJECTION, POWDER, LYOPHILIZED, FOR SOLUTION INTRAVENOUS at 06:23

## 2018-09-03 RX ADMIN — VANCOMYCIN HYDROCHLORIDE 1000 MG: 1 INJECTION, POWDER, LYOPHILIZED, FOR SOLUTION INTRAVENOUS at 13:58

## 2018-09-03 RX ADMIN — Medication 1 CAPSULE: at 09:17

## 2018-09-03 NOTE — PROGRESS NOTES
Problem: Falls - Risk of 
Goal: *Absence of Falls Document Naomi Arenas Fall Risk and appropriate interventions in the flowsheet. Outcome: Progressing Towards Goal 
Fall Risk Interventions: 
  
 
  
 
Medication Interventions: Teach patient to arise slowly History of Falls Interventions: Evaluate medications/consider consulting pharmacy

## 2018-09-03 NOTE — PROGRESS NOTES
Bedside shift change report given to me (oncoming nurse) by Lane Michael RN (offgoing nurse). Report included the following information SBAR, Kardex, Intake/Output, MAR, Accordion, Recent Results and Med Rec Status. 1100 Dressing changed per order. Patient tolerated well. 1911 Bedside shift change report given to Lane Michael RN (oncoming nurse) by me (offgoing nurse). Report included the following information SBAR, Kardex, Intake/Output, MAR, Accordion, Recent Results and Med Rec Status.

## 2018-09-03 NOTE — PROGRESS NOTES
Hospitalist Progress Note NAME: Johanna Rodriges :  1970 MRN:  [id number] Assessment / Plan: 
  
Humerus steomyelitis (2018): POA 
- long term IV vancomycin and zosyn through 10/1/2018 
- ID Dr Mina Olivo will follow along - pain controlled with oral meds 
  
Code Status: full Surrogate Decision Maker: Brenden Mondragon, sujatasin 
  
DVT Prophylaxis: lovenox GI Prophylaxis: not indicated 
  
Baseline: independent Subjective: Chief Complaint / Reason for Physician Visit Pt speaks minimal english obtained information directly from patient Discussed with RN events overnight. Review of Systems: 
Symptom Y/N Comments  Symptom Y/N Comments Fever/Chills    Chest Pain Poor Appetite    Edema Cough    Abdominal Pain Sputum    Joint Pain y   
SOB/AIKEN    Pruritis/Rash Nausea/vomit    Tolerating PT/OT y Diarrhea n   Tolerating Diet y Constipation    Other Could NOT obtain due to:   
 
Objective: VITALS:  
Last 24hrs VS reviewed since prior progress note. Most recent are: 
Patient Vitals for the past 24 hrs: 
 Temp Pulse Resp BP SpO2  
18 0358 97.6 °F (36.4 °C) 66 16 135/54 100 % 18 1930 98.3 °F (36.8 °C) 83 16 116/70 100 % 18 1600 96.4 °F (35.8 °C) 99 16 109/81 97 % Intake/Output Summary (Last 24 hours) at 18 1143 Last data filed at 18 0215 Gross per 24 hour Intake              710 ml Output                0 ml Net              710 ml PHYSICAL EXAM: 
General: no acute distress   
Neurologic:  Alert and oriented X 3, normal speech Psych:   Not anxious nor agitated Ext:  Left arm stump in dressing Reviewed most current lab test results and cultures  YES Reviewed most current radiology test results   YES Review and summation of old records today    NO Reviewed patient's current orders and MAR    YES 
PMH/SH reviewed - no change compared to H&P 
 ________________________________________________________________________ Care Plan discussed with: 
  Comments Patient x Family RN x Care Manager x Consultant Multidiciplinary team rounds were held today with , nursing, pharmacist and clinical coordinator. Patient's plan of care was discussed; medications were reviewed and discharge planning was addressed. ________________________________________________________________________ Total NON critical care TIME:  15  Minutes Total CRITICAL CARE TIME Spent:   Minutes non procedure based Comments >50% of visit spent in counseling and coordination of care    
________________________________________________________________________ Matt Ca MD  
 
Procedures: see electronic medical records for all procedures/Xrays and details which were not copied into this note but were reviewed prior to creation of Plan. LABS: 
I reviewed today's most current labs and imaging studies. Pertinent labs include: No results for input(s): WBC, HGB, HCT, PLT, HGBEXT, HCTEXT, PLTEXT, HGBEXT, HCTEXT, PLTEXT in the last 72 hours. Recent Labs  
   09/03/18 
 0406  09/02/18 
 0440  09/01/18 
 0235 CREA  0.58*  0.68*  0.61* Signed: Matt Ca MD

## 2018-09-03 NOTE — PROGRESS NOTES
Hospitalist Progress Note NAME: Curtis Liu :  1970 MRN:  [id number] Assessment / Plan: 
  
Humerus steomyelitis (2018): POA 
- long term IV vancomycin and zosyn through 10/1/2018 
- ID Dr Aleyda Oconnor will follow along - pain controlled with oral meds 
  
Code Status: full Surrogate Decision Maker: Brenden Mondragon, cousin 
  
DVT Prophylaxis: lovenox GI Prophylaxis: not indicated 
  
Baseline: independent Subjective: Chief Complaint / Reason for Physician Visit Pt speaks minimal english obtained information directly from patient \"ok\". Discussed with RN events overnight. Review of Systems: 
Symptom Y/N Comments  Symptom Y/N Comments Fever/Chills    Chest Pain Poor Appetite n   Edema Cough    Abdominal Pain n   
Sputum    Joint Pain y   
SOB/AIKEN n   Pruritis/Rash Nausea/vomit n   Tolerating PT/OT y Diarrhea n   Tolerating Diet y Constipation    Other Could NOT obtain due to:   
 
Objective: VITALS:  
Last 24hrs VS reviewed since prior progress note. Most recent are: 
Patient Vitals for the past 24 hrs: 
 Temp Pulse Resp BP SpO2  
18 0358 97.6 °F (36.4 °C) 66 16 135/54 100 % 18 1930 98.3 °F (36.8 °C) 83 16 116/70 100 % 18 1600 96.4 °F (35.8 °C) 99 16 109/81 97 % Intake/Output Summary (Last 24 hours) at 18 1143 Last data filed at 18 0215 Gross per 24 hour Intake              710 ml Output                0 ml Net              710 ml PHYSICAL EXAM: 
General: no acute distress   
Neurologic:  Alert and oriented X 3, normal speech Psych:   Not anxious nor agitated Ext:  Left arm stump in dressing Reviewed most current lab test results and cultures  YES Reviewed most current radiology test results   YES Review and summation of old records today    NO Reviewed patient's current orders and MAR    YES 
PMH/SH reviewed - no change compared to H&P 
 ________________________________________________________________________ Care Plan discussed with: 
  Comments Patient x Family RN x Care Manager x Consultant Multidiciplinary team rounds were held today with , nursing, pharmacist and clinical coordinator. Patient's plan of care was discussed; medications were reviewed and discharge planning was addressed. ________________________________________________________________________ Total NON critical care TIME:  20  Minutes Total CRITICAL CARE TIME Spent:   Minutes non procedure based Comments >50% of visit spent in counseling and coordination of care    
________________________________________________________________________ Scottie White MD  
 
Procedures: see electronic medical records for all procedures/Xrays and details which were not copied into this note but were reviewed prior to creation of Plan. LABS: 
I reviewed today's most current labs and imaging studies. Pertinent labs include: No results for input(s): WBC, HGB, HCT, PLT, HGBEXT, HCTEXT, PLTEXT, HGBEXT, HCTEXT, PLTEXT in the last 72 hours. Recent Labs  
   09/03/18 
 0406  09/02/18 
 0440  09/01/18 
 0235 CREA  0.58*  0.68*  0.61* Signed: Scottie White MD

## 2018-09-03 NOTE — PROGRESS NOTES
Bedside shift change report given to Gayatri Casillas 1154 (oncoming nurse) by Elia Kenny (offgoing nurse). Report included the following information SBAR, Kardex, Intake/Output, MAR, Accordion and Recent Results.

## 2018-09-04 LAB — CREAT SERPL-MCNC: 0.65 MG/DL (ref 0.7–1.3)

## 2018-09-04 PROCEDURE — 74011250636 HC RX REV CODE- 250/636: Performed by: HOSPITALIST

## 2018-09-04 PROCEDURE — 36415 COLL VENOUS BLD VENIPUNCTURE: CPT | Performed by: HOSPITALIST

## 2018-09-04 PROCEDURE — 36592 COLLECT BLOOD FROM PICC: CPT

## 2018-09-04 PROCEDURE — 77030011253 HC DRSG HYDRGEL MDII -B

## 2018-09-04 PROCEDURE — 97602 WOUND(S) CARE NON-SELECTIVE: CPT

## 2018-09-04 PROCEDURE — 82565 ASSAY OF CREATININE: CPT | Performed by: HOSPITALIST

## 2018-09-04 PROCEDURE — 74011250637 HC RX REV CODE- 250/637: Performed by: HOSPITALIST

## 2018-09-04 PROCEDURE — 74011000258 HC RX REV CODE- 258: Performed by: HOSPITALIST

## 2018-09-04 PROCEDURE — 74011250637 HC RX REV CODE- 250/637: Performed by: EMERGENCY MEDICINE

## 2018-09-04 RX ADMIN — ENOXAPARIN SODIUM 40 MG: 40 INJECTION, SOLUTION INTRAVENOUS; SUBCUTANEOUS at 14:03

## 2018-09-04 RX ADMIN — VANCOMYCIN HYDROCHLORIDE 1000 MG: 1 INJECTION, POWDER, LYOPHILIZED, FOR SOLUTION INTRAVENOUS at 22:12

## 2018-09-04 RX ADMIN — Medication 1 CAPSULE: at 09:32

## 2018-09-04 RX ADMIN — VANCOMYCIN HYDROCHLORIDE 1000 MG: 1 INJECTION, POWDER, LYOPHILIZED, FOR SOLUTION INTRAVENOUS at 06:42

## 2018-09-04 RX ADMIN — PIPERACILLIN SODIUM AND TAZOBACTAM SODIUM 3.38 G: 3; .375 INJECTION, POWDER, LYOPHILIZED, FOR SOLUTION INTRAVENOUS at 02:49

## 2018-09-04 RX ADMIN — Medication 10 ML: at 14:04

## 2018-09-04 RX ADMIN — PIPERACILLIN SODIUM AND TAZOBACTAM SODIUM 3.38 G: 3; .375 INJECTION, POWDER, LYOPHILIZED, FOR SOLUTION INTRAVENOUS at 10:22

## 2018-09-04 RX ADMIN — VANCOMYCIN HYDROCHLORIDE 1000 MG: 1 INJECTION, POWDER, LYOPHILIZED, FOR SOLUTION INTRAVENOUS at 14:03

## 2018-09-04 RX ADMIN — HYDROCODONE BITARTRATE AND ACETAMINOPHEN 1 TABLET: 5; 325 TABLET ORAL at 14:11

## 2018-09-04 RX ADMIN — PIPERACILLIN SODIUM AND TAZOBACTAM SODIUM 3.38 G: 3; .375 INJECTION, POWDER, LYOPHILIZED, FOR SOLUTION INTRAVENOUS at 18:22

## 2018-09-04 RX ADMIN — Medication 10 ML: at 22:18

## 2018-09-04 RX ADMIN — Medication 20 ML: at 06:42

## 2018-09-04 NOTE — PROGRESS NOTES
Problem: Falls - Risk of 
Goal: *Absence of Falls Document Manuel Maldonado Fall Risk and appropriate interventions in the flowsheet. Outcome: Progressing Towards Goal 
Fall Risk Interventions: 
  
 
  
 
Medication Interventions: Teach patient to arise slowly History of Falls Interventions: Evaluate medications/consider consulting pharmacy

## 2018-09-04 NOTE — PROGRESS NOTES
0710hrs . Yeimi Ap Bedside and Verbal shift change report given to Lilia Dunham (oncoming nurse) by Teodoro Orourke (offgoing nurse). Report included the following information SBAR, Kardex, Intake/Output, MAR, Recent Results and Med Rec Status. 1910hrs . Yeimi Ap Bedside and Verbal shift change report given to So (oncoming nurse) by Lilia Dunham (offgoing nurse). Report included the following information SBAR, Kardex, Intake/Output, MAR, Recent Results and Med Rec Status.

## 2018-09-04 NOTE — PROGRESS NOTES
Hospitalist Progress Note NAME: Reba Cobos :  1970 MRN:  [id number] Assessment / Plan: 
 
Long term patient transferred from [facility] for completion of antibiotics Update- 
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 
long term IV vancomycin and zosyn through 10/1/2018 ID Dr Dio Zamorano will follow along 
 pain controlled with oral meds 
  
Code Status: full Surrogate Decision Maker: Brenden Mondragon, sujatasin 
  
DVT Prophylaxis: lovenox GI Prophylaxis: not indicated 
  
Baseline: independent Subjective: Chief Complaint / Reason for Physician Visit Pt speaks minimal english obtained information directly from patient \"ok\". Discussed with RN events overnight. Review of Systems: 
Symptom Y/N Comments  Symptom Y/N Comments Fever/Chills    Chest Pain n   
Poor Appetite n   Edema Cough    Abdominal Pain n   
Sputum    Joint Pain y   
SOB/AIKEN n   Pruritis/Rash Nausea/vomit n   Tolerating PT/OT y Diarrhea n   Tolerating Diet y Constipation    Other Could NOT obtain due to:   
 
Objective: VITALS:  
Last 24hrs VS reviewed since prior progress note. Most recent are: 
Patient Vitals for the past 24 hrs: 
 Temp Pulse Resp BP SpO2  
18 0756 98 °F (36.7 °C) 67 20 137/71 100 % 18 0429 98 °F (36.7 °C) 69 16 148/85 99 % 18 1939 98.1 °F (36.7 °C) 88 18 129/90 100 % 18 1624 98.2 °F (36.8 °C) 62 18 107/68 99 % Intake/Output Summary (Last 24 hours) at 18 1241 Last data filed at 18 6359 Gross per 24 hour Intake              470 ml Output                0 ml Net              470 ml PHYSICAL EXAM: 
General: Alert, cooperative, no acute distress   
EENT:  EOMI. Anicteric sclerae. MMM Resp:  CTA bilaterally, no wheezing or rales. No accessory muscle use CV:  Regular  rhythm,  No edema GI:  Soft, Non distended, Non tender.  +Bowel sounds Neurologic:  Alert and oriented X 3, normal speech,  
 Psych:   Good insight. Not anxious nor agitated Ext:  Left arm stump in dressing Reviewed most current lab test results and cultures  YES Reviewed most current radiology test results   YES Review and summation of old records today    NO Reviewed patient's current orders and MAR    YES 
PMH/SH reviewed - no change compared to H&P 
________________________________________________________________________ Care Plan discussed with: 
  Comments Patient x Family RN x Care Manager x Consultant Multidiciplinary team rounds were held today with , nursing, pharmacist and clinical coordinator. Patient's plan of care was discussed; medications were reviewed and discharge planning was addressed. ________________________________________________________________________ Total NON critical care TIME:  30  Minutes Total CRITICAL CARE TIME Spent:   Minutes non procedure based Comments >50% of visit spent in counseling and coordination of care    
________________________________________________________________________ Hilda Nelson MD  
 
Procedures: see electronic medical records for all procedures/Xrays and details which were not copied into this note but were reviewed prior to creation of Plan. LABS: 
I reviewed today's most current labs and imaging studies. Pertinent labs include: No results for input(s): WBC, HGB, HCT, PLT, HGBEXT, HCTEXT, PLTEXT, HGBEXT, HCTEXT, PLTEXT in the last 72 hours. Recent Labs  
   09/04/18 
 0440  09/03/18 
 0406  09/02/18 
 0440 CREA  0.65*  0.58*  0.68* Signed: Hilda Nelson MD

## 2018-09-04 NOTE — PROGRESS NOTES
Problem: Falls - Risk of 
Goal: *Absence of Falls Document Cl[place] Fall Risk and appropriate interventions in the flowsheet. Outcome: Progressing Towards Goal 
Fall Risk Interventions: 
  
 
  
 
Medication Interventions: Teach patient to arise slowly History of Falls Interventions: Evaluate medications/consider consulting pharmacy

## 2018-09-05 LAB
CREAT SERPL-MCNC: 0.67 MG/DL (ref 0.7–1.3)
VANCOMYCIN SERPL-MCNC: 19.8 UG/ML

## 2018-09-05 PROCEDURE — 74011250636 HC RX REV CODE- 250/636: Performed by: HOSPITALIST

## 2018-09-05 PROCEDURE — 80202 ASSAY OF VANCOMYCIN: CPT | Performed by: HOSPITALIST

## 2018-09-05 PROCEDURE — 36415 COLL VENOUS BLD VENIPUNCTURE: CPT | Performed by: HOSPITALIST

## 2018-09-05 PROCEDURE — 74011250637 HC RX REV CODE- 250/637: Performed by: EMERGENCY MEDICINE

## 2018-09-05 PROCEDURE — 82565 ASSAY OF CREATININE: CPT | Performed by: HOSPITALIST

## 2018-09-05 PROCEDURE — 74011250637 HC RX REV CODE- 250/637: Performed by: HOSPITALIST

## 2018-09-05 PROCEDURE — 97602 WOUND(S) CARE NON-SELECTIVE: CPT

## 2018-09-05 PROCEDURE — 74011000258 HC RX REV CODE- 258: Performed by: HOSPITALIST

## 2018-09-05 PROCEDURE — C1751 CATH, INF, PER/CENT/MIDLINE: HCPCS

## 2018-09-05 RX ORDER — SODIUM CHLORIDE 0.9 % (FLUSH) 0.9 %
10 SYRINGE (ML) INJECTION EVERY 8 HOURS
Status: DISCONTINUED | OUTPATIENT
Start: 2018-09-05 | End: 2018-10-02 | Stop reason: HOSPADM

## 2018-09-05 RX ADMIN — PIPERACILLIN SODIUM AND TAZOBACTAM SODIUM 3.38 G: 3; .375 INJECTION, POWDER, LYOPHILIZED, FOR SOLUTION INTRAVENOUS at 10:52

## 2018-09-05 RX ADMIN — Medication 10 ML: at 05:22

## 2018-09-05 RX ADMIN — SODIUM CHLORIDE 3.38 G: 900 INJECTION, SOLUTION INTRAVENOUS at 18:46

## 2018-09-05 RX ADMIN — HYDROCODONE BITARTRATE AND ACETAMINOPHEN 1 TABLET: 5; 325 TABLET ORAL at 22:07

## 2018-09-05 RX ADMIN — Medication 10 ML: at 22:07

## 2018-09-05 RX ADMIN — Medication 10 ML: at 08:20

## 2018-09-05 RX ADMIN — HEPARIN 300 UNITS: 100 SYRINGE at 08:20

## 2018-09-05 RX ADMIN — PIPERACILLIN SODIUM AND TAZOBACTAM SODIUM 3.38 G: 3; .375 INJECTION, POWDER, LYOPHILIZED, FOR SOLUTION INTRAVENOUS at 01:15

## 2018-09-05 RX ADMIN — Medication 10 ML: at 10:51

## 2018-09-05 RX ADMIN — Medication 10 ML: at 15:05

## 2018-09-05 RX ADMIN — Medication 10 ML: at 18:49

## 2018-09-05 RX ADMIN — ENOXAPARIN SODIUM 40 MG: 40 INJECTION, SOLUTION INTRAVENOUS; SUBCUTANEOUS at 15:38

## 2018-09-05 RX ADMIN — Medication 10 ML: at 15:40

## 2018-09-05 RX ADMIN — Medication 1 CAPSULE: at 08:22

## 2018-09-05 RX ADMIN — VANCOMYCIN HYDROCHLORIDE 1250 MG: 1 INJECTION, POWDER, LYOPHILIZED, FOR SOLUTION INTRAVENOUS at 15:40

## 2018-09-05 RX ADMIN — VANCOMYCIN HYDROCHLORIDE 1000 MG: 1 INJECTION, POWDER, LYOPHILIZED, FOR SOLUTION INTRAVENOUS at 05:17

## 2018-09-05 RX ADMIN — HYDROCODONE BITARTRATE AND ACETAMINOPHEN 1 TABLET: 5; 325 TABLET ORAL at 14:45

## 2018-09-05 NOTE — PROGRESS NOTES
0710) Bedside shift change report given to Yoly Poole RN (oncoming nurse) by Spencer Ho RN (offgoing nurse). Report included the following information SBAR, Kardex, MAR, Accordion and Recent Results. 2957) Blood return from red port but not the purple port of PICC line [phone] to discuss bowel movement yesterday afternoon, heparin in port, and plan for pain medication before wound care 0562) Heparin locked purple port of PICC line. 1052) pt request shower and wound care at 2 pm. No blood return from purple port. Use red line for zosyn. 1500) Pt return from shower. Central line dressing damp. New dressing and end caps applied. Blood return from both portsl. 1915) Bedside shift change report given to YARELIS Alexandra (oncoming nurse) by Yoly Poole RN (offgoing nurse). Report included the following information SBAR, Kardex, MAR, Accordion and Recent Results.

## 2018-09-05 NOTE — PROGRESS NOTES
Bedside shift change report given to me (oncoming nurse) by Cristobal Jones (offgoing nurse). Report included the following information SBAR, Kardex, Intake/Output, MAR and Recent Results.

## 2018-09-05 NOTE — PROGRESS NOTES
[address] [address] Pharmacy Dosing Services: Antimicrobial Stewardship Daily Doc Consult for antibiotic dosing of vancomycin by Dr. Juanita Long (continuing from [facility]) Indication: osteomyelitis (confirmed on MRI) Day of Therapy 16 (until 10/1/18) Ht Readings from Last 1 Encounters:  
08/30/18 162.6 cm (64\") Wt Readings from Last 1 Encounters:  
08/30/18 64.5 kg (142 lb 3.2 oz) Vancomycin therapy: 
Current maintenance dose: 1000 (mg) every 8 hours. Dose calculated to approximate a therapeutic trough of 15-20 mcg/mL. Last random level 19.8 mcg/ml at 0334 on 9/5. Approximately 5.25 hours post dose; extrapolates to 13.8 mcg/mL. Plan for level / Adjustment in Therapy: SCr stable, afebrile, increase dose to 1250 mg IV Q8H for expected trough of 17.3 mcg/mL. Dose administration notes:   Doses given appropriately as scheduled Other Antimicrobial  
(not dosed by pharmacist) Zosyn 3.375G IV Q8H Cultures 8/16 wound: Pseudomonas (susc pip/tazo) @ Final 
8/16 blood: Negative @ Final 
8/16 wound: Pseudomonas (susc pip/tazo) @ Final  
Serum Creatinine Lab Results Component Value Date/Time Creatinine 0.67 (L) 09/05/2018 03:34 AM  
  
  
Creatinine Clearance Estimated Creatinine Clearance: 110.4 mL/min (based on Cr of 0.67). Temp Temp: 96.4 °F (35.8 °C) WBC Lab Results Component Value Date/Time WBC 3.9 (L) 08/25/2018 03:22 AM  
 
  
H/H Lab Results Component Value Date/Time HGB 11.5 (L) 08/25/2018 03:22 AM  
 
  
Platelets Lab Results Component Value Date/Time PLATELET 724 75/89/2295 03:22 AM  
 
  
 
 
Pharmacist Beatriz Lynne, PHARMD, BCPS Contact: [phone]

## 2018-09-05 NOTE — PROGRESS NOTES
Spiritual Care Assessment/Progress Note [facility] [place] 
 
 
NAME: Kristina Manjarrez      MRN: [id number] AGE: 46 y.o. SEX: male Islam Affiliation: Baptism Language: Citizen of [place] 9/5/2018     Total Time (in minutes): 5 Spiritual Assessment begun in [address] through conversation with: 
  
    [x]Patient        [] Family    [] Friend(s) Reason for Consult: Initial/Spiritual assessment, critical care Spiritual beliefs: (Please include comment if needed) 
   [] Identifies with a shira tradition:     
   [] Supported by a shira community:        
   [] Claims no spiritual orientation:       
   [] Seeking spiritual identity:            
   [] Adheres to an individual form of spirituality:       
   [x] Not able to assess:                   
 
    
Identified resources for coping:  
   [] Prayer                           
   [] Music                  [] Guided Imagery 
   [] Family/friends                 [] Pet visits [] Devotional reading                         [x] Unknown 
   [] Other:                                        
 
 
Interventions offered during this visit: (See comments for more details) Patient Interventions: Initial/Spiritual assessment, patient floor, Iconic (affirming the presence of God/Higher Power) Plan of Care: 
 
 [] Support spiritual and/or cultural needs  
 [] Support AMD and/or advance care planning process    
 [] Support grieving process 
 [] Coordinate Rites and/or Rituals  
 [] Coordination with community clergy 
 [x] No spiritual needs identified at this time 
 [] Detailed Plan of Care below (See Comments)  [] Make referral to Music Therapy 
[] Make referral to Pet Therapy    
[] Make referral to Addiction services 
[] Make referral to [facility] 
[] Make referral to Spiritual Care Partner 
[] No future visits requested       
[x] Follow up visits as needed Comments: Initial visit with patient on Med Surg due to extended hospitalization. Introduced self and role of chaplains in the hospital. Patient stated that he is doing okay with his extended stay and has no specific needs at this time. Advised of  availability and assured of ongoing support as needed and desired. Chaplain Pepe Macedo M.Div.  Paging Service [phone])

## 2018-09-05 NOTE — PROGRESS NOTES
Problem: Falls - Risk of 
Goal: *Absence of Falls Document Fabien Kins Fall Risk and appropriate interventions in the flowsheet. Outcome: Progressing Towards Goal 
Fall Risk Interventions: 
  
 
  
 
Medication Interventions: Teach patient to arise slowly History of Falls Interventions: Evaluate medications/consider consulting pharmacy

## 2018-09-05 NOTE — PROGRESS NOTES
Hospitalist Progress Note NAME: Jennie Adams :  1970 MRN:  [id number] Assessment / Plan: 
 
Long term patient transferred from [facility] for completion of antibiotics Update- 
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 
long term IV vancomycin and zosyn through 10/1/2018 ID Dr Yobany Burger will follow along 
 pain controlled with oral meds 
  
Code Status: full Surrogate Decision Maker: Brenden Mondragon cousin 
  
DVT Prophylaxis: lovenox GI Prophylaxis: not indicated 
  
Baseline: independent Subjective: Chief Complaint / Reason for Physician Visit Pt speaks minimal english obtained information directly from patient \"ok\". Discussed with RN events overnight. Review of Systems: 
Symptom Y/N Comments  Symptom Y/N Comments Fever/Chills    Chest Pain n   
Poor Appetite n   Edema Cough    Abdominal Pain n   
Sputum    Joint Pain y   
SOB/AIKEN n   Pruritis/Rash Nausea/vomit n   Tolerating PT/OT y Diarrhea n   Tolerating Diet y Constipation    Other Could NOT obtain due to:   
 
Objective: VITALS:  
Last 24hrs VS reviewed since prior progress note. Most recent are: 
Patient Vitals for the past 24 hrs: 
 Temp Pulse Resp BP SpO2  
18 0738 96.4 °F (35.8 °C) 69 20 134/82 99 % 18 0058 97 °F (36.1 °C) 78 18 148/68 100 % 18 1614 98.2 °F (36.8 °C) 90 16 155/82 98 % Intake/Output Summary (Last 24 hours) at 18 1025 Last data filed at 18 9148 Gross per 24 hour Intake                0 ml Output                0 ml Net                0 ml PHYSICAL EXAM: 
General: Alert, cooperative, no acute distress   
EENT:  EOMI. Anicteric sclerae. MMM Resp:  CTA bilaterally, no wheezing or rales. No accessory muscle use CV:  Regular  rhythm,  No edema GI:  Soft, Non distended, Non tender.  +Bowel sounds Neurologic:  Alert and oriented X 3, normal speech, Psych:   Good insight. Not anxious nor agitated Ext:  Left arm stump in dressing Reviewed most current lab test results and cultures  YES Reviewed most current radiology test results   YES Review and summation of old records today    NO Reviewed patient's current orders and MAR    YES 
PMH/SH reviewed - no change compared to H&P 
________________________________________________________________________ Care Plan discussed with: 
  Comments Patient x Family RN x Care Manager x Consultant Multidiciplinary team rounds were held today with , nursing, pharmacist and clinical coordinator. Patient's plan of care was discussed; medications were reviewed and discharge planning was addressed. ________________________________________________________________________ Total NON critical care TIME:  30  Minutes Total CRITICAL CARE TIME Spent:   Minutes non procedure based Comments >50% of visit spent in counseling and coordination of care    
________________________________________________________________________ Sayda Yoder MD  
 
Procedures: see electronic medical records for all procedures/Xrays and details which were not copied into this note but were reviewed prior to creation of Plan. LABS: 
I reviewed today's most current labs and imaging studies. Pertinent labs include: No results for input(s): WBC, HGB, HCT, PLT, HGBEXT, HCTEXT, PLTEXT, HGBEXT, HCTEXT, PLTEXT in the last 72 hours. Recent Labs  
   09/05/18 
 0334  09/04/18 
 0440  09/03/18 
 0406 CREA  0.67*  0.65*  0.58* Signed: Sayda Yoder MD

## 2018-09-06 LAB — CREAT SERPL-MCNC: 0.62 MG/DL (ref 0.7–1.3)

## 2018-09-06 PROCEDURE — 74011250637 HC RX REV CODE- 250/637: Performed by: EMERGENCY MEDICINE

## 2018-09-06 PROCEDURE — 97602 WOUND(S) CARE NON-SELECTIVE: CPT

## 2018-09-06 PROCEDURE — 74011250636 HC RX REV CODE- 250/636: Performed by: HOSPITALIST

## 2018-09-06 PROCEDURE — 36415 COLL VENOUS BLD VENIPUNCTURE: CPT | Performed by: HOSPITALIST

## 2018-09-06 PROCEDURE — 74011000258 HC RX REV CODE- 258: Performed by: HOSPITALIST

## 2018-09-06 PROCEDURE — 74011250637 HC RX REV CODE- 250/637: Performed by: HOSPITALIST

## 2018-09-06 PROCEDURE — 82565 ASSAY OF CREATININE: CPT | Performed by: HOSPITALIST

## 2018-09-06 RX ADMIN — VANCOMYCIN HYDROCHLORIDE 1250 MG: 1 INJECTION, POWDER, LYOPHILIZED, FOR SOLUTION INTRAVENOUS at 16:05

## 2018-09-06 RX ADMIN — Medication 1 CAPSULE: at 09:47

## 2018-09-06 RX ADMIN — HYDROCODONE BITARTRATE AND ACETAMINOPHEN 1 TABLET: 5; 325 TABLET ORAL at 15:44

## 2018-09-06 RX ADMIN — Medication 10 ML: at 16:05

## 2018-09-06 RX ADMIN — VANCOMYCIN HYDROCHLORIDE 1250 MG: 1 INJECTION, POWDER, LYOPHILIZED, FOR SOLUTION INTRAVENOUS at 00:10

## 2018-09-06 RX ADMIN — Medication 10 ML: at 06:11

## 2018-09-06 RX ADMIN — SODIUM CHLORIDE 3.38 G: 900 INJECTION, SOLUTION INTRAVENOUS at 10:48

## 2018-09-06 RX ADMIN — Medication 10 ML: at 22:14

## 2018-09-06 RX ADMIN — SODIUM CHLORIDE 3.38 G: 900 INJECTION, SOLUTION INTRAVENOUS at 02:03

## 2018-09-06 RX ADMIN — Medication 10 ML: at 21:30

## 2018-09-06 RX ADMIN — SODIUM CHLORIDE 3.38 G: 900 INJECTION, SOLUTION INTRAVENOUS at 18:29

## 2018-09-06 RX ADMIN — ENOXAPARIN SODIUM 40 MG: 40 INJECTION, SOLUTION INTRAVENOUS; SUBCUTANEOUS at 15:43

## 2018-09-06 RX ADMIN — HYDROCODONE BITARTRATE AND ACETAMINOPHEN 1 TABLET: 5; 325 TABLET ORAL at 09:47

## 2018-09-06 RX ADMIN — Medication 10 ML: at 23:43

## 2018-09-06 RX ADMIN — VANCOMYCIN HYDROCHLORIDE 1250 MG: 1 INJECTION, POWDER, LYOPHILIZED, FOR SOLUTION INTRAVENOUS at 23:38

## 2018-09-06 RX ADMIN — Medication 10 ML: at 18:30

## 2018-09-06 NOTE — PROGRESS NOTES
Verbal Bedside shift change report given to me (oncoming nurse) by Yamel Tracey RN (offgoing nurse). Report included the following information SBAR, Kardex, ED Summary, Intake/Output, MAR, Recent Results and Med Rec Status.

## 2018-09-06 NOTE — PROGRESS NOTES
7573) Bedside shift change report given to Arnol Funk RN (oncoming nurse) by Candi Hidalgo RN (offgoing nurse). Report included the following information SBAR, Kardex, MAR, Accordion and Recent Results. 0930)  [id number] to ask about pain, plan for shower and wound care, last bowel movement,  
1415) Ask Graciela  if resources available for prosthetics--pt request 
1950) Bedside shift change report given to Jil Lai RN (oncoming nurse) by Arnol Funk RN (offgoing nurse). Report included the following information SBAR, Kardex, MAR, Accordion and Recent Results.

## 2018-09-06 NOTE — PROGRESS NOTES
Hospitalist Progress Note NAME: Conrad Hernandez :  1970 MRN:  [id number] Assessment / Plan: 
 
Long term patient transferred from [facility] for completion of antibiotics Update- 
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 
long term IV vancomycin and zosyn through 10/1/2018 ID Dr Kenney Nath will follow along 
 pain controlled with oral meds 
  
Code Status: full Surrogate Decision Maker: Brenden Mondragon, sujatasin 
  
DVT Prophylaxis: lovenox GI Prophylaxis: not indicated 
  
Baseline: independent Subjective: Chief Complaint / Reason for Physician Visit Pt speaks minimal english obtained information directly from patient \"ok\". Discussed with RN events overnight. Review of Systems: 
Symptom Y/N Comments  Symptom Y/N Comments Fever/Chills    Chest Pain n   
Poor Appetite n   Edema Cough    Abdominal Pain n   
Sputum    Joint Pain y   
SOB/AIKEN n   Pruritis/Rash Nausea/vomit n   Tolerating PT/OT y Diarrhea n   Tolerating Diet y Constipation    Other Could NOT obtain due to:   
 
Objective: VITALS:  
Last 24hrs VS reviewed since prior progress note. Most recent are: 
Patient Vitals for the past 24 hrs: 
 Temp Pulse Resp BP SpO2  
18 0944 98 °F (36.7 °C) 80 12 (!) 166/105 100 % 18 0446 98.2 °F (36.8 °C) 75 18 137/71 99 % 18 2040 97.5 °F (36.4 °C) 76 18 145/70 99 % 18 1535 98.1 °F (36.7 °C) 86 - 138/76 99 % Intake/Output Summary (Last 24 hours) at 18 1516 Last data filed at 18 4159 Gross per 24 hour Intake              500 ml Output                0 ml Net              500 ml PHYSICAL EXAM: 
General: Alert, cooperative, no acute distress   
EENT:  EOMI. Anicteric sclerae. MMM Resp:  CTA bilaterally, no wheezing or rales. No accessory muscle use CV:  Regular  rhythm,  No edema GI:  Soft, Non distended, Non tender.  +Bowel sounds Neurologic:  Alert and oriented X 3, normal speech,  
 Psych:   Good insight. Not anxious nor agitated Ext:  Left arm stump in dressing Reviewed most current lab test results and cultures  YES Reviewed most current radiology test results   YES Review and summation of old records today    NO Reviewed patient's current orders and MAR    YES 
PMH/SH reviewed - no change compared to H&P 
________________________________________________________________________ Care Plan discussed with: 
  Comments Patient x Family RN x Care Manager x Consultant Multidiciplinary team rounds were held today with , nursing, pharmacist and clinical coordinator. Patient's plan of care was discussed; medications were reviewed and discharge planning was addressed. ________________________________________________________________________ Total NON critical care TIME:  30  Minutes Total CRITICAL CARE TIME Spent:   Minutes non procedure based Comments >50% of visit spent in counseling and coordination of care    
________________________________________________________________________ Selvin Blancas MD  
 
Procedures: see electronic medical records for all procedures/Xrays and details which were not copied into this note but were reviewed prior to creation of Plan. LABS: 
I reviewed today's most current labs and imaging studies. Pertinent labs include: No results for input(s): WBC, HGB, HCT, PLT, HGBEXT, HCTEXT, PLTEXT, HGBEXT, HCTEXT, PLTEXT in the last 72 hours. Recent Labs  
   09/06/18 
 0419  09/05/18 
 0334  09/04/18 
 0440 CREA  0.62*  0.67*  0.65* Signed: Selvin Blancas MD

## 2018-09-06 NOTE — PROGRESS NOTES
Problem: General Wound Care Goal: *Infected Wound: Prevention of further infection and promotion of healing Infection control procedures (eg: clean dressings, clean gloves, hand washing, precautions to isolate wound from contamination, sterile instruments used for wound debridement) should be implemented. Outcome: Progressing Towards Goal 
Pt wound clean dry and intact, cont on antibiotic therapy for osteomyelitis. No s/s of infection and reaction. Will cont to monitor Problem: Falls - Risk of 
Goal: *Absence of Falls Document Ashok Levels Fall Risk and appropriate interventions in the flowsheet. Outcome: Progressing Towards Goal 
Fall Risk Interventions: 
  
 
  
 
Medication Interventions: Teach patient to arise slowly History of Falls Interventions: Evaluate medications/consider consulting pharmacy

## 2018-09-07 LAB — CREAT SERPL-MCNC: 0.62 MG/DL (ref 0.7–1.3)

## 2018-09-07 PROCEDURE — 74011250637 HC RX REV CODE- 250/637: Performed by: HOSPITALIST

## 2018-09-07 PROCEDURE — 97602 WOUND(S) CARE NON-SELECTIVE: CPT

## 2018-09-07 PROCEDURE — 82565 ASSAY OF CREATININE: CPT | Performed by: HOSPITALIST

## 2018-09-07 PROCEDURE — 36592 COLLECT BLOOD FROM PICC: CPT

## 2018-09-07 PROCEDURE — 74011000258 HC RX REV CODE- 258: Performed by: HOSPITALIST

## 2018-09-07 PROCEDURE — 74011250637 HC RX REV CODE- 250/637: Performed by: EMERGENCY MEDICINE

## 2018-09-07 PROCEDURE — 74011250636 HC RX REV CODE- 250/636: Performed by: HOSPITALIST

## 2018-09-07 RX ORDER — ACETAMINOPHEN 325 MG/1
650 TABLET ORAL
Status: DISCONTINUED | OUTPATIENT
Start: 2018-09-07 | End: 2018-10-02 | Stop reason: HOSPADM

## 2018-09-07 RX ADMIN — Medication 10 ML: at 04:00

## 2018-09-07 RX ADMIN — VANCOMYCIN HYDROCHLORIDE 1250 MG: 1 INJECTION, POWDER, LYOPHILIZED, FOR SOLUTION INTRAVENOUS at 09:15

## 2018-09-07 RX ADMIN — HYDROCODONE BITARTRATE AND ACETAMINOPHEN 1 TABLET: 5; 325 TABLET ORAL at 23:52

## 2018-09-07 RX ADMIN — VANCOMYCIN HYDROCHLORIDE 1250 MG: 1 INJECTION, POWDER, LYOPHILIZED, FOR SOLUTION INTRAVENOUS at 23:46

## 2018-09-07 RX ADMIN — Medication 1 CAPSULE: at 09:18

## 2018-09-07 RX ADMIN — VANCOMYCIN HYDROCHLORIDE 1250 MG: 1 INJECTION, POWDER, LYOPHILIZED, FOR SOLUTION INTRAVENOUS at 15:55

## 2018-09-07 RX ADMIN — ENOXAPARIN SODIUM 40 MG: 40 INJECTION, SOLUTION INTRAVENOUS; SUBCUTANEOUS at 14:42

## 2018-09-07 RX ADMIN — Medication 10 ML: at 14:43

## 2018-09-07 RX ADMIN — Medication 10 ML: at 22:00

## 2018-09-07 RX ADMIN — Medication 10 ML: at 06:00

## 2018-09-07 RX ADMIN — HYDROCODONE BITARTRATE AND ACETAMINOPHEN 1 TABLET: 5; 325 TABLET ORAL at 09:18

## 2018-09-07 RX ADMIN — HYDROCODONE BITARTRATE AND ACETAMINOPHEN 1 TABLET: 5; 325 TABLET ORAL at 00:12

## 2018-09-07 RX ADMIN — SODIUM CHLORIDE 3.38 G: 900 INJECTION, SOLUTION INTRAVENOUS at 11:21

## 2018-09-07 RX ADMIN — SODIUM CHLORIDE 3.38 G: 900 INJECTION, SOLUTION INTRAVENOUS at 02:30

## 2018-09-07 RX ADMIN — HYDROCODONE BITARTRATE AND ACETAMINOPHEN 1 TABLET: 5; 325 TABLET ORAL at 15:51

## 2018-09-07 RX ADMIN — SODIUM CHLORIDE 3.38 G: 900 INJECTION, SOLUTION INTRAVENOUS at 18:25

## 2018-09-07 NOTE — PROGRESS NOTES
Nutrition Assessment: 
 
INTERVENTIONS/RECOMMENDATIONS:  
Meals/Snacks: General/healthful diet:  Continue cardiac diet. Enc po. Supplements: Commercial supplement:  Continue ensure shake once daily. ASSESSMENT:  
9/7:  Chart reviewed; med noted for osteomyelitis. Minimally speaks english. Pt tolerates a cardiac diet + ensure shakes once daily. No new nutrition dx at this time. Diet Order: Cardiac 
% Eaten:  Patient Vitals for the past 72 hrs: 
 % Diet Eaten 09/05/18 0058 0 % 09/04/18 2011 0 % Pertinent Medications: [x] Reviewed []Other: 
Pertinent Labs: [x]Reviewed  []Other: No new labs for review Food Allergies: [x]None []Other:    
Last BM:    [x]Active     []Hyperactive  []Hypoactive       [] Absent  BS Skin:    [] Intact   [] Incision  [x] Breakdown   []Edema   []Other: Anthropometrics: Height: 5' 4\" (162.6 cm) Weight: 64.5 kg (142 lb 3.2 oz) IBW (%IBW): 59 kg (130 lb) (109.38 %) UBW (%UBW):   (  %) BMI: Body mass index is 24.41 kg/(m^2). This BMI is indicative of: 
[]Underweight   []Normal   []Overweight   [] Obesity   [] Extreme Obesity (BMI>40) Last Weight Metrics: 
Weight Loss Metrics 8/30/2018 8/23/2018 8/16/2018 8/16/2018 8/16/2018 7/27/2018 Today's Wt 142 lb 3.2 oz 143 lb - 144 lb - 140 lb BMI 24.41 kg/m2 - 24.55 kg/m2 - 24.72 kg/m2 24.03 kg/m2 Estimated Nutrition Needs (Based on): 1846 Kcals/day (1420 x 1.3 AF) , 72 g (1.2 g/kg) Protein Carbohydrate: At Least 130 g/day  Fluids: 1900 mL/day NUTRITION DIAGNOSES:  
Problem:  Impaired ability to prepare food/meal     
Etiology: related to poor diet, poor compliance, infection Signs/Symptoms: as evidenced by BMI, labs, am   
Previous Nutrition Dx:  [] Resolved  [] Unresolved           [] Progressing NUTRITION INTERVENTIONS: 
Meals/Snacks: General/healthful diet   Supplements: Commercial supplement Feeding Assistance: Meal setup GOAL:  
PO intake > 75% most meals NUTRITION MONITORING AND EVALUATION Behavioral-Environmental Outcomes: Behavior, Acceptance of assist with eating Food/Nutrient Intake Outcomes: Total energy intake Physical Signs/Symptoms Outcomes: Weight/weight change Previous Goal Met: 
 [] Met              [x] Progressing Towards Goal              [] Not Progressing Towards Goal  
Previous Recommendations: 
 [] Implemented          [] Not Implemented          [x] Not Applicable LEARNING NEEDS (Diet, Food/Nutrient-Drug Interaction):  
 [x] None Identified 
 [] Identified and Education Provided/Documented 
 [] Identified and Pt declined/was not appropriate Cultural, Jewish, OR Ethnic Dietary Needs:  
 [x] None Identified 
 [] Identified and Addressed 
 
 [x] Interdisciplinary Care Plan Reviewed/Documented  
 [x] Discharge Planning:  Continue cardiac diet as ordered. [] Participated in Interdisciplinary Rounds NUTRITION RISK:  
 [x] Patient At Nutritional Risk  
 [] Patient Not At Nutritional Risk Manny Hernandez RD Pager [phone] Weekend Pager [phone]

## 2018-09-07 NOTE — PROGRESS NOTES
Hospitalist Progress Note NAME: Whit Dinh :  1970 MRN:  [id number] Assessment / Plan: 
 
Long term patient transferred from [facility] for completion of antibiotics Update- 
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 
long term IV vancomycin and zosyn through 10/1/2018 ID Dr Marycarmen Henderson will follow along 
 pain controlled with oral meds 
  
Code Status: full Surrogate Decision Maker: Brenden Mondragon cousin 
  
DVT Prophylaxis: lovenox GI Prophylaxis: not indicated 
  
Baseline: independent Subjective: Chief Complaint / Reason for Physician Visit Pt speaks minimal english obtained information directly from patient \"ok\". Discussed with RN events overnight. Review of Systems: 
Symptom Y/N Comments  Symptom Y/N Comments Fever/Chills    Chest Pain n   
Poor Appetite n   Edema Cough    Abdominal Pain n   
Sputum    Joint Pain y   
SOB/AIKEN n   Pruritis/Rash Nausea/vomit n   Tolerating PT/OT y Diarrhea n   Tolerating Diet y Constipation    Other Could NOT obtain due to:   
 
Objective: VITALS:  
Last 24hrs VS reviewed since prior progress note. Most recent are: 
Patient Vitals for the past 24 hrs: 
 Temp Pulse Resp BP SpO2  
18 1441 97.4 °F (36.3 °C) 71 16 (!) 148/105 99 % 18 0909 98.5 °F (36.9 °C) 70 14 145/76 97 % 18 0427 98.5 °F (36.9 °C) 67 16 131/65 97 % 18 2000 97.7 °F (36.5 °C) 70 16 140/67 97 % 18 1542 96.2 °F (35.7 °C) 67 16 125/61 99 % Intake/Output Summary (Last 24 hours) at 18 1442 Last data filed at 18 1984 Gross per 24 hour Intake              700 ml Output                0 ml Net              700 ml PHYSICAL EXAM: 
General: Alert, cooperative, no acute distress   
EENT:  EOMI. Anicteric sclerae. MMM Resp:  CTA bilaterally, no wheezing or rales. No accessory muscle use CV:  Regular  rhythm,  No edema GI:  Soft, Non distended, Non tender.  +Bowel sounds Neurologic:  Alert and oriented X 3, normal speech, Psych:   Good insight. Not anxious nor agitated Ext:  Left arm stump in dressing Reviewed most current lab test results and cultures  YES Reviewed most current radiology test results   YES Review and summation of old records today    NO Reviewed patient's current orders and MAR    YES 
PMH/SH reviewed - no change compared to H&P 
________________________________________________________________________ Care Plan discussed with: 
  Comments Patient x Family RN x Care Manager x Consultant Multidiciplinary team rounds were held today with , nursing, pharmacist and clinical coordinator. Patient's plan of care was discussed; medications were reviewed and discharge planning was addressed. ________________________________________________________________________ Total NON critical care TIME:  30  Minutes Total CRITICAL CARE TIME Spent:   Minutes non procedure based Comments >50% of visit spent in counseling and coordination of care    
________________________________________________________________________ Karen Vargas MD  
 
Procedures: see electronic medical records for all procedures/Xrays and details which were not copied into this note but were reviewed prior to creation of Plan. LABS: 
I reviewed today's most current labs and imaging studies. Pertinent labs include: No results for input(s): WBC, HGB, HCT, PLT, HGBEXT, HCTEXT, PLTEXT, HGBEXT, HCTEXT, PLTEXT in the last 72 hours. Recent Labs  
   09/07/18 
 0422  09/06/18 
 0419  09/05/18 
 2590 CREA  0.62*  0.62*  0.67* Signed: Karen Vargas MD

## 2018-09-07 NOTE — PROGRESS NOTES
Verbal Bedside shift change report given to Damari Michelle RN(oncoming nurse) by me (offgoing nurse). Report included the following information SBAR, Kardex, MAR, Recent Results and Med Rec Status. pt slept over night, medicated for pain x 1.

## 2018-09-07 NOTE — INTERDISCIPLINARY ROUNDS
IDR with Dr. Jacquelyn Rondon (MD), Lokesh Najera (pharmacist), Talia Haji (nursing student), and Jr Tay (RN) to discuss plan of care including pain management and blood pressure

## 2018-09-07 NOTE — PROGRESS NOTES
Problem: Falls - Risk of 
Goal: *Absence of Falls Document Mahamed Trevizo Fall Risk and appropriate interventions in the flowsheet. Outcome: Progressing Towards Goal 
Fall Risk Interventions: 
  
 
  
 
Medication Interventions: Teach patient to arise slowly History of Falls Interventions: Evaluate medications/consider consulting pharmacy

## 2018-09-08 LAB — CREAT SERPL-MCNC: 0.59 MG/DL (ref 0.7–1.3)

## 2018-09-08 PROCEDURE — 74011250637 HC RX REV CODE- 250/637: Performed by: EMERGENCY MEDICINE

## 2018-09-08 PROCEDURE — 74011000258 HC RX REV CODE- 258: Performed by: INTERNAL MEDICINE

## 2018-09-08 PROCEDURE — 74011250636 HC RX REV CODE- 250/636: Performed by: INTERNAL MEDICINE

## 2018-09-08 PROCEDURE — 74011250637 HC RX REV CODE- 250/637: Performed by: HOSPITALIST

## 2018-09-08 PROCEDURE — 36415 COLL VENOUS BLD VENIPUNCTURE: CPT | Performed by: HOSPITALIST

## 2018-09-08 PROCEDURE — 74011250636 HC RX REV CODE- 250/636: Performed by: HOSPITALIST

## 2018-09-08 PROCEDURE — 82565 ASSAY OF CREATININE: CPT | Performed by: HOSPITALIST

## 2018-09-08 RX ADMIN — Medication 10 ML: at 23:58

## 2018-09-08 RX ADMIN — Medication 10 ML: at 06:00

## 2018-09-08 RX ADMIN — PIPERACILLIN SODIUM AND TAZOBACTAM SODIUM 3.38 G: 3; .375 INJECTION, POWDER, LYOPHILIZED, FOR SOLUTION INTRAVENOUS at 04:24

## 2018-09-08 RX ADMIN — PIPERACILLIN SODIUM AND TAZOBACTAM SODIUM 3.38 G: 3; .375 INJECTION, POWDER, LYOPHILIZED, FOR SOLUTION INTRAVENOUS at 10:15

## 2018-09-08 RX ADMIN — Medication 10 ML: at 14:11

## 2018-09-08 RX ADMIN — Medication 1 CAPSULE: at 09:59

## 2018-09-08 RX ADMIN — HYDROCODONE BITARTRATE AND ACETAMINOPHEN 1 TABLET: 5; 325 TABLET ORAL at 11:07

## 2018-09-08 RX ADMIN — ENOXAPARIN SODIUM 40 MG: 40 INJECTION, SOLUTION INTRAVENOUS; SUBCUTANEOUS at 14:11

## 2018-09-08 RX ADMIN — VANCOMYCIN HYDROCHLORIDE 1250 MG: 1 INJECTION, POWDER, LYOPHILIZED, FOR SOLUTION INTRAVENOUS at 17:25

## 2018-09-08 RX ADMIN — VANCOMYCIN HYDROCHLORIDE 1250 MG: 1 INJECTION, POWDER, LYOPHILIZED, FOR SOLUTION INTRAVENOUS at 23:59

## 2018-09-08 RX ADMIN — PIPERACILLIN SODIUM AND TAZOBACTAM SODIUM 3.38 G: 3; .375 INJECTION, POWDER, LYOPHILIZED, FOR SOLUTION INTRAVENOUS at 18:25

## 2018-09-08 RX ADMIN — VANCOMYCIN HYDROCHLORIDE 1250 MG: 1 INJECTION, POWDER, LYOPHILIZED, FOR SOLUTION INTRAVENOUS at 10:09

## 2018-09-08 NOTE — PROGRESS NOTES
Bedside shift change report given to me(oncoming nurse) by Denver Olmedo RN(offgoing nurse). Report included the following information SBAR, Kardex, Procedure Summary, Intake/Output, MAR, Accordion, Recent Results and Med Rec Status. 1030 Dressing to left stump changed. Flushed off with NS and then covered with adeptex and kerlex. [id number] Patient has appeared to be disgusted with everything but when I ask him he says he is ok. This time I go in his room and he is crying. I asked him to talk but he refused. 1925 Bedside shift change report given to [address] (oncoming nurse) by me (offgoing nurse). Report included the following information SBAR, Kardex, Intake/Output, MAR, Accordion, Recent Results and Med Rec Status.

## 2018-09-08 NOTE — PROGRESS NOTES
Hospitalist Progress Note NAME: Conrad Hernandez :  1970 MRN:  [id number] Assessment / Plan: 
 
Long term patient transferred from [facility] for completion of antibiotics Update- 
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 
long term IV vancomycin and zosyn through 10/1/2018 ID Dr Kenney Nath will follow along 
 pain controlled with oral meds 
  
Code Status: full Surrogate Decision Maker: Brenden Mondragno, sujatasin 
  
DVT Prophylaxis: lovenox GI Prophylaxis: not indicated 
  
Baseline: independent Subjective: Chief Complaint / Reason for Physician Visit Pt speaks minimal english obtained information directly from patient \"ok\". Discussed with RN events overnight. Review of Systems: 
Symptom Y/N Comments  Symptom Y/N Comments Fever/Chills    Chest Pain n   
Poor Appetite n   Edema Cough    Abdominal Pain n   
Sputum    Joint Pain y   
SOB/AIKEN n   Pruritis/Rash Nausea/vomit n   Tolerating PT/OT y Diarrhea n   Tolerating Diet y Constipation    Other Could NOT obtain due to:   
 
Objective: VITALS:  
Last 24hrs VS reviewed since prior progress note. Most recent are: 
Patient Vitals for the past 24 hrs: 
 Temp Pulse Resp BP SpO2  
18 0900 98.1 °F (36.7 °C) 78 18 119/74 100 % 18 0400 97.8 °F (36.6 °C) 88 16 - 100 % 18 1441 97.4 °F (36.3 °C) 71 16 (!) 148/105 99 % Intake/Output Summary (Last 24 hours) at 18 1142 Last data filed at 18 0400 Gross per 24 hour Intake                0 ml Output                0 ml Net                0 ml PHYSICAL EXAM: 
General: Alert, cooperative, no acute distress   
EENT:  EOMI. Anicteric sclerae. MMM Resp:  CTA bilaterally, no wheezing or rales. No accessory muscle use CV:  Regular  rhythm,  No edema GI:  Soft, Non distended, Non tender.  +Bowel sounds Neurologic:  Alert and oriented X 3, normal speech, Psych:   Good insight. Not anxious nor agitated Ext:  Left arm stump in dressing Reviewed most current lab test results and cultures  YES Reviewed most current radiology test results   YES Review and summation of old records today    NO Reviewed patient's current orders and MAR    YES 
PMH/SH reviewed - no change compared to H&P 
________________________________________________________________________ Care Plan discussed with: 
  Comments Patient x Family RN x Care Manager x Consultant Multidiciplinary team rounds were held today with , nursing, pharmacist and clinical coordinator. Patient's plan of care was discussed; medications were reviewed and discharge planning was addressed. ________________________________________________________________________ Total NON critical care TIME:  30  Minutes Total CRITICAL CARE TIME Spent:   Minutes non procedure based Comments >50% of visit spent in counseling and coordination of care    
________________________________________________________________________ Micheal Mack MD  
 
Procedures: see electronic medical records for all procedures/Xrays and details which were not copied into this note but were reviewed prior to creation of Plan. LABS: 
I reviewed today's most current labs and imaging studies. Pertinent labs include: No results for input(s): WBC, HGB, HCT, PLT, HGBEXT, HCTEXT, PLTEXT, HGBEXT, HCTEXT, PLTEXT in the last 72 hours. Recent Labs  
   09/08/18 
 0426  09/07/18 
 0422  09/06/18 
 9711 CREA  0.59*  0.62*  0.62* Signed: Micheal Mack MD

## 2018-09-08 NOTE — PROGRESS NOTES
Problem: Falls - Risk of 
Goal: *Absence of Falls Document Isi Sullivan Fall Risk and appropriate interventions in the flowsheet. Outcome: Progressing Towards Goal 
Fall Risk Interventions: 
  
 
  
 
Medication Interventions: Patient to call before getting OOB History of Falls Interventions: Evaluate medications/consider consulting pharmacy

## 2018-09-08 NOTE — PROGRESS NOTES
0730) Bedside shift change report given to Delaney (student nurse) and  Arnol Funk RN (oncoming nurse) by Jil Lai RN (offgoing nurse). Report included the following information SBAR, Kardex, MAR, Accordion and Recent Results. 1530) Pt in shower. [phone]) Wound care with pt.  
1920) Bedside shift change report given to Woo Cruz (oncoming nurse) by Arnol Funk RN (offgoing nurse). Report included the following information SBAR, Kardex, MAR, Accordion and Recent Results.

## 2018-09-08 NOTE — PROGRESS NOTES
Problem: Falls - Risk of 
Goal: *Absence of Falls Document Fabien Kins Fall Risk and appropriate interventions in the flowsheet. Outcome: Progressing Towards Goal 
Fall Risk Interventions: 
  
 
  
 
Medication Interventions: Patient to call before getting OOB History of Falls Interventions: Evaluate medications/consider consulting pharmacy

## 2018-09-08 NOTE — PROGRESS NOTES
[address] [address] Pharmacy Dosing Services: Antimicrobial Stewardship Daily Doc Consult for antibiotic dosing of vancomycin by Dr. Iona Epley (continuing from [facility]) Indication: osteomyelitis (confirmed on MRI) Day of Therapy 19 (until 10/1/18) Ht Readings from Last 1 Encounters:  
08/30/18 162.6 cm (64\") Wt Readings from Last 1 Encounters:  
08/30/18 64.5 kg (142 lb 3.2 oz) FYI: daily SCr ordered [Previous Vanc 8/16-8/19 (last dose 8/19 @ 0600 then DC'ed) now restarted since 8/21/18] Continue Vanco/Zosyn regimen until 10/1/18- clarified w/MD 
 
Vancomycin therapy: 
Continue Current maintenance dose: 1250 (mg) every 8 hours. Dose calculated to approximate a therapeutic trough of 15-20 mcg/mL. Plan for level / Adjustment in Therapy: afebrile, SCr stable; continue current dosing regimen & plan for level 9/12 (not yet ordered). Other Antimicrobial  
(not dosed by pharmacist) Zosyn 3.375 G IV Q8H Cultures 8/16 wound: Pseudomonas (susc pip/tazo) @ Final 
8/16 blood: Negative @ Final 
8/16 wound: Pseudomonas (susc pip/tazo) @ Final  
Serum Creatinine Lab Results Component Value Date/Time Creatinine 0.59 (L) 09/08/2018 04:26 AM  
  
  
Creatinine Clearance Estimated Creatinine Clearance: 125.4 mL/min (based on Cr of 0.59). Temp Temp: 98.1 °F (36.7 °C) WBC Lab Results Component Value Date/Time WBC 3.9 (L) 08/25/2018 03:22 AM  
 
  
H/H Lab Results Component Value Date/Time HGB 11.5 (L) 08/25/2018 03:22 AM  
 
  
Platelets Lab Results Component Value Date/Time  PLATELET 193 34/76/5736 03:22 AM  
 
  
 
Pharmacist Indira Coffey [facility]

## 2018-09-09 LAB — CREAT SERPL-MCNC: 0.65 MG/DL (ref 0.7–1.3)

## 2018-09-09 PROCEDURE — 82565 ASSAY OF CREATININE: CPT | Performed by: HOSPITALIST

## 2018-09-09 PROCEDURE — 74011250637 HC RX REV CODE- 250/637: Performed by: HOSPITALIST

## 2018-09-09 PROCEDURE — 74011250636 HC RX REV CODE- 250/636: Performed by: HOSPITALIST

## 2018-09-09 PROCEDURE — 74011000258 HC RX REV CODE- 258: Performed by: INTERNAL MEDICINE

## 2018-09-09 PROCEDURE — 74011250636 HC RX REV CODE- 250/636: Performed by: INTERNAL MEDICINE

## 2018-09-09 PROCEDURE — 74011250637 HC RX REV CODE- 250/637: Performed by: EMERGENCY MEDICINE

## 2018-09-09 RX ADMIN — Medication 10 ML: at 13:54

## 2018-09-09 RX ADMIN — Medication 10 ML: at 07:38

## 2018-09-09 RX ADMIN — VANCOMYCIN HYDROCHLORIDE 1250 MG: 1 INJECTION, POWDER, LYOPHILIZED, FOR SOLUTION INTRAVENOUS at 16:24

## 2018-09-09 RX ADMIN — Medication 1 CAPSULE: at 09:20

## 2018-09-09 RX ADMIN — HYDROCODONE BITARTRATE AND ACETAMINOPHEN 1 TABLET: 5; 325 TABLET ORAL at 23:03

## 2018-09-09 RX ADMIN — PIPERACILLIN SODIUM AND TAZOBACTAM SODIUM 3.38 G: 3; .375 INJECTION, POWDER, LYOPHILIZED, FOR SOLUTION INTRAVENOUS at 18:00

## 2018-09-09 RX ADMIN — ENOXAPARIN SODIUM 40 MG: 40 INJECTION, SOLUTION INTRAVENOUS; SUBCUTANEOUS at 13:54

## 2018-09-09 RX ADMIN — Medication 10 ML: at 23:04

## 2018-09-09 RX ADMIN — PIPERACILLIN SODIUM AND TAZOBACTAM SODIUM 3.38 G: 3; .375 INJECTION, POWDER, LYOPHILIZED, FOR SOLUTION INTRAVENOUS at 02:02

## 2018-09-09 RX ADMIN — Medication 10 ML: at 22:00

## 2018-09-09 RX ADMIN — PIPERACILLIN SODIUM AND TAZOBACTAM SODIUM 3.38 G: 3; .375 INJECTION, POWDER, LYOPHILIZED, FOR SOLUTION INTRAVENOUS at 09:47

## 2018-09-09 RX ADMIN — Medication 10 ML: at 07:37

## 2018-09-09 RX ADMIN — VANCOMYCIN HYDROCHLORIDE 1250 MG: 1 INJECTION, POWDER, LYOPHILIZED, FOR SOLUTION INTRAVENOUS at 09:20

## 2018-09-09 NOTE — PROGRESS NOTES
Hospitalist Progress Note NAME: Criselda Boswell :  1970 MRN:  [id number] Assessment / Plan: 
  
No change in plan of care Humerus steomyelitis (2018): POA 
- long term IV vancomycin and zosyn through 10/1/2018 
- ID Dr Jing Rodriguez will follow along - pain controlled with oral meds 
  
Code Status: full Surrogate Decision Maker: Brenden Mondragon cousin 
  
DVT Prophylaxis: lovenox GI Prophylaxis: not indicated 
  
Baseline: independent Subjective: Chief Complaint / Reason for Physician Visit Pt speaks minimal english obtained information directly from patient Discussed with RN events overnight. Review of Systems: 
Symptom Y/N Comments  Symptom Y/N Comments Fever/Chills    Chest Pain Poor Appetite    Edema Cough    Abdominal Pain Sputum    Joint Pain y   
SOB/AIKEN    Pruritis/Rash Nausea/vomit    Tolerating PT/OT y Diarrhea n   Tolerating Diet y Constipation    Other Could NOT obtain due to:   
 
Objective: VITALS:  
Last 24hrs VS reviewed since prior progress note. Most recent are: 
Patient Vitals for the past 24 hrs: 
 Temp Pulse Resp BP SpO2  
18 0828 98.6 °F (37 °C) 63 16 110/60 99 % 18 2359 98.5 °F (36.9 °C) 68 16 119/61 98 % 18 1549 97.8 °F (36.6 °C) 94 18 120/78 99 % Intake/Output Summary (Last 24 hours) at 18 1057 Last data filed at 18 2359 Gross per 24 hour Intake              350 ml Output                0 ml Net              350 ml PHYSICAL EXAM: 
General: no acute distress   
Neurologic:  Alert and oriented X 3, normal speech Psych:   Not anxious nor agitated Ext:  Left arm stump in dressing Reviewed most current lab test results and cultures  YES Reviewed most current radiology test results   YES Review and summation of old records today    NO Reviewed patient's current orders and MAR    YES 
PMH/SH reviewed - no change compared to H&P 
 ________________________________________________________________________ Care Plan discussed with: 
  Comments Patient x Family RN x Care Manager x Consultant Multidiciplinary team rounds were held today with , nursing, pharmacist and clinical coordinator. Patient's plan of care was discussed; medications were reviewed and discharge planning was addressed. ________________________________________________________________________ Total NON critical care TIME:  15  Minutes Total CRITICAL CARE TIME Spent:   Minutes non procedure based Comments >50% of visit spent in counseling and coordination of care    
________________________________________________________________________ Love Mabry MD  
 
Procedures: see electronic medical records for all procedures/Xrays and details which were not copied into this note but were reviewed prior to creation of Plan. LABS: 
I reviewed today's most current labs and imaging studies. Pertinent labs include: No results for input(s): WBC, HGB, HCT, PLT, HGBEXT, HCTEXT, PLTEXT, HGBEXT, HCTEXT, PLTEXT in the last 72 hours. Recent Labs  
   09/09/18 
 0738  09/08/18 0426  09/07/18 0422 CREA  0.65*  0.59*  0.62* Signed: Love Mabry MD

## 2018-09-09 NOTE — PROGRESS NOTES
Problem: Falls - Risk of 
Goal: *Absence of Falls Document Datto Kappa Fall Risk and appropriate interventions in the flowsheet. Outcome: Progressing Towards Goal 
Fall Risk Interventions: 
  
 
  
 
Medication Interventions: Teach patient to arise slowly History of Falls Interventions: Door open when patient unattended

## 2018-09-09 NOTE — PROGRESS NOTES
Problem: Falls - Risk of 
Goal: *Absence of Falls Document Mahamed Trevizo Fall Risk and appropriate interventions in the flowsheet. Outcome: Progressing Towards Goal 
Fall Risk Interventions: 
  
 
  
 
Medication Interventions: Teach patient to arise slowly History of Falls Interventions: Door open when patient unattended

## 2018-09-09 NOTE — PROGRESS NOTES
[address] [address] Pharmacy Dosing Services: Antimicrobial Stewardship Consult for antibiotic dosing of vancomycin by Dr. Garland Santoyo (continuing from [facility]) Indication: osteomyelitis (confirmed on MRI) Day of Therapy 20 (until 10/1/18) Ht Readings from Last 1 Encounters:  
08/30/18 162.6 cm (64\") Wt Readings from Last 1 Encounters:  
08/30/18 64.5 kg (142 lb 3.2 oz) [Previous Vanc 8/16-8/19 (last dose 8/19 @ 0600 then DC'ed) now restarted since 8/21/18] Continue Vanco/Zosyn regimen until 10/1/18- clarified w/MD 
FYI: daily SCr ordered Vancomycin therapy: 
Current maintenance dose: Vancomycin 1250 mg every 8 hours Dose calculated to approximate a therapeutic trough of 15-20 mcg/mL. Plan for level / Adjustment in Therapy: afebrile, SCr stable; continue current dosing regimen & plan for level 9/12 (not yet ordered). Other Antimicrobial  
(not dosed by pharmacist) Zosyn 3.375 G IV Q8H Cultures 8/16 wound: Pseudomonas (susc pip/tazo) @ Final 
8/16 blood: Negative @ Final 
8/16 wound: Pseudomonas (susc pip/tazo) @ Final  
Serum Creatinine Lab Results Component Value Date/Time Creatinine 0.65 (L) 09/09/2018 07:38 AM  
  
  
Creatinine Clearance Estimated Creatinine Clearance: 113.8 mL/min (based on Cr of 0.65). Temp Temp: 98.6 °F (37 °C) WBC Lab Results Component Value Date/Time WBC 3.9 (L) 08/25/2018 03:22 AM  
 
  
H/H Lab Results Component Value Date/Time HGB 11.5 (L) 08/25/2018 03:22 AM  
 
  
Platelets Lab Results Component Value Date/Time  PLATELET 353 89/47/9916 03:22 AM  
 
  
 
Pharmacist REGINA Presley [facility]

## 2018-09-09 NOTE — PROGRESS NOTES
Bedside shift change report given to me (oncoming nurse) by Jessica Mckeon RN (offgoing nurse). Report included the following information SBAR, Kardex, Intake/Output, MAR, Accordion, Recent Results and Med Rec Status. 1915 Bedside shift change report given to Kade (oncoming nurse) by me (offgoing nurse). Report included the following information SBAR, Kardex, Intake/Output, MAR, Accordion, Recent Results and Med Rec Status.

## 2018-09-09 NOTE — PROGRESS NOTES
Verbal Bedside shift change report given to me (oncoming nurse) by Jim Bailon RN (offgoing nurse). Report included the following information SBAR, Kardex, ED Summary, Intake/Output, MAR, Recent Results and Med Rec Status.

## 2018-09-10 LAB — CREAT SERPL-MCNC: 0.67 MG/DL (ref 0.7–1.3)

## 2018-09-10 PROCEDURE — 74011250636 HC RX REV CODE- 250/636: Performed by: HOSPITALIST

## 2018-09-10 PROCEDURE — 82565 ASSAY OF CREATININE: CPT | Performed by: HOSPITALIST

## 2018-09-10 PROCEDURE — 74011250636 HC RX REV CODE- 250/636: Performed by: INTERNAL MEDICINE

## 2018-09-10 PROCEDURE — 74011000258 HC RX REV CODE- 258: Performed by: INTERNAL MEDICINE

## 2018-09-10 PROCEDURE — 36415 COLL VENOUS BLD VENIPUNCTURE: CPT | Performed by: HOSPITALIST

## 2018-09-10 PROCEDURE — 74011250637 HC RX REV CODE- 250/637: Performed by: HOSPITALIST

## 2018-09-10 PROCEDURE — 74011250637 HC RX REV CODE- 250/637: Performed by: EMERGENCY MEDICINE

## 2018-09-10 PROCEDURE — 36592 COLLECT BLOOD FROM PICC: CPT

## 2018-09-10 RX ORDER — VANCOMYCIN HYDROCHLORIDE 1 G/20ML
INJECTION, POWDER, LYOPHILIZED, FOR SOLUTION INTRAVENOUS
Status: DISPENSED
Start: 2018-09-10 | End: 2018-09-11

## 2018-09-10 RX ADMIN — PIPERACILLIN SODIUM AND TAZOBACTAM SODIUM 3.38 G: 3; .375 INJECTION, POWDER, LYOPHILIZED, FOR SOLUTION INTRAVENOUS at 01:38

## 2018-09-10 RX ADMIN — PIPERACILLIN SODIUM AND TAZOBACTAM SODIUM 3.38 G: 3; .375 INJECTION, POWDER, LYOPHILIZED, FOR SOLUTION INTRAVENOUS at 09:55

## 2018-09-10 RX ADMIN — VANCOMYCIN HYDROCHLORIDE 1250 MG: 1 INJECTION, POWDER, LYOPHILIZED, FOR SOLUTION INTRAVENOUS at 17:34

## 2018-09-10 RX ADMIN — VANCOMYCIN HYDROCHLORIDE 1250 MG: 1 INJECTION, POWDER, LYOPHILIZED, FOR SOLUTION INTRAVENOUS at 08:40

## 2018-09-10 RX ADMIN — VANCOMYCIN HYDROCHLORIDE 1250 MG: 1 INJECTION, POWDER, LYOPHILIZED, FOR SOLUTION INTRAVENOUS at 00:01

## 2018-09-10 RX ADMIN — Medication 10 ML: at 21:47

## 2018-09-10 RX ADMIN — Medication 10 ML: at 03:45

## 2018-09-10 RX ADMIN — ENOXAPARIN SODIUM 40 MG: 40 INJECTION, SOLUTION INTRAVENOUS; SUBCUTANEOUS at 13:40

## 2018-09-10 RX ADMIN — Medication 10 ML: at 06:00

## 2018-09-10 RX ADMIN — Medication 10 ML: at 14:00

## 2018-09-10 RX ADMIN — PIPERACILLIN SODIUM AND TAZOBACTAM SODIUM 3.38 G: 3; .375 INJECTION, POWDER, LYOPHILIZED, FOR SOLUTION INTRAVENOUS at 17:34

## 2018-09-10 RX ADMIN — Medication 10 ML: at 02:37

## 2018-09-10 RX ADMIN — HYDROCODONE BITARTRATE AND ACETAMINOPHEN 1 TABLET: 5; 325 TABLET ORAL at 17:44

## 2018-09-10 RX ADMIN — Medication 1 CAPSULE: at 09:56

## 2018-09-10 NOTE — PROGRESS NOTES
Bedside shift change report given to me (oncoming nurse) by Dwaine Jones RN (offgoing nurse). Report included the following information SBAR, Kardex, Intake/Output, MAR, Accordion, Recent Results and Med Rec Status. 1922 Bedside shift change [facility] ([facility] by me (offgoing nurse). Report included the following information SBAR, Kardex, Intake/Output, MAR, Accordion, Recent Results and Med Rec Status.

## 2018-09-10 NOTE — PROGRESS NOTES
Verbal Bedside shift change report given to [address]  (oncoming nurse) by me (offgoing nurse). Report included the following information SBAR, Kardex, ED Summary, Intake/Output, MAR, Recent Results and Med Rec Status. medicated x1 for pain. Slept over night. Visit Vitals  /69 (BP 1 Location: Left leg, BP Patient Position: At rest)  Pulse 66  Temp 98 °F (36.7 °C)  Resp 18  Ht 5' 4\" (1.626 m)  Wt 64.5 kg (142 lb 3.2 oz)  SpO2 100%  BMI 24.41 kg/m2

## 2018-09-10 NOTE — PROGRESS NOTES
Problem: Falls - Risk of 
Goal: *Absence of Falls Document Debria Check Fall Risk and appropriate interventions in the flowsheet. Outcome: Progressing Towards Goal 
Fall Risk Interventions: 
  
 
  
 
Medication Interventions: Patient to call before getting OOB History of Falls Interventions: Door open when patient unattended

## 2018-09-10 NOTE — PROGRESS NOTES
Hospitalist Progress Note NAME: Marcus Lora :  1970 MRN:  [id number] Assessment / Plan: 
  
No change in plan of care or exam in last 24 hours. Humerus osteomyelitis (2018): POA 
- on long term IV vancomycin and zosyn through 10/1/2018 
- ID Dr Manfred Roberts will follow along - pain controlled with oral meds 
  
Code Status: full Surrogate Decision Maker: Brenden Mondragon cousin 
  
DVT Prophylaxis: lovenox GI Prophylaxis: not indicated 
  
Baseline: independent Subjective: Chief Complaint / Reason for Physician Visit Pt speaks minimal english obtained information directly from patient Discussed with RN events overnight. Review of Systems: 
Symptom Y/N Comments  Symptom Y/N Comments Fever/Chills    Chest Pain Poor Appetite    Edema Cough    Abdominal Pain Sputum    Joint Pain y   
SOB/AIKEN    Pruritis/Rash Nausea/vomit    Tolerating PT/OT y Diarrhea n   Tolerating Diet y Constipation    Other Could NOT obtain due to:   
 
Objective: VITALS:  
Last 24hrs VS reviewed since prior progress note. Most recent are: 
Patient Vitals for the past 24 hrs: 
 Temp Pulse Resp BP SpO2  
09/10/18 0349 98 °F (36.7 °C) 66 18 141/69 100 % 18 2000 98 °F (36.7 °C) 76 18 139/55 100 % 18 1617 98.4 °F (36.9 °C) 87 18 160/87 100 % Intake/Output Summary (Last 24 hours) at 09/10/18 7928 Last data filed at 18 2044 Gross per 24 hour Intake              500 ml Output                0 ml Net              500 ml PHYSICAL EXAM: 
General: no acute distress   
Neurologic:  Alert and oriented X 3, normal speech Psych:   Not anxious nor agitated Ext:  Left arm stump in dressing Reviewed most current lab test results and cultures  YES Reviewed most current radiology test results   YES Review and summation of old records today    NO Reviewed patient's current orders and MAR    YES 
PMH/SH reviewed - no change compared to H&P 
 ________________________________________________________________________ Care Plan discussed with: 
  Comments Patient x Family RN x Care Manager x Consultant Multidiciplinary team rounds were held today with , nursing, pharmacist and clinical coordinator. Patient's plan of care was discussed; medications were reviewed and discharge planning was addressed. ________________________________________________________________________ Total NON critical care TIME:  15  Minutes Total CRITICAL CARE TIME Spent:   Minutes non procedure based Comments >50% of visit spent in counseling and coordination of care    
________________________________________________________________________ Urszula Inman MD  
 
Procedures: see electronic medical records for all procedures/Xrays and details which were not copied into this note but were reviewed prior to creation of Plan. LABS: 
I reviewed today's most current labs and imaging studies. Pertinent labs include: No results for input(s): WBC, HGB, HCT, PLT, HGBEXT, HCTEXT, PLTEXT, HGBEXT, HCTEXT, PLTEXT in the last 72 hours. Recent Labs  
   09/10/18 
 0346  09/09/18 
 2503  09/08/18 
 9367 CREA  0.67*  0.65*  0.59* Signed: Urszula Inman MD

## 2018-09-11 LAB — CREAT SERPL-MCNC: 0.95 MG/DL (ref 0.7–1.3)

## 2018-09-11 PROCEDURE — 74011000258 HC RX REV CODE- 258: Performed by: INTERNAL MEDICINE

## 2018-09-11 PROCEDURE — 74011250636 HC RX REV CODE- 250/636: Performed by: HOSPITALIST

## 2018-09-11 PROCEDURE — 82565 ASSAY OF CREATININE: CPT | Performed by: HOSPITALIST

## 2018-09-11 PROCEDURE — C1751 CATH, INF, PER/CENT/MIDLINE: HCPCS

## 2018-09-11 PROCEDURE — 74011250637 HC RX REV CODE- 250/637: Performed by: HOSPITALIST

## 2018-09-11 PROCEDURE — 74011250636 HC RX REV CODE- 250/636: Performed by: INTERNAL MEDICINE

## 2018-09-11 PROCEDURE — 36415 COLL VENOUS BLD VENIPUNCTURE: CPT | Performed by: HOSPITALIST

## 2018-09-11 PROCEDURE — 74011000250 HC RX REV CODE- 250: Performed by: HOSPITALIST

## 2018-09-11 PROCEDURE — 36592 COLLECT BLOOD FROM PICC: CPT

## 2018-09-11 RX ADMIN — HEPARIN 300 UNITS: 100 SYRINGE at 08:36

## 2018-09-11 RX ADMIN — ALTEPLASE 2 MG: 2.2 INJECTION, POWDER, LYOPHILIZED, FOR SOLUTION INTRAVENOUS at 10:33

## 2018-09-11 RX ADMIN — PIPERACILLIN SODIUM AND TAZOBACTAM SODIUM 3.38 G: 3; .375 INJECTION, POWDER, LYOPHILIZED, FOR SOLUTION INTRAVENOUS at 23:21

## 2018-09-11 RX ADMIN — Medication 10 ML: at 15:26

## 2018-09-11 RX ADMIN — Medication 10 ML: at 05:00

## 2018-09-11 RX ADMIN — PIPERACILLIN SODIUM AND TAZOBACTAM SODIUM 3.38 G: 3; .375 INJECTION, POWDER, LYOPHILIZED, FOR SOLUTION INTRAVENOUS at 12:47

## 2018-09-11 RX ADMIN — HEPARIN 300 UNITS: 100 SYRINGE at 08:35

## 2018-09-11 RX ADMIN — Medication 1 CAPSULE: at 08:38

## 2018-09-11 RX ADMIN — Medication 10 ML: at 22:00

## 2018-09-11 RX ADMIN — ENOXAPARIN SODIUM 40 MG: 40 INJECTION, SOLUTION INTRAVENOUS; SUBCUTANEOUS at 13:00

## 2018-09-11 RX ADMIN — VANCOMYCIN HYDROCHLORIDE 1250 MG: 1 INJECTION, POWDER, LYOPHILIZED, FOR SOLUTION INTRAVENOUS at 23:29

## 2018-09-11 RX ADMIN — PIPERACILLIN SODIUM AND TAZOBACTAM SODIUM 3.38 G: 3; .375 INJECTION, POWDER, LYOPHILIZED, FOR SOLUTION INTRAVENOUS at 02:17

## 2018-09-11 RX ADMIN — VANCOMYCIN HYDROCHLORIDE 1250 MG: 1 INJECTION, POWDER, LYOPHILIZED, FOR SOLUTION INTRAVENOUS at 00:17

## 2018-09-11 RX ADMIN — VANCOMYCIN HYDROCHLORIDE 1250 MG: 1 INJECTION, POWDER, LYOPHILIZED, FOR SOLUTION INTRAVENOUS at 12:46

## 2018-09-11 RX ADMIN — ALTEPLASE 2 MG: 2.2 INJECTION, POWDER, LYOPHILIZED, FOR SOLUTION INTRAVENOUS at 10:54

## 2018-09-11 NOTE — PROGRESS NOTES
Bedside shift change report given to YARELIS Holloway (oncoming nurse) by Rolanda Nath (offgoing nurse). Report included the following information SBAR, Kardex, Intake/Output, MAR, Accordion and Recent Results.

## 2018-09-11 NOTE — PROGRESS NOTES
Hospitalist Progress Note NAME: Moe Amado :  1970 MRN:  [id number] Assessment / Plan: 
  
No change in plan of care or exam in last 24 hours. Humerus osteomyelitis (2018): POA 
- on long term IV vancomycin and zosyn through 10/1/2018 
- ID Dr Lucía Mendez to follow along. 
- pain controlled with oral meds 
  
Code Status: full Surrogate Decision Maker: Brenden Mondragon, sujatasin 
  
DVT Prophylaxis: lovenox GI Prophylaxis: not indicated 
  
Baseline: independent Subjective: Chief Complaint / Reason for Physician Visit Pt speaks minimal english obtained information directly from patient Discussed with RN events overnight. Review of Systems: 
Symptom Y/N Comments  Symptom Y/N Comments Fever/Chills    Chest Pain Poor Appetite    Edema Cough    Abdominal Pain Sputum    Joint Pain y   
SOB/AIKEN    Pruritis/Rash Nausea/vomit    Tolerating PT/OT y Diarrhea n   Tolerating Diet y Constipation    Other Could NOT obtain due to:   
 
Objective: VITALS:  
Last 24hrs VS reviewed since prior progress note. Most recent are: 
Patient Vitals for the past 24 hrs: 
 Temp Pulse Resp BP SpO2  
18 0741 97.4 °F (36.3 °C) 77 18 140/65 100 % 18 0436 97.5 °F (36.4 °C) 69 16 134/67 99 % 09/10/18 1941 98.2 °F (36.8 °C) 85 18 156/90 100 % 09/10/18 1825 98.8 °F (37.1 °C) 99 20 152/90 100 % 09/10/18 1600 98.9 °F (37.2 °C) 99 20 (!) 172/99 100 % Intake/Output Summary (Last 24 hours) at 18 1447 Last data filed at 09/10/18 2147 Gross per 24 hour Intake              120 ml Output                0 ml Net              120 ml PHYSICAL EXAM: 
General: no acute distress   
Neurologic:  Alert and oriented X 3, normal speech Ext:  Left arm stump in dressing Reviewed most current lab test results and cultures  YES Reviewed most current radiology test results   YES Review and summation of old records today    NO 
 Reviewed patient's current orders and MAR    YES 
PMH/SH reviewed - no change compared to H&P 
________________________________________________________________________ Care Plan discussed with: 
  Comments Patient x Family RN x Care Manager x Consultant Multidiciplinary team rounds were held today with , nursing, pharmacist and clinical coordinator. Patient's plan of care was discussed; medications were reviewed and discharge planning was addressed. ________________________________________________________________________ Total NON critical care TIME:  15  Minutes Total CRITICAL CARE TIME Spent:   Minutes non procedure based Comments >50% of visit spent in counseling and coordination of care    
________________________________________________________________________ Bob Kwon MD  
 
Procedures: see electronic medical records for all procedures/Xrays and details which were not copied into this note but were reviewed prior to creation of Plan. LABS: 
I reviewed today's most current labs and imaging studies. Pertinent labs include: No results for input(s): WBC, HGB, HCT, PLT, HGBEXT, HCTEXT, PLTEXT, HGBEXT, HCTEXT, PLTEXT in the last 72 hours. Recent Labs  
   09/11/18 
 1303  09/10/18 
 0346  09/09/18 
 7508 CREA  0.95  0.67*  0.65* Signed: Bob Kwon MD

## 2018-09-11 NOTE — PROGRESS NOTES
Problem: Falls - Risk of 
Goal: *Absence of Falls Document [facility] Fall Risk and appropriate interventions in the flowsheet. Outcome: Progressing Towards Goal 
Fall Risk Interventions: 
  
 
  
 
Medication Interventions: Patient to call before getting OOB History of Falls Interventions: Consult care management for discharge planning

## 2018-09-11 NOTE — PROGRESS NOTES
Patients central line was aspirated with positive blood flow from both red & blue lines. Total of 8mls was removed & wasted from each line. No resistance noted. Each line (red & blue) where flushed with 10ml's of saline (20ml's total). Lines flushed without difficulty. Patient tolerated. New protective caps (green) where placed on each line. While performing this procedure it was noticed that the patients dressing needed to be changed. Nurse notified.

## 2018-09-11 NOTE — PROGRESS NOTES
Cathflow (2mg) administered in central line (blue port). Nurse notified. Will allow to dwell 2hrs & then attempt to aspirate the medication. Patient tolerated.

## 2018-09-11 NOTE — PROGRESS NOTES
Cathflo (2mg) administered to central line (red port) only. Nurse notified. Will allow to dwell for 2hrs and then attempt to aspirate. Patient tolerated.

## 2018-09-11 NOTE — PROGRESS NOTES
Problem: Falls - Risk of 
Goal: *Absence of Falls Document Radha Rivera Fall Risk and appropriate interventions in the flowsheet. Outcome: Progressing Towards Goal 
Fall Risk Interventions: 
  
 
  
 
Medication Interventions: Teach patient to arise slowly, Evaluate medications/consider consulting pharmacy History of Falls Interventions: Door open when patient unattended, Evaluate medications/consider consulting pharmacy

## 2018-09-11 NOTE — PROGRESS NOTES
0710hrs . [facility] Bedside and Verbal shift change report given to Lilia Dunham (oncoming nurse) by Wilson Dotson (offgoing nurse). Report included the following information SBAR, Kardex, Intake/Output, MAR, Recent Results and Med Rec Status. 0800hrs Flushed PICC double lumen, was flushing but no blood return. 0830hrs Heparinized both lumen. 0930hrs Aspirated heparin after 1 hour but did not get any blood return from both lumen. MD was notified and cathflo was ordered. Pharmacy was notified for delayed administration of vancomycin and zosyn. Administration time will be adjusted as per pharmacist. 
1030hrs Nursing supervisor decloted PICC line with cathflo on red lumen. 1050hrs Nursing supervisor declotted PICC line with cathflo on purple lumen. 1230hrs Double lumen on PICC line were declotted and with good blood return. Notified pharmacist for adjustment of vanco and zosyn due time. 1910hrs . [facility] Bedside and Verbal shift change report given to So (oncoming nurse) by [facility] nurse).  Report included the following information SBAR, Kardex, Intake/Output, MAR, Recent Results and Med Rec Status.

## 2018-09-12 LAB
CREAT SERPL-MCNC: 0.58 MG/DL (ref 0.7–1.3)
DATE LAST DOSE: ABNORMAL
REPORTED DOSE,DOSE: ABNORMAL UNITS
REPORTED DOSE/TIME,TMG: ABNORMAL
VANCOMYCIN TROUGH SERPL-MCNC: 27.4 UG/ML (ref 5–10)

## 2018-09-12 PROCEDURE — 36415 COLL VENOUS BLD VENIPUNCTURE: CPT | Performed by: HOSPITALIST

## 2018-09-12 PROCEDURE — 74011250636 HC RX REV CODE- 250/636: Performed by: INTERNAL MEDICINE

## 2018-09-12 PROCEDURE — 82565 ASSAY OF CREATININE: CPT | Performed by: HOSPITALIST

## 2018-09-12 PROCEDURE — 74011250636 HC RX REV CODE- 250/636: Performed by: HOSPITALIST

## 2018-09-12 PROCEDURE — 74011250637 HC RX REV CODE- 250/637: Performed by: HOSPITALIST

## 2018-09-12 PROCEDURE — 74011000258 HC RX REV CODE- 258: Performed by: INTERNAL MEDICINE

## 2018-09-12 PROCEDURE — 80202 ASSAY OF VANCOMYCIN: CPT | Performed by: HOSPITALIST

## 2018-09-12 RX ADMIN — Medication 10 ML: at 22:00

## 2018-09-12 RX ADMIN — VANCOMYCIN HYDROCHLORIDE 1250 MG: 1 INJECTION, POWDER, LYOPHILIZED, FOR SOLUTION INTRAVENOUS at 13:36

## 2018-09-12 RX ADMIN — PIPERACILLIN SODIUM AND TAZOBACTAM SODIUM 3.38 G: 3; .375 INJECTION, POWDER, LYOPHILIZED, FOR SOLUTION INTRAVENOUS at 04:36

## 2018-09-12 RX ADMIN — Medication 10 ML: at 13:37

## 2018-09-12 RX ADMIN — PIPERACILLIN SODIUM AND TAZOBACTAM SODIUM 3.38 G: 3; .375 INJECTION, POWDER, LYOPHILIZED, FOR SOLUTION INTRAVENOUS at 13:36

## 2018-09-12 RX ADMIN — PIPERACILLIN SODIUM AND TAZOBACTAM SODIUM 3.38 G: 3; .375 INJECTION, POWDER, LYOPHILIZED, FOR SOLUTION INTRAVENOUS at 22:32

## 2018-09-12 RX ADMIN — VANCOMYCIN HYDROCHLORIDE 1250 MG: 1 INJECTION, POWDER, LYOPHILIZED, FOR SOLUTION INTRAVENOUS at 22:33

## 2018-09-12 RX ADMIN — ENOXAPARIN SODIUM 40 MG: 40 INJECTION, SOLUTION INTRAVENOUS; SUBCUTANEOUS at 13:36

## 2018-09-12 RX ADMIN — Medication 10 ML: at 07:32

## 2018-09-12 RX ADMIN — VANCOMYCIN HYDROCHLORIDE 1250 MG: 1 INJECTION, POWDER, LYOPHILIZED, FOR SOLUTION INTRAVENOUS at 04:36

## 2018-09-12 RX ADMIN — Medication 10 ML: at 13:36

## 2018-09-12 RX ADMIN — Medication 1 CAPSULE: at 10:32

## 2018-09-12 NOTE — WOUND CARE
Wound care  Revisited the wound. Wound is almost healed on the let arm amputated wound and left thigh graft donor site. Recommendation: 
Keep the left arm open to air. Place a small amount of Carrasyn gel to both graft site and donor site. Place a pillow rolled under his left arm for comfort. the area.

## 2018-09-12 NOTE — PROGRESS NOTES
Problem: Falls - Risk of 
Goal: *Absence of Falls Document Nicola Palencia Fall Risk and appropriate interventions in the flowsheet. Outcome: Progressing Towards Goal 
Fall Risk Interventions: 
  
 
  
 
Medication Interventions: Teach patient to arise slowly History of Falls Interventions: Door open when patient unattended

## 2018-09-12 NOTE — PROGRESS NOTES
0710hrs . Felipa Manzo Bedside and Verbal shift change report given to Lilia Dunham (oncoming nurse) by Regine Armstrong (offgoing nurse). Report included the following information SBAR, Kardex, Intake/Output, MAR, Recent Results and Med Rec Status. 0928hrs Vanco trough lab result called by [facility] [place] [facility]) of 27.4 and was relayed to [facility]. 1200hrs Wound Care nurse inspected wound on left arm amputation, ordered to leave wound open to air, apply with carassyn gel daily, no need to be wrapped with gauze, adaptic or bandage, place a pillow under to support the area. 1910hrs . Felipa Manzo Bedside and Verbal shift change report given to So (oncoming nurse) by [facility] nurse). Report included the following information SBAR, Kardex, Intake/Output, MAR, Recent Results and Med Rec Status

## 2018-09-12 NOTE — PROGRESS NOTES
Hospitalist Progress Note NAME: Curtis Liu :  1970 MRN:  [id number] Assessment / Plan: 
 
Long term patient transferred from [facility] for completion of antibiotics Update- 
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 
long term IV vancomycin and zosyn through 10/1/2018 ID Dr Aleyda Oconnor will follow along 
 pain controlled with oral meds 
  
Code Status: full Surrogate Decision Maker: Brenden Mondragon cousin 
  
DVT Prophylaxis: lovenox GI Prophylaxis: not indicated 
  
Baseline: independent Subjective: Chief Complaint / Reason for Physician Visit Pt speaks minimal english obtained information directly from patient \"ok\". Discussed with RN events overnight. Review of Systems: 
Symptom Y/N Comments  Symptom Y/N Comments Fever/Chills    Chest Pain n   
Poor Appetite n   Edema Cough    Abdominal Pain n   
Sputum    Joint Pain y   
SOB/AIKEN n   Pruritis/Rash Nausea/vomit n   Tolerating PT/OT y Diarrhea n   Tolerating Diet y Constipation    Other Could NOT obtain due to:   
 
Objective: VITALS:  
Last 24hrs VS reviewed since prior progress note. Most recent are: 
Patient Vitals for the past 24 hrs: 
 Temp Pulse Resp BP SpO2  
18 0731 97.3 °F (36.3 °C) 74 18 148/70 95 % 18 2335 97.8 °F (36.6 °C) 78 18 131/65 100 % 18 1616 98 °F (36.7 °C) 81 16 155/89 99 % Intake/Output Summary (Last 24 hours) at 18 1419 Last data filed at 18 2335 Gross per 24 hour Intake              120 ml Output                0 ml Net              120 ml PHYSICAL EXAM: 
General: Alert, cooperative, no acute distress   
EENT:  EOMI. Anicteric sclerae. MMM Resp:  CTA bilaterally, no wheezing or rales. No accessory muscle use CV:  Regular  rhythm,  No edema GI:  Soft, Non distended, Non tender.  +Bowel sounds Neurologic:  Alert and oriented X 3, normal speech, Psych:   Good insight. Not anxious nor agitated Ext:  Left arm stump in dressing Reviewed most current lab test results and cultures  YES Reviewed most current radiology test results   YES Review and summation of old records today    NO Reviewed patient's current orders and MAR    YES 
PMH/SH reviewed - no change compared to H&P 
________________________________________________________________________ Care Plan discussed with: 
  Comments Patient x Family RN x Care Manager x Consultant Multidiciplinary team rounds were held today with , nursing, pharmacist and clinical coordinator. Patient's plan of care was discussed; medications were reviewed and discharge planning was addressed. ________________________________________________________________________ Total NON critical care TIME:  30  Minutes Total CRITICAL CARE TIME Spent:   Minutes non procedure based Comments >50% of visit spent in counseling and coordination of care    
________________________________________________________________________ Virignia Ojeda MD  
 
Procedures: see electronic medical records for all procedures/Xrays and details which were not copied into this note but were reviewed prior to creation of Plan. LABS: 
I reviewed today's most current labs and imaging studies. Pertinent labs include: No results for input(s): WBC, HGB, HCT, PLT, HGBEXT, HCTEXT, PLTEXT, HGBEXT, HCTEXT, PLTEXT in the last 72 hours. Recent Labs  
   09/12/18 
 0431  09/11/18 
 1303  09/10/18 
 0346 CREA  0.58*  0.95  0.67* Signed: Virginia Ojeda MD

## 2018-09-12 NOTE — PROGRESS NOTES
Followed up with request presented by chaplain Joshua Orellana asking for pastoral visit in 18 with Namibian speaking pt. Found pt awake and alert while laying in bed, introduced self and role. Pulled up a chair and led pt in processing. Pt opened up and engaged in conversation with . Shared details of accident that took place in June which followed with amputation of hand and arm and how this has affected pt. Pt stated that he has had moments of weeping and somehow saw this as cowardly, as well as moments of feeling helpless and despondent. Provided empathic listening and reassuring pt that tears were not a show of weakness. Pt stated that he has been feeling better as of late as he's not in pain, is finding encouragement through his shira, and support of friends and co-workers who have visited as able in the evenings and weekends. 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 This was clearly the pt's main concern voiced through visit. Pt shared that a big interest of his is the possibility of obtaining a prosthetic arm and asked  to look into this and ask medical staff about options. Will plan to follow through with this request and communicate back with pt. Informed Piter Singh who will share with Care Management during morning rounds. Pt identifies as [employer] and shared about changes that took place since 2004 when he gave his life to God. Affirmed shira and provided words of encouragement. Prayed with pt to conclude visit and advised of availability. Will provide a copy of [employer] and Bible in [place]ibian per pt's request. Pt very pleased with care received without any concerns. Shared how tender compassionate care shown from one of the RN's/Techs during a time when pt was feeling discouraged helped lift pt's spirits. ROSE MARIE Still

## 2018-09-12 NOTE — PROGRESS NOTES
Problem: Falls - Risk of 
Goal: *Absence of Falls Document Fara Evens Fall Risk and appropriate interventions in the flowsheet. Outcome: Progressing Towards Goal 
Fall Risk Interventions: 
  
 
  
 
Medication Interventions: Teach patient to arise slowly History of Falls Interventions: Door open when patient unattended, Evaluate medications/consider consulting pharmacy

## 2018-09-13 LAB — CREAT SERPL-MCNC: 0.64 MG/DL (ref 0.7–1.3)

## 2018-09-13 PROCEDURE — 82565 ASSAY OF CREATININE: CPT | Performed by: HOSPITALIST

## 2018-09-13 PROCEDURE — 74011250637 HC RX REV CODE- 250/637: Performed by: EMERGENCY MEDICINE

## 2018-09-13 PROCEDURE — 74011250636 HC RX REV CODE- 250/636: Performed by: INTERNAL MEDICINE

## 2018-09-13 PROCEDURE — 74011000258 HC RX REV CODE- 258: Performed by: INTERNAL MEDICINE

## 2018-09-13 PROCEDURE — C1751 CATH, INF, PER/CENT/MIDLINE: HCPCS

## 2018-09-13 PROCEDURE — 36415 COLL VENOUS BLD VENIPUNCTURE: CPT | Performed by: HOSPITALIST

## 2018-09-13 PROCEDURE — 74011250636 HC RX REV CODE- 250/636: Performed by: HOSPITALIST

## 2018-09-13 PROCEDURE — 74011250637 HC RX REV CODE- 250/637: Performed by: HOSPITALIST

## 2018-09-13 RX ADMIN — Medication 10 ML: at 16:08

## 2018-09-13 RX ADMIN — VANCOMYCIN HYDROCHLORIDE 1250 MG: 1 INJECTION, POWDER, LYOPHILIZED, FOR SOLUTION INTRAVENOUS at 16:07

## 2018-09-13 RX ADMIN — PIPERACILLIN SODIUM AND TAZOBACTAM SODIUM 3.38 G: 3; .375 INJECTION, POWDER, LYOPHILIZED, FOR SOLUTION INTRAVENOUS at 05:43

## 2018-09-13 RX ADMIN — VANCOMYCIN HYDROCHLORIDE 1250 MG: 1 INJECTION, POWDER, LYOPHILIZED, FOR SOLUTION INTRAVENOUS at 21:30

## 2018-09-13 RX ADMIN — PIPERACILLIN SODIUM AND TAZOBACTAM SODIUM 3.38 G: 3; .375 INJECTION, POWDER, LYOPHILIZED, FOR SOLUTION INTRAVENOUS at 16:07

## 2018-09-13 RX ADMIN — PIPERACILLIN SODIUM AND TAZOBACTAM SODIUM 3.38 G: 3; .375 INJECTION, POWDER, LYOPHILIZED, FOR SOLUTION INTRAVENOUS at 21:30

## 2018-09-13 RX ADMIN — HYDROCODONE BITARTRATE AND ACETAMINOPHEN 1 TABLET: 5; 325 TABLET ORAL at 14:02

## 2018-09-13 RX ADMIN — VANCOMYCIN HYDROCHLORIDE 1250 MG: 1 INJECTION, POWDER, LYOPHILIZED, FOR SOLUTION INTRAVENOUS at 05:43

## 2018-09-13 RX ADMIN — ENOXAPARIN SODIUM 40 MG: 40 INJECTION, SOLUTION INTRAVENOUS; SUBCUTANEOUS at 15:48

## 2018-09-13 RX ADMIN — Medication 10 ML: at 09:13

## 2018-09-13 RX ADMIN — Medication 10 ML: at 09:12

## 2018-09-13 RX ADMIN — Medication 1 CAPSULE: at 09:12

## 2018-09-13 RX ADMIN — HYDROCODONE BITARTRATE AND ACETAMINOPHEN 1 TABLET: 5; 325 TABLET ORAL at 21:55

## 2018-09-13 RX ADMIN — Medication 10 ML: at 22:00

## 2018-09-13 RX ADMIN — Medication 10 ML: at 16:07

## 2018-09-13 NOTE — PROGRESS NOTES
2730) Bedside shift change report given to Charlie Vilalr RN (oncoming nurse) by Regine Armstrong RN (offgoing nurse). Report included the following information SBAR, Kardex, MAR, Accordion and Recent Results. [phone]) Discuss with pt, using  [id number], application with  for financial assistance with prosthetic, plan for shower, request for pain medication, and discussion with volunteer. 1413) Pt request shower, plan to start antibiotics after shower. 1920) Bedside shift change report given to YARELIS Alexandra (oncoming nurse) by Charlie Villar RN (offgoing nurse). Report included the following information SBAR, Kardex, MAR, Accordion and Recent Results.

## 2018-09-13 NOTE — PROGRESS NOTES
Bedside shift change report given to me (oncoming nurse) by Amandeep Rodriguez (offgoing nurse). Report included the following information SBAR, Kardex, Intake/Output, MAR and Recent Results.

## 2018-09-13 NOTE — PROGRESS NOTES
Problem: Falls - Risk of 
Goal: *Absence of Falls Document [facility] Fall Risk and appropriate interventions in the flowsheet. Outcome: Progressing Towards Goal 
Fall Risk Interventions: 
  
 
  
 
Medication Interventions: Teach patient to arise slowly History of Falls Interventions: Door open when patient unattended, Evaluate medications/consider consulting pharmacy

## 2018-09-14 LAB — CREAT SERPL-MCNC: 0.66 MG/DL (ref 0.7–1.3)

## 2018-09-14 PROCEDURE — 74011250637 HC RX REV CODE- 250/637: Performed by: HOSPITALIST

## 2018-09-14 PROCEDURE — 74011000258 HC RX REV CODE- 258: Performed by: INTERNAL MEDICINE

## 2018-09-14 PROCEDURE — 74011250636 HC RX REV CODE- 250/636: Performed by: HOSPITALIST

## 2018-09-14 PROCEDURE — 82565 ASSAY OF CREATININE: CPT | Performed by: HOSPITALIST

## 2018-09-14 PROCEDURE — 36415 COLL VENOUS BLD VENIPUNCTURE: CPT | Performed by: HOSPITALIST

## 2018-09-14 PROCEDURE — 74011250636 HC RX REV CODE- 250/636: Performed by: INTERNAL MEDICINE

## 2018-09-14 RX ORDER — SODIUM CHLORIDE 0.9 % (FLUSH) 0.9 %
5-10 SYRINGE (ML) INJECTION
Status: COMPLETED | OUTPATIENT
Start: 2018-09-14 | End: 2018-09-14

## 2018-09-14 RX ADMIN — Medication 10 ML: at 21:00

## 2018-09-14 RX ADMIN — Medication 1 CAPSULE: at 09:34

## 2018-09-14 RX ADMIN — Medication 10 ML: at 13:43

## 2018-09-14 RX ADMIN — VANCOMYCIN HYDROCHLORIDE 1250 MG: 1 INJECTION, POWDER, LYOPHILIZED, FOR SOLUTION INTRAVENOUS at 13:35

## 2018-09-14 RX ADMIN — PIPERACILLIN SODIUM AND TAZOBACTAM SODIUM 3.38 G: 3; .375 INJECTION, POWDER, LYOPHILIZED, FOR SOLUTION INTRAVENOUS at 13:35

## 2018-09-14 RX ADMIN — VANCOMYCIN HYDROCHLORIDE 1250 MG: 1 INJECTION, POWDER, LYOPHILIZED, FOR SOLUTION INTRAVENOUS at 22:19

## 2018-09-14 RX ADMIN — PIPERACILLIN SODIUM AND TAZOBACTAM SODIUM 3.38 G: 3; .375 INJECTION, POWDER, LYOPHILIZED, FOR SOLUTION INTRAVENOUS at 22:19

## 2018-09-14 RX ADMIN — VANCOMYCIN HYDROCHLORIDE 1250 MG: 1 INJECTION, POWDER, LYOPHILIZED, FOR SOLUTION INTRAVENOUS at 04:20

## 2018-09-14 RX ADMIN — ENOXAPARIN SODIUM 40 MG: 40 INJECTION, SOLUTION INTRAVENOUS; SUBCUTANEOUS at 13:35

## 2018-09-14 RX ADMIN — PIPERACILLIN SODIUM AND TAZOBACTAM SODIUM 3.38 G: 3; .375 INJECTION, POWDER, LYOPHILIZED, FOR SOLUTION INTRAVENOUS at 04:20

## 2018-09-14 RX ADMIN — Medication 10 ML: at 05:53

## 2018-09-14 NOTE — PROGRESS NOTES
Problem: Falls - Risk of 
Goal: *Absence of Falls Document Florida Sung Fall Risk and appropriate interventions in the flowsheet. Outcome: Progressing Towards Goal 
Fall Risk Interventions: 
  
 
  
 
Medication Interventions: Teach patient to arise slowly History of Falls Interventions: Evaluate medications/consider consulting pharmacy

## 2018-09-14 NOTE — PROGRESS NOTES
0710hrs . Junius El Bedside and Verbal shift change report given to Lilia Dunham (oncoming nurse) by Neema Avalos (offgoing nurse). Report included the following information SBAR, Kardex, Intake/Output, MAR, Recent Results and Med Rec Status. 1910hrs . Junius El Bedside and Verbal shift change report given to Alla Jha (oncoming nurse) by Lilia Dunham (offgoing nurse). Report included the following information SBAR, Kardex, Intake/Output, MAR, Recent Results, Med Rec Status and Cardiac Rhythm ST.

## 2018-09-14 NOTE — PROGRESS NOTES
Problem: Falls - Risk of 
Goal: *Absence of Falls Document Isi Sullivan Fall Risk and appropriate interventions in the flowsheet. Outcome: Progressing Towards Goal 
Fall Risk Interventions: 
  
 
  
 
Medication Interventions: Teach patient to arise slowly History of Falls Interventions: Door open when patient unattended, Room close to nurse's station

## 2018-09-14 NOTE — PROGRESS NOTES
CM reviewed chart. CM met with pt. CM and pt discussed a referral being made to [address] [place] [address] for the financial assistance for his potential prosthetic arm. Pt also explained once more that his friends and cousins who he lives with reassured he would come back to live with them and told him not to stress about not working right away-they would work together to pay bills. Gerri 37 MSW [facility]

## 2018-09-14 NOTE — PROGRESS NOTES
Nutrition Assessment: 
 
INTERVENTIONS/RECOMMENDATIONS:  
Meals/Snacks: General/healthful diet:  Continue cardiac diet. Supplements:  Continue ensure shakes. ASSESSMENT:  
9/14:  Chart reviewed; no new nutrition dx at this time. Labs stable. 9/7:  Chart reviewed; med noted for osteomyelitis. Minimally speaks english. Pt tolerates a cardiac diet + ensure shakes once daily. No new nutrition dx at this time. Diet Order: Cardiac 
% Eaten:  Patient Vitals for the past 72 hrs: 
 % Diet Eaten 09/12/18 2300 0 % 09/12/18 2045 0 % 09/11/18 2335 0 % 09/11/18 2045 0 % Pertinent Medications: [x] Reviewed []Other: 
Pertinent Labs: [x]Reviewed  []Other: 
Food Allergies: [x]None []Other:    
Last BM:    [x]Active     []Hyperactive  []Hypoactive       [] Absent  BS Skin:    [] Intact   [] Incision  [] Breakdown   []Edema   [x]Other: prosthetic arm Anthropometrics: Height: 5' 4\" (162.6 cm) Weight: 64.5 kg (142 lb 3.2 oz) IBW (%IBW): 59 kg (130 lb) (109.38 %) UBW (%UBW):   (  %) BMI: Body mass index is 24.41 kg/(m^2). This BMI is indicative of: 
[]Underweight   [x]Normal   []Overweight   [] Obesity   [] Extreme Obesity (BMI>40) Last Weight Metrics: 
Weight Loss Metrics 8/30/2018 8/23/2018 8/16/2018 8/16/2018 8/16/2018 7/27/2018 Today's Wt 142 lb 3.2 oz 143 lb - 144 lb - 140 lb BMI 24.41 kg/m2 - 24.55 kg/m2 - 24.72 kg/m2 24.03 kg/m2 Estimated Nutrition Needs (Based on): 1846 Kcals/day (1420 x 1.3 AF) , 72 g (1.2 g/kg) Protein Carbohydrate: At Least 130 g/day  Fluids: 1800 mL/day NUTRITION DIAGNOSES:  
Problem:  Impaired ability to prepare food/meal     
Etiology: related to poor diet, poor compliance, infection Signs/Symptoms: as evidenced by BMI, labs, am   
Previous Nutrition Dx:  [] Resolved  [] Unresolved           [x] Progressing NUTRITION INTERVENTIONS: 
Meals/Snacks: General/healthful diet   Supplements: Commercial supplement Feeding Assistance: Meal setup GOAL:  
PO intake > 75% most meals NUTRITION MONITORING AND EVALUATION Behavioral-Environmental Outcomes: Behavior, Acceptance of assist with eating Food/Nutrient Intake Outcomes: Total energy intake Physical Signs/Symptoms Outcomes: Weight/weight change Previous Goal Met: 
 [] Met              [x] Progressing Towards Goal              [] Not Progressing Towards Goal  
Previous Recommendations: 
 [] Implemented          [] Not Implemented          [x] Not Applicable LEARNING NEEDS (Diet, Food/Nutrient-Drug Interaction):  
 [x] None Identified 
 [] Identified and Education Provided/Documented 
 [] Identified and Pt declined/was not appropriate Cultural, Alevism, OR Ethnic Dietary Needs:  
 [x] None Identified 
 [] Identified and Addressed 
 
 [x] Interdisciplinary Care Plan Reviewed/Documented  
 [x] Discharge Planning:  Continue cardiac diet + ensure shakes [] Participated in Interdisciplinary Rounds NUTRITION RISK:  
 [x] Patient At Nutritional Risk  
 [] Patient Not At Nutritional Risk Zari Delgadillo RD Pager [phone] Weekend Pager [phone]

## 2018-09-14 NOTE — PROGRESS NOTES
Hospitalist Progress Note NAME: Conrad Hernandez :  1970 MRN:  [id number] Assessment / Plan: 
 
Long term patient transferred from [facility] for completion of antibiotics Update- 
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 
long term IV vancomycin and zosyn through 10/1/2018 ID Dr Kenney Nath will follow along 
 pain controlled with oral meds 
  
Code Status: full Surrogate Decision Maker: Brenden Mondragon cousin 
  
DVT Prophylaxis: lovenox GI Prophylaxis: not indicated 
  
Baseline: independent Subjective: Chief Complaint / Reason for Physician Visit Pt speaks minimal english obtained information directly from patient \"ok\". Discussed with RN events overnight. Review of Systems: 
Symptom Y/N Comments  Symptom Y/N Comments Fever/Chills    Chest Pain n   
Poor Appetite n   Edema Cough    Abdominal Pain n   
Sputum    Joint Pain y   
SOB/AIKEN n   Pruritis/Rash Nausea/vomit n   Tolerating PT/OT y Diarrhea n   Tolerating Diet y Constipation    Other Could NOT obtain due to:   
 
Objective: VITALS:  
Last 24hrs VS reviewed since prior progress note. Most recent are: 
Patient Vitals for the past 24 hrs: 
 Temp Pulse Resp BP SpO2  
18 1530 98 °F (36.7 °C) 91 16 114/67 99 % 18 0722 97.9 °F (36.6 °C) 79 18 170/65 100 % 18 0437 99 °F (37.2 °C) 74 19 141/78 98 % 18 1930 98.4 °F (36.9 °C) 95 18 (!) 169/110 100 % Intake/Output Summary (Last 24 hours) at 18 1857 Last data filed at 18 4409 Gross per 24 hour Intake             3250 ml Output                0 ml Net             3250 ml PHYSICAL EXAM: 
General: Alert, cooperative, no acute distress   
EENT:  EOMI. Anicteric sclerae. MMM Resp:  CTA bilaterally, no wheezing or rales. No accessory muscle use CV:  Regular  rhythm,  No edema GI:  Soft, Non distended, Non tender.  +Bowel sounds Neurologic:  Alert and oriented X 3, normal speech,  
 Psych:   Good insight. Not anxious nor agitated Ext:  Left arm stump in dressing Reviewed most current lab test results and cultures  YES Reviewed most current radiology test results   YES Review and summation of old records today    NO Reviewed patient's current orders and MAR    YES 
PMH/SH reviewed - no change compared to H&P 
________________________________________________________________________ Care Plan discussed with: 
  Comments Patient x Family RN x Care Manager x Consultant Multidiciplinary team rounds were held today with , nursing, pharmacist and clinical coordinator. Patient's plan of care was discussed; medications were reviewed and discharge planning was addressed. ________________________________________________________________________ Total NON critical care TIME:  30  Minutes Total CRITICAL CARE TIME Spent:   Minutes non procedure based Comments >50% of visit spent in counseling and coordination of care    
________________________________________________________________________ Juan Jose Rm MD  
 
Procedures: see electronic medical records for all procedures/Xrays and details which were not copied into this note but were reviewed prior to creation of Plan. LABS: 
I reviewed today's most current labs and imaging studies. Pertinent labs include: No results for input(s): WBC, HGB, HCT, PLT, HGBEXT, HCTEXT, PLTEXT, HGBEXT, HCTEXT, PLTEXT in the last 72 hours. Recent Labs  
   09/14/18 
 0419  09/13/18 
 0240  09/12/18 
 0431 CREA  0.66*  0.64*  0.58* Signed: Juan Jose Rm MD

## 2018-09-15 LAB — CREAT SERPL-MCNC: 0.75 MG/DL (ref 0.7–1.3)

## 2018-09-15 PROCEDURE — 74011250637 HC RX REV CODE- 250/637: Performed by: HOSPITALIST

## 2018-09-15 PROCEDURE — 82565 ASSAY OF CREATININE: CPT | Performed by: HOSPITALIST

## 2018-09-15 PROCEDURE — 74011250636 HC RX REV CODE- 250/636: Performed by: HOSPITALIST

## 2018-09-15 PROCEDURE — 36415 COLL VENOUS BLD VENIPUNCTURE: CPT | Performed by: HOSPITALIST

## 2018-09-15 PROCEDURE — 74011000258 HC RX REV CODE- 258: Performed by: INTERNAL MEDICINE

## 2018-09-15 PROCEDURE — 74011250636 HC RX REV CODE- 250/636: Performed by: INTERNAL MEDICINE

## 2018-09-15 PROCEDURE — 74011250637 HC RX REV CODE- 250/637: Performed by: EMERGENCY MEDICINE

## 2018-09-15 RX ADMIN — Medication 10 ML: at 13:00

## 2018-09-15 RX ADMIN — HYDROCODONE BITARTRATE AND ACETAMINOPHEN 1 TABLET: 5; 325 TABLET ORAL at 02:54

## 2018-09-15 RX ADMIN — PIPERACILLIN SODIUM AND TAZOBACTAM SODIUM 3.38 G: 3; .375 INJECTION, POWDER, LYOPHILIZED, FOR SOLUTION INTRAVENOUS at 05:52

## 2018-09-15 RX ADMIN — HYDROCODONE BITARTRATE AND ACETAMINOPHEN 1 TABLET: 5; 325 TABLET ORAL at 20:21

## 2018-09-15 RX ADMIN — VANCOMYCIN HYDROCHLORIDE 1250 MG: 1 INJECTION, POWDER, LYOPHILIZED, FOR SOLUTION INTRAVENOUS at 05:52

## 2018-09-15 RX ADMIN — PIPERACILLIN SODIUM AND TAZOBACTAM SODIUM 3.38 G: 3; .375 INJECTION, POWDER, LYOPHILIZED, FOR SOLUTION INTRAVENOUS at 21:13

## 2018-09-15 RX ADMIN — VANCOMYCIN HYDROCHLORIDE 1250 MG: 1 INJECTION, POWDER, LYOPHILIZED, FOR SOLUTION INTRAVENOUS at 20:15

## 2018-09-15 RX ADMIN — Medication 10 ML: at 20:15

## 2018-09-15 RX ADMIN — Medication 1 CAPSULE: at 10:09

## 2018-09-15 RX ADMIN — PIPERACILLIN SODIUM AND TAZOBACTAM SODIUM 3.38 G: 3; .375 INJECTION, POWDER, LYOPHILIZED, FOR SOLUTION INTRAVENOUS at 12:19

## 2018-09-15 RX ADMIN — Medication 10 ML: at 05:00

## 2018-09-15 RX ADMIN — VANCOMYCIN HYDROCHLORIDE 1250 MG: 1 INJECTION, POWDER, LYOPHILIZED, FOR SOLUTION INTRAVENOUS at 12:19

## 2018-09-15 RX ADMIN — ENOXAPARIN SODIUM 40 MG: 40 INJECTION, SOLUTION INTRAVENOUS; SUBCUTANEOUS at 15:31

## 2018-09-15 NOTE — PROGRESS NOTES
Hospitalist Progress Note NAME: Sherry Morris :  1970 MRN:  [id number] Assessment / Plan: 
 
Long term patient transferred from [address] [place] [address] for completion of antibiotics Update- 
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 
long term IV vancomycin and zosyn through 10/1/2018 ID Dr Pranay Yoon will follow along 
 pain controlled with oral meds 
  
Code Status: full Surrogate Decision Maker: Brenden Mondragon, sujatasin 
  
DVT Prophylaxis: lovenox GI Prophylaxis: not indicated 
  
Baseline: independent Subjective: Chief Complaint / Reason for Physician Visit Pt speaks minimal english obtained information directly from patient \"ok\". Discussed with RN events overnight. Review of Systems: 
Symptom Y/N Comments  Symptom Y/N Comments Fever/Chills    Chest Pain n   
Poor Appetite n   Edema Cough    Abdominal Pain n   
Sputum    Joint Pain y   
SOB/AIKEN n   Pruritis/Rash Nausea/vomit n   Tolerating PT/OT y Diarrhea n   Tolerating Diet y Constipation    Other Could NOT obtain due to:   
 
Objective: VITALS:  
Last 24hrs VS reviewed since prior progress note. Most recent are: 
Patient Vitals for the past 24 hrs: 
 Temp Pulse Resp BP SpO2  
09/15/18 0109 97.5 °F (36.4 °C) 88 16 120/78 100 % 18 1530 98 °F (36.7 °C) 91 16 114/67 99 % Intake/Output Summary (Last 24 hours) at 09/15/18 1109 Last data filed at 09/15/18 0109 Gross per 24 hour Intake                0 ml Output                0 ml Net                0 ml PHYSICAL EXAM: 
General: Alert, cooperative, no acute distress   
EENT:  EOMI. Anicteric sclerae. MMM Resp:  CTA bilaterally, no wheezing or rales. No accessory muscle use CV:  Regular  rhythm,  No edema GI:  Soft, Non distended, Non tender.  +Bowel sounds Neurologic:  Alert and oriented X 3, normal speech, Psych:   Good insight. Not anxious nor agitated Ext:  Left arm stump in dressing Reviewed most current lab test results and cultures  YES Reviewed most current radiology test results   YES Review and summation of old records today    NO Reviewed patient's current orders and MAR    YES 
PMH/SH reviewed - no change compared to H&P 
________________________________________________________________________ Care Plan discussed with: 
  Comments Patient x Family RN x Care Manager x Consultant Multidiciplinary team rounds were held today with , nursing, pharmacist and clinical coordinator. Patient's plan of care was discussed; medications were reviewed and discharge planning was addressed. ________________________________________________________________________ Total NON critical care TIME:  30  Minutes Total CRITICAL CARE TIME Spent:   Minutes non procedure based Comments >50% of visit spent in counseling and coordination of care    
________________________________________________________________________ Denise Dunne MD  
 
Procedures: see electronic medical records for all procedures/Xrays and details which were not copied into this note but were reviewed prior to creation of Plan. LABS: 
I reviewed today's most current labs and imaging studies. Pertinent labs include: No results for input(s): WBC, HGB, HCT, PLT, HGBEXT, HCTEXT, PLTEXT, HGBEXT, HCTEXT, PLTEXT in the last 72 hours. Recent Labs  
   09/15/18 
 0245  09/14/18 
 0419  09/13/18 
 0240 CREA  0.75  0.66*  0.64* Signed: Denise Dunne MD

## 2018-09-15 NOTE — PROGRESS NOTES
Bedside shift change report given to me (oncoming nurse) by Myla Thorpe (offgoing nurse). Report included the following information SBAR, Kardex, Intake/Output, MAR, Accordion, Recent Results and Med Rec Status. 1915 Bedside shift change report given to [facility] RN (oncoming nurse) by me (offgoing nurse). Report included the following information SBAR, Kardex, Intake/Output, MAR, Accordion, Recent Results and Med Rec Status.

## 2018-09-16 LAB — CREAT SERPL-MCNC: 0.72 MG/DL (ref 0.7–1.3)

## 2018-09-16 PROCEDURE — 82565 ASSAY OF CREATININE: CPT | Performed by: HOSPITALIST

## 2018-09-16 PROCEDURE — 74011250636 HC RX REV CODE- 250/636: Performed by: HOSPITALIST

## 2018-09-16 PROCEDURE — 74011250637 HC RX REV CODE- 250/637: Performed by: EMERGENCY MEDICINE

## 2018-09-16 PROCEDURE — 74011250637 HC RX REV CODE- 250/637: Performed by: HOSPITALIST

## 2018-09-16 PROCEDURE — 74011000258 HC RX REV CODE- 258: Performed by: INTERNAL MEDICINE

## 2018-09-16 PROCEDURE — 36592 COLLECT BLOOD FROM PICC: CPT

## 2018-09-16 PROCEDURE — 74011250636 HC RX REV CODE- 250/636: Performed by: INTERNAL MEDICINE

## 2018-09-16 PROCEDURE — 36415 COLL VENOUS BLD VENIPUNCTURE: CPT | Performed by: HOSPITALIST

## 2018-09-16 RX ADMIN — PIPERACILLIN SODIUM AND TAZOBACTAM SODIUM 3.38 G: 3; .375 INJECTION, POWDER, LYOPHILIZED, FOR SOLUTION INTRAVENOUS at 13:54

## 2018-09-16 RX ADMIN — VANCOMYCIN HYDROCHLORIDE 1250 MG: 1 INJECTION, POWDER, LYOPHILIZED, FOR SOLUTION INTRAVENOUS at 05:29

## 2018-09-16 RX ADMIN — Medication 1 CAPSULE: at 10:21

## 2018-09-16 RX ADMIN — Medication 10 ML: at 20:04

## 2018-09-16 RX ADMIN — Medication 10 ML: at 13:00

## 2018-09-16 RX ADMIN — VANCOMYCIN HYDROCHLORIDE 1250 MG: 1 INJECTION, POWDER, LYOPHILIZED, FOR SOLUTION INTRAVENOUS at 20:04

## 2018-09-16 RX ADMIN — VANCOMYCIN HYDROCHLORIDE 1250 MG: 1 INJECTION, POWDER, LYOPHILIZED, FOR SOLUTION INTRAVENOUS at 13:54

## 2018-09-16 RX ADMIN — HYDROCODONE BITARTRATE AND ACETAMINOPHEN 1 TABLET: 5; 325 TABLET ORAL at 14:01

## 2018-09-16 RX ADMIN — Medication 10 ML: at 05:29

## 2018-09-16 RX ADMIN — PIPERACILLIN SODIUM AND TAZOBACTAM SODIUM 3.38 G: 3; .375 INJECTION, POWDER, LYOPHILIZED, FOR SOLUTION INTRAVENOUS at 05:29

## 2018-09-16 RX ADMIN — ENOXAPARIN SODIUM 40 MG: 40 INJECTION, SOLUTION INTRAVENOUS; SUBCUTANEOUS at 13:54

## 2018-09-16 RX ADMIN — PIPERACILLIN SODIUM AND TAZOBACTAM SODIUM 3.38 G: 3; .375 INJECTION, POWDER, LYOPHILIZED, FOR SOLUTION INTRAVENOUS at 21:31

## 2018-09-16 RX ADMIN — Medication 10 ML: at 20:03

## 2018-09-16 NOTE — PROGRESS NOTES
Bedside shift change report given to Gayatri Casillas 1154 (oncoming nurse) by Kathya Greene (offgoing nurse). Report included the following information SBAR, Kardex, Intake/Output, MAR and Recent Results.

## 2018-09-16 NOTE — PROGRESS NOTES
Bedside shift change report given to me (oncoming nurse) by Ko Gramajo RN (offgoing nurse). Report included the following information SBAR, Kardex, Intake/Output, MAR, Accordion, Recent Results and Med Rec Status. 1922  Bedside shift change report given to Ko Gramajo Rn (oncoming nurse) by me (offgoing nurse). Report included the following information SBAR, Kardex, Intake/Output, MAR, Accordion, Recent Results and Med Rec Status.

## 2018-09-17 LAB — CREAT SERPL-MCNC: 0.73 MG/DL (ref 0.7–1.3)

## 2018-09-17 PROCEDURE — 74011250637 HC RX REV CODE- 250/637: Performed by: HOSPITALIST

## 2018-09-17 PROCEDURE — 36415 COLL VENOUS BLD VENIPUNCTURE: CPT | Performed by: HOSPITALIST

## 2018-09-17 PROCEDURE — 74011250636 HC RX REV CODE- 250/636: Performed by: HOSPITALIST

## 2018-09-17 PROCEDURE — 36592 COLLECT BLOOD FROM PICC: CPT

## 2018-09-17 PROCEDURE — 74011000258 HC RX REV CODE- 258: Performed by: INTERNAL MEDICINE

## 2018-09-17 PROCEDURE — 74011250636 HC RX REV CODE- 250/636: Performed by: INTERNAL MEDICINE

## 2018-09-17 PROCEDURE — 82565 ASSAY OF CREATININE: CPT | Performed by: HOSPITALIST

## 2018-09-17 RX ADMIN — Medication 10 ML: at 05:19

## 2018-09-17 RX ADMIN — Medication 10 ML: at 05:18

## 2018-09-17 RX ADMIN — Medication 10 ML: at 12:48

## 2018-09-17 RX ADMIN — ENOXAPARIN SODIUM 40 MG: 40 INJECTION, SOLUTION INTRAVENOUS; SUBCUTANEOUS at 14:25

## 2018-09-17 RX ADMIN — Medication 10 ML: at 21:08

## 2018-09-17 RX ADMIN — VANCOMYCIN HYDROCHLORIDE 1250 MG: 1 INJECTION, POWDER, LYOPHILIZED, FOR SOLUTION INTRAVENOUS at 21:08

## 2018-09-17 RX ADMIN — PIPERACILLIN SODIUM AND TAZOBACTAM SODIUM 3.38 G: 3; .375 INJECTION, POWDER, LYOPHILIZED, FOR SOLUTION INTRAVENOUS at 21:08

## 2018-09-17 RX ADMIN — VANCOMYCIN HYDROCHLORIDE 1250 MG: 1 INJECTION, POWDER, LYOPHILIZED, FOR SOLUTION INTRAVENOUS at 05:19

## 2018-09-17 RX ADMIN — PIPERACILLIN SODIUM AND TAZOBACTAM SODIUM 3.38 G: 3; .375 INJECTION, POWDER, LYOPHILIZED, FOR SOLUTION INTRAVENOUS at 14:16

## 2018-09-17 RX ADMIN — VANCOMYCIN HYDROCHLORIDE 1250 MG: 1 INJECTION, POWDER, LYOPHILIZED, FOR SOLUTION INTRAVENOUS at 12:48

## 2018-09-17 RX ADMIN — PIPERACILLIN SODIUM AND TAZOBACTAM SODIUM 3.38 G: 3; .375 INJECTION, POWDER, LYOPHILIZED, FOR SOLUTION INTRAVENOUS at 05:19

## 2018-09-17 RX ADMIN — Medication 1 CAPSULE: at 10:23

## 2018-09-17 NOTE — PROGRESS NOTES
Problem: Falls - Risk of 
Goal: *Absence of Falls Document Brodie Garsia Fall Risk and appropriate interventions in the flowsheet. Outcome: Progressing Towards Goal 
Fall Risk Interventions: 
  
 
  
 
Medication Interventions: Teach patient to arise slowly History of Falls Interventions: Evaluate medications/consider consulting pharmacy

## 2018-09-17 NOTE — PROGRESS NOTES
Bedside shift change report given to Gayatri Casillas 1154 (oncoming nurse) by Oscar Cristina (offgoing nurse). Report included the following information SBAR, Kardex, Intake/Output, MAR and Recent Results.

## 2018-09-17 NOTE — PROGRESS NOTES
Bedside shift change report given to me (oncoming nurse) by Madelyn Gaytan RN (offgoing nurse). Report included the following information SBAR, Kardex, Intake/Output, MAR, Accordion, Recent Results and Med Rec Status. 1930 Bedside shift change report given to Kade (oncoming nurse) by me (offgoing nurse). Report included the following information SBAR, Kardex, Intake/Output, MAR, Accordion, Recent Results and Med Rec Status.

## 2018-09-17 NOTE — PROGRESS NOTES
Problem: Falls - Risk of 
Goal: *Absence of Falls Document Paolo Jacob Fall Risk and appropriate interventions in the flowsheet. Outcome: Progressing Towards Goal 
Fall Risk Interventions: 
  
 
  
 
Medication Interventions: Teach patient to arise slowly History of Falls Interventions: Evaluate medications/consider consulting pharmacy

## 2018-09-17 NOTE — PROGRESS NOTES
Problem: Falls - Risk of 
Goal: *Absence of Falls Document Melanie AguilarDaly Fall Risk and appropriate interventions in the flowsheet. Outcome: Progressing Towards Goal 
Fall Risk Interventions: 
  
 
  
 
Medication Interventions: Teach patient to arise slowly History of Falls Interventions: Evaluate medications/consider consulting pharmacy

## 2018-09-18 LAB — CREAT SERPL-MCNC: 0.69 MG/DL (ref 0.7–1.3)

## 2018-09-18 PROCEDURE — 36592 COLLECT BLOOD FROM PICC: CPT

## 2018-09-18 PROCEDURE — 74011250636 HC RX REV CODE- 250/636: Performed by: HOSPITALIST

## 2018-09-18 PROCEDURE — 74011250637 HC RX REV CODE- 250/637: Performed by: EMERGENCY MEDICINE

## 2018-09-18 PROCEDURE — 36415 COLL VENOUS BLD VENIPUNCTURE: CPT | Performed by: HOSPITALIST

## 2018-09-18 PROCEDURE — 74011250637 HC RX REV CODE- 250/637: Performed by: HOSPITALIST

## 2018-09-18 PROCEDURE — 74011250636 HC RX REV CODE- 250/636: Performed by: INTERNAL MEDICINE

## 2018-09-18 PROCEDURE — 82565 ASSAY OF CREATININE: CPT | Performed by: HOSPITALIST

## 2018-09-18 PROCEDURE — 74011000258 HC RX REV CODE- 258: Performed by: INTERNAL MEDICINE

## 2018-09-18 RX ADMIN — Medication 10 ML: at 04:39

## 2018-09-18 RX ADMIN — PIPERACILLIN SODIUM AND TAZOBACTAM SODIUM 3.38 G: 3; .375 INJECTION, POWDER, LYOPHILIZED, FOR SOLUTION INTRAVENOUS at 20:43

## 2018-09-18 RX ADMIN — HYDROCODONE BITARTRATE AND ACETAMINOPHEN 1 TABLET: 5; 325 TABLET ORAL at 20:49

## 2018-09-18 RX ADMIN — VANCOMYCIN HYDROCHLORIDE 1250 MG: 1 INJECTION, POWDER, LYOPHILIZED, FOR SOLUTION INTRAVENOUS at 13:58

## 2018-09-18 RX ADMIN — Medication 10 ML: at 14:03

## 2018-09-18 RX ADMIN — Medication 1 CAPSULE: at 09:00

## 2018-09-18 RX ADMIN — VANCOMYCIN HYDROCHLORIDE 1250 MG: 1 INJECTION, POWDER, LYOPHILIZED, FOR SOLUTION INTRAVENOUS at 20:43

## 2018-09-18 RX ADMIN — Medication 10 ML: at 20:43

## 2018-09-18 RX ADMIN — PIPERACILLIN SODIUM AND TAZOBACTAM SODIUM 3.38 G: 3; .375 INJECTION, POWDER, LYOPHILIZED, FOR SOLUTION INTRAVENOUS at 04:38

## 2018-09-18 RX ADMIN — VANCOMYCIN HYDROCHLORIDE 1250 MG: 1 INJECTION, POWDER, LYOPHILIZED, FOR SOLUTION INTRAVENOUS at 04:38

## 2018-09-18 RX ADMIN — ENOXAPARIN SODIUM 40 MG: 40 INJECTION, SOLUTION INTRAVENOUS; SUBCUTANEOUS at 14:04

## 2018-09-18 RX ADMIN — PIPERACILLIN SODIUM AND TAZOBACTAM SODIUM 3.38 G: 3; .375 INJECTION, POWDER, LYOPHILIZED, FOR SOLUTION INTRAVENOUS at 13:58

## 2018-09-18 RX ADMIN — Medication 10 ML: at 04:38

## 2018-09-18 RX ADMIN — HYDROCODONE BITARTRATE AND ACETAMINOPHEN 1 TABLET: 5; 325 TABLET ORAL at 16:02

## 2018-09-18 NOTE — PROGRESS NOTES
Verbal Bedside shift change report given to me (oncoming nurse) by Suzette Nino RN (offgoing nurse). Report included the following information SBAR, Kardex, ED Summary, Intake/Output, MAR, Recent Results and Med Rec Status. Pt received in bed watching TV.

## 2018-09-18 NOTE — PROGRESS NOTES
Hospitalist Progress Note NAME: Whit Dinh :  1970 MRN:  [id number] Assessment / Plan: 
 
Long term patient transferred from [facility] for completion of antibiotics Update- 
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 
long term IV vancomycin and zosyn through 10/1/2018 ID Dr Marycarmen Henderson will follow along 
 pain controlled with oral meds 
  
Code Status: full Surrogate Decision Maker: Brenden Mondragon cousin 
  
DVT Prophylaxis: lovenox GI Prophylaxis: not indicated 
  
Baseline: independent Subjective: Chief Complaint / Reason for Physician Visit Pt speaks minimal english obtained information directly from patient \"ok\". Discussed with RN events overnight. Review of Systems: 
Symptom Y/N Comments  Symptom Y/N Comments Fever/Chills    Chest Pain n   
Poor Appetite n   Edema Cough    Abdominal Pain n   
Sputum    Joint Pain y   
SOB/AIKEN n   Pruritis/Rash Nausea/vomit n   Tolerating PT/OT y Diarrhea n   Tolerating Diet y Constipation    Other Could NOT obtain due to:   
 
Objective: VITALS:  
Last 24hrs VS reviewed since prior progress note. Most recent are: 
Patient Vitals for the past 24 hrs: 
 Temp Pulse Resp BP SpO2  
18 0923 98.2 °F (36.8 °C) 75 16 158/90 99 % 18 0400 97.7 °F (36.5 °C) 72 18 140/60 99 % 18 2000 98.8 °F (37.1 °C) 84 18 147/67 98 % 18 1535 98.2 °F (36.8 °C) 97 18 (!) 159/92 100 % Intake/Output Summary (Last 24 hours) at 18 1245 Last data filed at 18 8497 Gross per 24 hour Intake              500 ml Output                0 ml Net              500 ml PHYSICAL EXAM: 
General: Alert, cooperative, no acute distress   
EENT:  EOMI. Anicteric sclerae. MMM Resp:  CTA bilaterally, no wheezing or rales. No accessory muscle use CV:  Regular  rhythm,  No edema GI:  Soft, Non distended, Non tender.  +Bowel sounds Neurologic:  Alert and oriented X 3, normal speech,  
 Psych:   Good insight. Not anxious nor agitated Ext:  Left arm stump in dressing Reviewed most current lab test results and cultures  YES Reviewed most current radiology test results   YES Review and summation of old records today    NO Reviewed patient's current orders and MAR    YES 
PMH/SH reviewed - no change compared to H&P 
________________________________________________________________________ Care Plan discussed with: 
  Comments Patient x Family RN x Care Manager x Consultant Multidiciplinary team rounds were held today with , nursing, pharmacist and clinical coordinator. Patient's plan of care was discussed; medications were reviewed and discharge planning was addressed. ________________________________________________________________________ Total NON critical care TIME:  30  Minutes Total CRITICAL CARE TIME Spent:   Minutes non procedure based Comments >50% of visit spent in counseling and coordination of care    
________________________________________________________________________ Denise Dunne MD  
 
Procedures: see electronic medical records for all procedures/Xrays and details which were not copied into this note but were reviewed prior to creation of Plan. LABS: 
I reviewed today's most current labs and imaging studies. Pertinent labs include: No results for input(s): WBC, HGB, HCT, PLT, HGBEXT, HCTEXT, PLTEXT, HGBEXT, HCTEXT, PLTEXT in the last 72 hours. Recent Labs  
   09/18/18 
 0435  09/17/18 
 0412  09/16/18 
 0351 CREA  0.69*  0.73  0.72 Signed: Denise Dunne MD

## 2018-09-18 NOTE — PROGRESS NOTES
Bedside and Verbal shift change report given to Sally Alicia (oncoming nurse) by Collette Vargas (offgoing nurse). Report included the following information SBAR, Kardex, MAR, Accordion and Recent Results. Discussed elevated blood pressures in rounds. As we are currently obliged to take pressure readings in the leg (L arm amputee and R arm PICC line), the pressure readings may be less reliable. Will not manage the blood pressures with medication at this time. 2:03 PM paulina change completed. 0530 PM Spoke with Dr. Adan Horn. Patient was wondering if we were going to get him a prosthetic for his arm. Per Dr. Adan Horn, case management is working on this for him. Relayed this information to the patient. Will check status with  in the morning. 1930 Bedside and Verbal shift change report given to Chelle Lu (oncoming nurse) by Sally Alicia (offgoing nurse). Report included the following information SBAR, Kardex, MAR and Accordion.

## 2018-09-18 NOTE — PROGRESS NOTES
4171 Verbal  Bedside shift change report given to 611 Humaira Thomas (oncoming nurse) by me (offgoing nurse). Report included the following information SBAR, Kardex, ED Summary, Intake/Output, MAR, Recent Results and Med Rec Status. pt slept over the night.

## 2018-09-19 LAB
CREAT SERPL-MCNC: 0.62 MG/DL (ref 0.7–1.3)
DATE LAST DOSE: ABNORMAL
REPORTED DOSE,DOSE: ABNORMAL UNITS
REPORTED DOSE/TIME,TMG: ABNORMAL
VANCOMYCIN TROUGH SERPL-MCNC: 22.2 UG/ML (ref 5–10)

## 2018-09-19 PROCEDURE — 74011250636 HC RX REV CODE- 250/636: Performed by: HOSPITALIST

## 2018-09-19 PROCEDURE — 74011250637 HC RX REV CODE- 250/637: Performed by: HOSPITALIST

## 2018-09-19 PROCEDURE — 36415 COLL VENOUS BLD VENIPUNCTURE: CPT | Performed by: HOSPITALIST

## 2018-09-19 PROCEDURE — 80202 ASSAY OF VANCOMYCIN: CPT | Performed by: HOSPITALIST

## 2018-09-19 PROCEDURE — 74011250637 HC RX REV CODE- 250/637: Performed by: EMERGENCY MEDICINE

## 2018-09-19 PROCEDURE — 74011000250 HC RX REV CODE- 250: Performed by: HOSPITALIST

## 2018-09-19 PROCEDURE — 74011250636 HC RX REV CODE- 250/636: Performed by: INTERNAL MEDICINE

## 2018-09-19 PROCEDURE — 74011000258 HC RX REV CODE- 258: Performed by: INTERNAL MEDICINE

## 2018-09-19 PROCEDURE — 82565 ASSAY OF CREATININE: CPT | Performed by: HOSPITALIST

## 2018-09-19 PROCEDURE — 36592 COLLECT BLOOD FROM PICC: CPT

## 2018-09-19 PROCEDURE — 74011250637 HC RX REV CODE- 250/637: Performed by: INTERNAL MEDICINE

## 2018-09-19 RX ORDER — GABAPENTIN 100 MG/1
100 CAPSULE ORAL 3 TIMES DAILY
Status: DISCONTINUED | OUTPATIENT
Start: 2018-09-19 | End: 2018-10-02 | Stop reason: HOSPADM

## 2018-09-19 RX ORDER — LIDOCAINE 40 MG/G
CREAM TOPICAL 2 TIMES DAILY
Status: DISCONTINUED | OUTPATIENT
Start: 2018-09-19 | End: 2018-09-28

## 2018-09-19 RX ADMIN — HYDROCODONE BITARTRATE AND ACETAMINOPHEN 1 TABLET: 5; 325 TABLET ORAL at 01:20

## 2018-09-19 RX ADMIN — GABAPENTIN 100 MG: 100 CAPSULE ORAL at 18:02

## 2018-09-19 RX ADMIN — VANCOMYCIN HYDROCHLORIDE 1000 MG: 1 INJECTION, POWDER, LYOPHILIZED, FOR SOLUTION INTRAVENOUS at 14:52

## 2018-09-19 RX ADMIN — Medication 1 CAPSULE: at 11:03

## 2018-09-19 RX ADMIN — GABAPENTIN 100 MG: 100 CAPSULE ORAL at 21:29

## 2018-09-19 RX ADMIN — Medication 10 ML: at 14:00

## 2018-09-19 RX ADMIN — ACETAMINOPHEN 650 MG: 325 TABLET, FILM COATED ORAL at 20:08

## 2018-09-19 RX ADMIN — HYDROCODONE BITARTRATE AND ACETAMINOPHEN 1 TABLET: 5; 325 TABLET ORAL at 18:02

## 2018-09-19 RX ADMIN — HYDROCODONE BITARTRATE AND ACETAMINOPHEN 1 TABLET: 5; 325 TABLET ORAL at 11:08

## 2018-09-19 RX ADMIN — PIPERACILLIN SODIUM AND TAZOBACTAM SODIUM 3.38 G: 3; .375 INJECTION, POWDER, LYOPHILIZED, FOR SOLUTION INTRAVENOUS at 14:51

## 2018-09-19 RX ADMIN — Medication 10 ML: at 21:30

## 2018-09-19 RX ADMIN — PIPERACILLIN SODIUM AND TAZOBACTAM SODIUM 3.38 G: 3; .375 INJECTION, POWDER, LYOPHILIZED, FOR SOLUTION INTRAVENOUS at 21:33

## 2018-09-19 RX ADMIN — ENOXAPARIN SODIUM 40 MG: 40 INJECTION, SOLUTION INTRAVENOUS; SUBCUTANEOUS at 18:07

## 2018-09-19 RX ADMIN — Medication 30 ML: at 04:35

## 2018-09-19 RX ADMIN — PIPERACILLIN SODIUM AND TAZOBACTAM SODIUM 3.38 G: 3; .375 INJECTION, POWDER, LYOPHILIZED, FOR SOLUTION INTRAVENOUS at 04:38

## 2018-09-19 RX ADMIN — LIDOCAINE: 40 CREAM TOPICAL at 20:08

## 2018-09-19 RX ADMIN — Medication 10 ML: at 07:29

## 2018-09-19 RX ADMIN — VANCOMYCIN HYDROCHLORIDE 1000 MG: 1 INJECTION, POWDER, LYOPHILIZED, FOR SOLUTION INTRAVENOUS at 21:30

## 2018-09-19 RX ADMIN — Medication 10 ML: at 04:35

## 2018-09-19 RX ADMIN — Medication 10 ML: at 18:45

## 2018-09-19 RX ADMIN — VANCOMYCIN HYDROCHLORIDE 1250 MG: 1 INJECTION, POWDER, LYOPHILIZED, FOR SOLUTION INTRAVENOUS at 04:38

## 2018-09-19 NOTE — PROGRESS NOTES
[facility] Dosing Services: Antimicrobial Stewardship Daily Doc Consult for antibiotic dosing of vancomycin by Dr. Cornelius Claudio (continuing from [facility]) Indication: osteomyelitis (confirmed on MRI) Day of Therapy 30 (until 10/1; clarified w/MD) Ht Readings from Last 1 Encounters:  
08/30/18 162.6 cm (64\") Wt Readings from Last 1 Encounters:  
08/30/18 64.5 kg (142 lb 3.2 oz) Vancomycin therapy: 
Current maintenance dose: 1250 (mg) every 8 hours. Dose calculated to approximate a therapeutic trough of 15-20 mcg/mL. Last trough level 22.2 mcg/ml on 9/19 @ 0436, extrapolates to 21.5 mcg/mL Plan for level / Adjustment in Therapy:afebrile, SCr stable, decrease dose to 1G IV Q8H for an expected trough of 17.2 mcg/mL Dose administration notes:   Doses given appropriately as scheduled Other Antimicrobial  
(not dosed by pharmacist) Zosyn 3.375G IV Q8H Cultures 8/16 wound: Pseudomonas (susc pip/tazo) @ Final 
8/16 blood: Negative @ Final 
8/16 wound: Pseudomonas (susc pip/tazo) @ Final  
Serum Creatinine Lab Results Component Value Date/Time Creatinine 0.62 (L) 09/19/2018 04:36 AM  
  
  
Creatinine Clearance Estimated Creatinine Clearance: 119.4 mL/min (based on Cr of 0.62). Temp Temp: 98 °F (36.7 °C) WBC Lab Results Component Value Date/Time WBC 3.9 (L) 08/25/2018 03:22 AM  
 
  
H/H Lab Results Component Value Date/Time HGB 11.5 (L) 08/25/2018 03:22 AM  
 
  
Platelets Lab Results Component Value Date/Time PLATELET 313 24/22/1654 03:22 AM  
 
  
 
 
Pharmacist Latasha Ardon, PHARMD, BCPS Contact: [phone]

## 2018-09-19 NOTE — PROGRESS NOTES
Hospitalist Progress Note NAME: Carey Dakins :  1970 MRN:  [id number] Assessment / Plan: 
 
Long term patient transferred from [facility] for completion of antibiotics Update- 
Abx until 10/1/18 Will have to speak with Dr. Sarita Calvin before discharge if follow up  MRI necessary. 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 
long term IV vancomycin and zosyn through 10/1/2018 ID Dr Sarita Calvin will follow along 
 pain controlled with oral meds Phantom pain LUE Stump 
- will add gabapentin and Lidocaine cream  
  
Code Status: full Surrogate Decision Maker: Brenden Mondragon cousin 
  
DVT Prophylaxis: lovenox GI Prophylaxis: not indicated 
  
Baseline: independent Subjective: Chief Complaint / Reason for Physician Visit Pt speaks minimal english obtained information directly from patient \"doing okay, no new issues, slept well. Some pain at stump site\". Discussed with RN events overnight. Review of Systems: 
Symptom Y/N Comments  Symptom Y/N Comments Fever/Chills    Chest Pain n   
Poor Appetite n   Edema Cough    Abdominal Pain n   
Sputum    Joint Pain y   
SOB/AIKEN n   Pruritis/Rash Nausea/vomit n   Tolerating PT/OT y Diarrhea n   Tolerating Diet y Constipation    Other Could NOT obtain due to:   
 
Objective: VITALS:  
Last 24hrs VS reviewed since prior progress note. Most recent are: 
Patient Vitals for the past 24 hrs: 
 Temp Pulse Resp BP SpO2  
18 0839 98 °F (36.7 °C) 64 16 144/75 100 % 18 0430 97.6 °F (36.4 °C) 66 16 147/70 100 % 18 1946 98.3 °F (36.8 °C) 75 16 (!) 164/97 100 % 18 1557 98.9 °F (37.2 °C) 97 16 (!) 160/96 99 % 18 0923 98.2 °F (36.8 °C) 75 16 158/90 99 % Intake/Output Summary (Last 24 hours) at 18 0500 Last data filed at 18 3896 Gross per 24 hour Intake             1000 ml Output                0 ml Net             1000 ml PHYSICAL EXAM: 
 General: Alert, cooperative, no acute distress   
EENT:  EOMI. Anicteric sclerae. MMM Resp:  CTA bilaterally, no wheezing or rales. No accessory muscle use CV:  Regular  rhythm,  No edema GI:  Soft, Non distended, Non tender.  +Bowel sounds Neurologic:  Alert and oriented X 3, normal speech, Psych:   Good insight. Not anxious nor agitated Ext:  Left arm stump, clear, Wound: red,no drainage, tenderness, warmth noted. Reviewed most current lab test results and cultures  YES Reviewed most current radiology test results   YES Review and summation of old records today    NO Reviewed patient's current orders and MAR    YES 
PMH/SH reviewed - no change compared to H&P 
________________________________________________________________________ Care Plan discussed with: 
  Comments Patient x Family RN x Care Manager x Consultant Multidiciplinary team rounds were held today with , nursing, pharmacist and clinical coordinator. Patient's plan of care was discussed; medications were reviewed and discharge planning was addressed. ________________________________________________________________________ Total NON critical care TIME:  30  Minutes Total CRITICAL CARE TIME Spent:   Minutes non procedure based Comments >50% of visit spent in counseling and coordination of care    
________________________________________________________________________ Mamie Mcmillan MD  
 
Procedures: see electronic medical records for all procedures/Xrays and details which were not copied into this note but were reviewed prior to creation of Plan. LABS: 
I reviewed today's most current labs and imaging studies. Pertinent labs include: No results for input(s): WBC, HGB, HCT, PLT, HGBEXT, HCTEXT, PLTEXT, HGBEXT, HCTEXT, PLTEXT in the last 72 hours. Recent Labs  
   09/19/18 
 0436  09/18/18 
 0435  09/17/18 
 4035 CREA  0.62*  0.69*  0.73  
 
 
 Signed: Filemon Ramirez MD

## 2018-09-19 NOTE — ROUTINE PROCESS
Bedside and Verbal shift change report given to Dimple Villafana RN (oncoming nurse) by Reema Escalante RN (offgoing nurse). Report included the following information SBAR, Kardex, Intake/Output, MAR, Accordion and Recent Results.

## 2018-09-19 NOTE — PROGRESS NOTES
Medicated for pain x 2 overnight in LUE. Patient rated pain 4/10 each time and received complete relief within an hour each time.

## 2018-09-20 LAB — CREAT SERPL-MCNC: 0.62 MG/DL (ref 0.7–1.3)

## 2018-09-20 PROCEDURE — 74011250637 HC RX REV CODE- 250/637: Performed by: HOSPITALIST

## 2018-09-20 PROCEDURE — 36415 COLL VENOUS BLD VENIPUNCTURE: CPT | Performed by: HOSPITALIST

## 2018-09-20 PROCEDURE — 36592 COLLECT BLOOD FROM PICC: CPT

## 2018-09-20 PROCEDURE — 82565 ASSAY OF CREATININE: CPT | Performed by: HOSPITALIST

## 2018-09-20 PROCEDURE — 74011250636 HC RX REV CODE- 250/636: Performed by: INTERNAL MEDICINE

## 2018-09-20 PROCEDURE — 74011000258 HC RX REV CODE- 258: Performed by: INTERNAL MEDICINE

## 2018-09-20 PROCEDURE — 74011250637 HC RX REV CODE- 250/637: Performed by: EMERGENCY MEDICINE

## 2018-09-20 PROCEDURE — 74011250636 HC RX REV CODE- 250/636: Performed by: HOSPITALIST

## 2018-09-20 RX ADMIN — Medication 40 ML: at 04:52

## 2018-09-20 RX ADMIN — PIPERACILLIN SODIUM AND TAZOBACTAM SODIUM 3.38 G: 3; .375 INJECTION, POWDER, LYOPHILIZED, FOR SOLUTION INTRAVENOUS at 14:00

## 2018-09-20 RX ADMIN — HYDROCODONE BITARTRATE AND ACETAMINOPHEN 1 TABLET: 5; 325 TABLET ORAL at 16:20

## 2018-09-20 RX ADMIN — GABAPENTIN 100 MG: 100 CAPSULE ORAL at 10:04

## 2018-09-20 RX ADMIN — Medication 10 ML: at 18:35

## 2018-09-20 RX ADMIN — PIPERACILLIN SODIUM AND TAZOBACTAM SODIUM 3.38 G: 3; .375 INJECTION, POWDER, LYOPHILIZED, FOR SOLUTION INTRAVENOUS at 20:58

## 2018-09-20 RX ADMIN — VANCOMYCIN HYDROCHLORIDE 1000 MG: 1 INJECTION, POWDER, LYOPHILIZED, FOR SOLUTION INTRAVENOUS at 04:51

## 2018-09-20 RX ADMIN — HYDROCODONE BITARTRATE AND ACETAMINOPHEN 1 TABLET: 5; 325 TABLET ORAL at 10:04

## 2018-09-20 RX ADMIN — Medication 10 ML: at 04:51

## 2018-09-20 RX ADMIN — GABAPENTIN 100 MG: 100 CAPSULE ORAL at 16:20

## 2018-09-20 RX ADMIN — HYDROCODONE BITARTRATE AND ACETAMINOPHEN 1 TABLET: 5; 325 TABLET ORAL at 21:43

## 2018-09-20 RX ADMIN — HYDROCODONE BITARTRATE AND ACETAMINOPHEN 1 TABLET: 5; 325 TABLET ORAL at 05:01

## 2018-09-20 RX ADMIN — GABAPENTIN 100 MG: 100 CAPSULE ORAL at 21:43

## 2018-09-20 RX ADMIN — Medication 10 ML: at 20:59

## 2018-09-20 RX ADMIN — VANCOMYCIN HYDROCHLORIDE 1000 MG: 1 INJECTION, POWDER, LYOPHILIZED, FOR SOLUTION INTRAVENOUS at 13:10

## 2018-09-20 RX ADMIN — ENOXAPARIN SODIUM 40 MG: 40 INJECTION, SOLUTION INTRAVENOUS; SUBCUTANEOUS at 13:13

## 2018-09-20 RX ADMIN — HYDROCODONE BITARTRATE AND ACETAMINOPHEN 1 TABLET: 5; 325 TABLET ORAL at 13:21

## 2018-09-20 RX ADMIN — PIPERACILLIN SODIUM AND TAZOBACTAM SODIUM 3.38 G: 3; .375 INJECTION, POWDER, LYOPHILIZED, FOR SOLUTION INTRAVENOUS at 04:51

## 2018-09-20 RX ADMIN — VANCOMYCIN HYDROCHLORIDE 1000 MG: 1 INJECTION, POWDER, LYOPHILIZED, FOR SOLUTION INTRAVENOUS at 20:58

## 2018-09-20 RX ADMIN — LIDOCAINE: 40 CREAM TOPICAL at 18:34

## 2018-09-20 RX ADMIN — LIDOCAINE: 40 CREAM TOPICAL at 10:04

## 2018-09-20 RX ADMIN — Medication 1 CAPSULE: at 10:04

## 2018-09-20 NOTE — PROGRESS NOTES
Verbal Bedside shift change report given to me (oncoming nurse) by Rose Oden (offgoing nurse). Report included the following information SBAR, Kardex, ED Summary, Intake/Output, MAR, Recent Results and Med Rec Status. Pt received in bed alert,awake watching TV. Plan of care discussed care assumed.

## 2018-09-20 NOTE — PROGRESS NOTES
Bedside and Verbal shift change report given to Stephenie Benavides (oncoming nurse) by Edilberto Soliman (offgoing nurse). Report included the following information SBAR, Kardex, MAR, Accordion and Recent Results. 1330- Dr. Leslie Brown at bedside per my request speaking with the patient (in Icelandic) about the quality of his pain. Per Dr. Leslie Brown, the patient is describing phantom pain. Dr Leslie Brown discussed the concept with the patient and spoke with Dr. Trish Garcia about it. Neurontin and lidocaine cream added to formulary. 1945 Bedside and Verbal shift change report given to Edilberto Soliman (oncoming nurse) by Stephenie Benavides (offgoing nurse). Report included the following information SBAR, Kardex, MAR and Accordion.

## 2018-09-20 NOTE — PROGRESS NOTES
Attended interdisciplinary rounds on Med Surg where patient's care was discussed. Chaplain Wiliam Alfredo M.Div.  [facility] Service [phone])

## 2018-09-20 NOTE — PROGRESS NOTES
Hospitalist Progress Note NAME: Brigette Wren :  1970 MRN:  [id number] Assessment / Plan: 
 
Long term patient transferred from [facility] for completion of antibiotics Update- 
Abx until 10/1/18 Will have to speak with Dr. James Jean before discharge if follow up  MRI necessary. 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 
long term IV vancomycin and zosyn through 10/1/2018 ID Dr James Jean will follow along 
 pain controlled with oral meds Phantom pain LUE Stump 
- will add gabapentin and Lidocaine cream  
  
Code Status: full Surrogate Decision Maker: Brenden Mondragon, cousin 
  
DVT Prophylaxis: lovenox GI Prophylaxis: not indicated 
  
Baseline: independent Subjective: Chief Complaint / Reason for Physician Visit Pt speaks minimal english obtained information directly from patient \"doing okay, mild pain at stump site\". Discussed with RN events overnight. Review of Systems: 
Symptom Y/N Comments  Symptom Y/N Comments Fever/Chills    Chest Pain n   
Poor Appetite n   Edema Cough    Abdominal Pain n   
Sputum    Joint Pain y   
SOB/AIKEN n   Pruritis/Rash Nausea/vomit n   Tolerating PT/OT y Diarrhea n   Tolerating Diet y Constipation    Other Could NOT obtain due to:   
 
Objective: VITALS:  
Last 24hrs VS reviewed since prior progress note. Most recent are: 
Patient Vitals for the past 24 hrs: 
 Temp Pulse Resp BP SpO2  
18 0746 97.5 °F (36.4 °C) 64 16 135/67 100 % 18 0445 97.8 °F (36.6 °C) 73 16 141/69 99 % 18 2100 98.1 °F (36.7 °C) 66 16 140/57 99 % 18 1801 98.5 °F (36.9 °C) 79 18 (!) 160/100 99 % 18 0839 98 °F (36.7 °C) 64 16 144/75 100 % Intake/Output Summary (Last 24 hours) at 18 2220 Last data filed at 18 3468 Gross per 24 hour Intake             1150 ml Output                0 ml Net             1150 ml PHYSICAL EXAM: 
General: Alert, cooperative, no acute distress   
 EENT:  EOMI. Anicteric sclerae. MMM Resp:  CTA bilaterally, no wheezing or rales. No accessory muscle use CV:  Regular  rhythm,  No edema GI:  Soft, Non distended, Non tender.  +Bowel sounds Neurologic:  Alert and oriented X 3, normal speech, Psych:   Good insight. Not anxious nor agitated Ext:  Left arm stump, clear, Wound: red,no drainage, tenderness, warmth noted. Reviewed most current lab test results and cultures  YES Reviewed most current radiology test results   YES Review and summation of old records today    NO Reviewed patient's current orders and MAR    YES 
PMH/SH reviewed - no change compared to H&P 
________________________________________________________________________ Care Plan discussed with: 
  Comments Patient x Family RN x Care Manager x Consultant Multidiciplinary team rounds were held today with , nursing, pharmacist and clinical coordinator. Patient's plan of care was discussed; medications were reviewed and discharge planning was addressed. ________________________________________________________________________ Total NON critical care TIME:  30  Minutes Total CRITICAL CARE TIME Spent:   Minutes non procedure based Comments >50% of visit spent in counseling and coordination of care    
________________________________________________________________________ Christian Jacob MD  
 
Procedures: see electronic medical records for all procedures/Xrays and details which were not copied into this note but were reviewed prior to creation of Plan. LABS: 
I reviewed today's most current labs and imaging studies. Pertinent labs include: No results for input(s): WBC, HGB, HCT, PLT, HGBEXT, HCTEXT, PLTEXT, HGBEXT, HCTEXT, PLTEXT in the last 72 hours. Recent Labs  
   09/20/18 
 0450  09/19/18 
 0436  09/18/18 
 0435 CREA  0.62*  0.62*  0.69* Signed: Christian Jacob MD

## 2018-09-21 LAB — CREAT SERPL-MCNC: 0.66 MG/DL (ref 0.7–1.3)

## 2018-09-21 PROCEDURE — 36592 COLLECT BLOOD FROM PICC: CPT

## 2018-09-21 PROCEDURE — 82565 ASSAY OF CREATININE: CPT | Performed by: HOSPITALIST

## 2018-09-21 PROCEDURE — 74011250636 HC RX REV CODE- 250/636: Performed by: HOSPITALIST

## 2018-09-21 PROCEDURE — 74011250637 HC RX REV CODE- 250/637: Performed by: HOSPITALIST

## 2018-09-21 PROCEDURE — 74011250637 HC RX REV CODE- 250/637: Performed by: EMERGENCY MEDICINE

## 2018-09-21 PROCEDURE — 74011250636 HC RX REV CODE- 250/636: Performed by: INTERNAL MEDICINE

## 2018-09-21 PROCEDURE — 36415 COLL VENOUS BLD VENIPUNCTURE: CPT | Performed by: HOSPITALIST

## 2018-09-21 PROCEDURE — 74011000250 HC RX REV CODE- 250: Performed by: HOSPITALIST

## 2018-09-21 PROCEDURE — 74011000258 HC RX REV CODE- 258: Performed by: INTERNAL MEDICINE

## 2018-09-21 RX ADMIN — GABAPENTIN 100 MG: 100 CAPSULE ORAL at 21:28

## 2018-09-21 RX ADMIN — Medication 10 ML: at 21:22

## 2018-09-21 RX ADMIN — PIPERACILLIN SODIUM AND TAZOBACTAM SODIUM 3.38 G: 3; .375 INJECTION, POWDER, LYOPHILIZED, FOR SOLUTION INTRAVENOUS at 13:19

## 2018-09-21 RX ADMIN — PIPERACILLIN SODIUM AND TAZOBACTAM SODIUM 3.38 G: 3; .375 INJECTION, POWDER, LYOPHILIZED, FOR SOLUTION INTRAVENOUS at 21:24

## 2018-09-21 RX ADMIN — Medication 1 CAPSULE: at 09:17

## 2018-09-21 RX ADMIN — PIPERACILLIN SODIUM AND TAZOBACTAM SODIUM 3.38 G: 3; .375 INJECTION, POWDER, LYOPHILIZED, FOR SOLUTION INTRAVENOUS at 04:43

## 2018-09-21 RX ADMIN — Medication 10 ML: at 13:00

## 2018-09-21 RX ADMIN — LIDOCAINE: 40 CREAM TOPICAL at 19:00

## 2018-09-21 RX ADMIN — GABAPENTIN 100 MG: 100 CAPSULE ORAL at 16:00

## 2018-09-21 RX ADMIN — Medication 10 ML: at 04:49

## 2018-09-21 RX ADMIN — ENOXAPARIN SODIUM 40 MG: 40 INJECTION, SOLUTION INTRAVENOUS; SUBCUTANEOUS at 13:20

## 2018-09-21 RX ADMIN — GABAPENTIN 100 MG: 100 CAPSULE ORAL at 09:17

## 2018-09-21 RX ADMIN — VANCOMYCIN HYDROCHLORIDE 1000 MG: 1 INJECTION, POWDER, LYOPHILIZED, FOR SOLUTION INTRAVENOUS at 04:43

## 2018-09-21 RX ADMIN — HYDROCODONE BITARTRATE AND ACETAMINOPHEN 1 TABLET: 5; 325 TABLET ORAL at 09:21

## 2018-09-21 RX ADMIN — HYDROCODONE BITARTRATE AND ACETAMINOPHEN 1 TABLET: 5; 325 TABLET ORAL at 20:05

## 2018-09-21 RX ADMIN — HYDROCODONE BITARTRATE AND ACETAMINOPHEN 1 TABLET: 5; 325 TABLET ORAL at 13:17

## 2018-09-21 RX ADMIN — VANCOMYCIN HYDROCHLORIDE 1000 MG: 1 INJECTION, POWDER, LYOPHILIZED, FOR SOLUTION INTRAVENOUS at 13:19

## 2018-09-21 RX ADMIN — LIDOCAINE: 40 CREAM TOPICAL at 09:18

## 2018-09-21 RX ADMIN — VANCOMYCIN HYDROCHLORIDE 1000 MG: 1 INJECTION, POWDER, LYOPHILIZED, FOR SOLUTION INTRAVENOUS at 21:24

## 2018-09-21 NOTE — PROGRESS NOTES
Hospitalist Progress Note NAME: Jason Ortega :  1970 MRN:  [id number] Assessment / Plan: 
 
Long term patient transferred from [facility] for completion of antibiotics Update- 
Abx until 10/1/18 Will have to speak with Dr. Sigrid Mendieta before discharge if follow up  MRI necessary. 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 
long term IV vancomycin and zosyn through 10/1/2018 ID Dr Sigrid Mendieta will follow along 
 pain controlled with oral meds Phantom pain LUE Stump 
- started gabapentin and Lidocaine cream  
- pain improved 
  
Code Status: full Surrogate Decision Maker: Brenden Mondragon cousin 
  
DVT Prophylaxis: lovenox GI Prophylaxis: not indicated 
  
Baseline: independent Subjective: Chief Complaint / Reason for Physician Visit Pt speaks minimal english obtained information directly from patient \"doing okay, mild pain at stump site\". Discussed with RN events overnight. Review of Systems: 
Symptom Y/N Comments  Symptom Y/N Comments Fever/Chills    Chest Pain n   
Poor Appetite n   Edema Cough    Abdominal Pain n   
Sputum    Joint Pain y   
SOB/AIKEN n   Pruritis/Rash Nausea/vomit n   Tolerating PT/OT y Diarrhea n   Tolerating Diet y Constipation    Other Could NOT obtain due to:   
 
Objective: VITALS:  
Last 24hrs VS reviewed since prior progress note. Most recent are: 
Patient Vitals for the past 24 hrs: 
 Temp Pulse Resp BP SpO2  
18 0430 97.5 °F (36.4 °C) 70 18 152/65 99 % 18 1945 98.4 °F (36.9 °C) 72 18 125/59 99 % No intake or output data in the 24 hours ending 18 0567 PHYSICAL EXAM: 
General: Alert, cooperative, no acute distress   
EENT:  EOMI. Anicteric sclerae. MMM Resp:  CTA bilaterally, no wheezing or rales. CV:  Regular  rhythm,  No edema GI:  Soft, Non distended, Non tender.  +Bowel sounds Neurologic:  Alert and oriented X 3, normal speech, Psych:   Good insight. Not anxious nor agitated Ext:  Left arm stump, clear, Wound: red,no drainage, tenderness, warmth noted. Reviewed most current lab test results and cultures  YES Reviewed most current radiology test results   YES Review and summation of old records today    NO Reviewed patient's current orders and MAR    YES 
PMH/SH reviewed - no change compared to H&P 
________________________________________________________________________ Care Plan discussed with: 
  Comments Patient x Family RN x Care Manager x Consultant Multidiciplinary team rounds were held today with , nursing, pharmacist and clinical coordinator. Patient's plan of care was discussed; medications were reviewed and discharge planning was addressed. ________________________________________________________________________ Total NON critical care TIME:  30  Minutes Total CRITICAL CARE TIME Spent:   Minutes non procedure based Comments >50% of visit spent in counseling and coordination of care    
________________________________________________________________________ Judy Acevedo MD  
 
Procedures: see electronic medical records for all procedures/Xrays and details which were not copied into this note but were reviewed prior to creation of Plan. LABS: 
I reviewed today's most current labs and imaging studies. Pertinent labs include: No results for input(s): WBC, HGB, HCT, PLT, HGBEXT, HCTEXT, PLTEXT, HGBEXT, HCTEXT, PLTEXT in the last 72 hours. Recent Labs  
   09/21/18 
 0440  09/20/18 
 0450  09/19/18 
 8486 CREA  0.66*  0.62*  0.62* Signed: Judy Acevedo MD

## 2018-09-21 NOTE — PROGRESS NOTES
Verbal Bedside shift change report given to Simon Cross (oncoming nurse) by me (offgoing nurse). Report included the following information SBAR, Kardex, ED Summary, Intake/Output, MAR, Recent Results and Med Rec Status. pt slept over night. Medicated for pain x 1.

## 2018-09-21 NOTE — PROGRESS NOTES
FollowUp Nutrition Assessment: 
  
INTERVENTIONS/RECOMMENDATIONS:  
Meals/Snacks: General/healthful diet:  Continue cardiac diet. Enc po. Supplements: Commercial supplement:  Continue ensure shake once daily. 
  
ASSESSMENT:  
9/21:  Chart reviewed; med noted for osteomyelitis. Minimally speaks english. Pt tolerates a cardiac diet + ensure shakes once daily. No new nutrition dx at this time. 
  
Diet Order: Cardiac 
% Eaten:  Patient Vitals for the past 72 hrs: 
  % Diet Eaten 0919/18 0058 60 %  
09/20/18 2011 50 %  
  
Pertinent Medications: [x] Reviewed []Other: 
Pertinent Labs: [x]Reviewed  []Other: No new labs for review Food Allergies: [x]None []Other:    
Last BM:                     [x]Active     []Hyperactive  []Hypoactive       [] Absent  BS Skin:               [] Intact                        [] Incision                     [x] Breakdown                                     []Edema                       []Other: 
  
Anthropometrics: Height: 5' 4\" (162.6 cm)              Weight: 64.5 kg (142 lb 3.2 oz) IBW (%IBW): 59 kg (130 lb) (109.38 %)                  UBW (%UBW):   (  %) BMI: Body mass index is 24.41 kg/(m^2). This BMI is indicative of: 
[]Underweight   []Normal   []Overweight   [] Obesity   [] Extreme Obesity (BMI>40) Last Weight Metrics: 
Weight Loss Metrics 9/21/2018 8/23/2018 8/16/2018 8/16/2018 8/16/2018 7/27/2018 Today's Wt 142 lb 3.2 oz 143 lb - 144 lb - 140 lb BMI 24.41 kg/m2 - 24.55 kg/m2 - 24.72 kg/m2 24.03 kg/m2  
  
  
Estimated Nutrition Needs (Based on): 1846 Kcals/day (1420 x 1.3 AF) , 72 g (1.2 g/kg) Protein Carbohydrate: At Least 130 g/day  Fluids: 1900 mL/day 
  
NUTRITION DIAGNOSES:  
Problem:  Impaired ability to prepare food/meal     
Etiology: related to poor diet, poor compliance, infection Signs/Symptoms: as evidenced by BMI, labs, am   
 Previous Nutrition Dx:  [] Resolved              [] Unresolved           [x] Progressing 
  
NUTRITION INTERVENTIONS: 
Meals/Snacks: General/healthful diet   Supplements: Commercial supplement Feeding Assistance: Meal setup        
   
GOAL:  
PO intake > 75% most meals. Previous goal not met. 
  
NUTRITION MONITORING AND EVALUATION Behavioral-Environmental Outcomes: Behavior, Acceptance of assist with eating Food/Nutrient Intake Outcomes: Total energy intake Physical Signs/Symptoms Outcomes: Weight/weight change 
  
Previous Goal Met: 
 [] Met              [] Progressing Towards Goal              [x] Not Progressing Towards Goal  
Previous Recommendations: 
 [] Implemented          [] Not Implemented          [x] Not Applicable 
  
LEARNING NEEDS (Diet, Food/Nutrient-Drug Interaction):  
 [x] None Identified 
 [] Identified and Education Provided/Documented 
 [] Identified and Pt declined/was not appropriate 
   
Cultural, Congregational, OR Ethnic Dietary Needs:  
 [x] None Identified 
 [] Identified and Addressed 
  
 [x] Interdisciplinary Care Plan Reviewed/Documented  
 [x] Discharge Planning:  Continue cardiac diet as ordered. [x] Participated in Interdisciplinary Rounds 
  
NUTRITION RISK:  
 [x] Patient At Nutritional Risk                
 [] Patient Not At Nutritional Risk Ventura Womack, PhD, [address], [address] [place] [address] , [address] [employer]

## 2018-09-21 NOTE — PROGRESS NOTES
IDR: Patient treatment plan discuss. Patient possible discharge 10/02/2018. Patient financial application for the prosthetic arm has been received from [facility]. Isabelle DURAN to assist patient  with application. Patient going to [facility] today with [facility] [employer] [facility] [place]. Melissa Gonzalez [facility] CM.

## 2018-09-22 LAB — CREAT SERPL-MCNC: 0.63 MG/DL (ref 0.7–1.3)

## 2018-09-22 PROCEDURE — 74011000258 HC RX REV CODE- 258: Performed by: INTERNAL MEDICINE

## 2018-09-22 PROCEDURE — 74011250637 HC RX REV CODE- 250/637: Performed by: HOSPITALIST

## 2018-09-22 PROCEDURE — 36415 COLL VENOUS BLD VENIPUNCTURE: CPT | Performed by: HOSPITALIST

## 2018-09-22 PROCEDURE — 74011250637 HC RX REV CODE- 250/637: Performed by: INTERNAL MEDICINE

## 2018-09-22 PROCEDURE — 36592 COLLECT BLOOD FROM PICC: CPT

## 2018-09-22 PROCEDURE — 82565 ASSAY OF CREATININE: CPT | Performed by: HOSPITALIST

## 2018-09-22 PROCEDURE — 74011250637 HC RX REV CODE- 250/637: Performed by: EMERGENCY MEDICINE

## 2018-09-22 PROCEDURE — 74011250636 HC RX REV CODE- 250/636: Performed by: INTERNAL MEDICINE

## 2018-09-22 PROCEDURE — 74011250636 HC RX REV CODE- 250/636: Performed by: HOSPITALIST

## 2018-09-22 RX ADMIN — Medication 10 ML: at 13:44

## 2018-09-22 RX ADMIN — Medication 10 ML: at 05:08

## 2018-09-22 RX ADMIN — PIPERACILLIN SODIUM AND TAZOBACTAM SODIUM 3.38 G: 3; .375 INJECTION, POWDER, LYOPHILIZED, FOR SOLUTION INTRAVENOUS at 13:44

## 2018-09-22 RX ADMIN — HYDROCODONE BITARTRATE AND ACETAMINOPHEN 1 TABLET: 5; 325 TABLET ORAL at 19:55

## 2018-09-22 RX ADMIN — Medication 30 ML: at 05:03

## 2018-09-22 RX ADMIN — VANCOMYCIN HYDROCHLORIDE 1000 MG: 1 INJECTION, POWDER, LYOPHILIZED, FOR SOLUTION INTRAVENOUS at 05:07

## 2018-09-22 RX ADMIN — VANCOMYCIN HYDROCHLORIDE 1000 MG: 1 INJECTION, POWDER, LYOPHILIZED, FOR SOLUTION INTRAVENOUS at 21:30

## 2018-09-22 RX ADMIN — VANCOMYCIN HYDROCHLORIDE 1000 MG: 1 INJECTION, POWDER, LYOPHILIZED, FOR SOLUTION INTRAVENOUS at 13:44

## 2018-09-22 RX ADMIN — LIDOCAINE: 40 CREAM TOPICAL at 08:46

## 2018-09-22 RX ADMIN — GABAPENTIN 100 MG: 100 CAPSULE ORAL at 08:46

## 2018-09-22 RX ADMIN — Medication 10 ML: at 21:28

## 2018-09-22 RX ADMIN — ACETAMINOPHEN 650 MG: 325 TABLET, FILM COATED ORAL at 17:07

## 2018-09-22 RX ADMIN — HYDROCODONE BITARTRATE AND ACETAMINOPHEN 1 TABLET: 5; 325 TABLET ORAL at 04:56

## 2018-09-22 RX ADMIN — HYDROCODONE BITARTRATE AND ACETAMINOPHEN 1 TABLET: 5; 325 TABLET ORAL at 13:51

## 2018-09-22 RX ADMIN — GABAPENTIN 100 MG: 100 CAPSULE ORAL at 21:30

## 2018-09-22 RX ADMIN — ENOXAPARIN SODIUM 40 MG: 40 INJECTION, SOLUTION INTRAVENOUS; SUBCUTANEOUS at 13:43

## 2018-09-22 RX ADMIN — Medication 1 CAPSULE: at 08:46

## 2018-09-22 RX ADMIN — GABAPENTIN 100 MG: 100 CAPSULE ORAL at 17:07

## 2018-09-22 RX ADMIN — PIPERACILLIN SODIUM AND TAZOBACTAM SODIUM 3.38 G: 3; .375 INJECTION, POWDER, LYOPHILIZED, FOR SOLUTION INTRAVENOUS at 21:30

## 2018-09-22 RX ADMIN — PIPERACILLIN SODIUM AND TAZOBACTAM SODIUM 3.38 G: 3; .375 INJECTION, POWDER, LYOPHILIZED, FOR SOLUTION INTRAVENOUS at 05:08

## 2018-09-22 RX ADMIN — Medication 20 ML: at 05:08

## 2018-09-22 RX ADMIN — LIDOCAINE: 40 CREAM TOPICAL at 17:07

## 2018-09-22 NOTE — PROGRESS NOTES
Hospitalist Progress Note NAME: Eloy Dc :  1970 MRN:  [id number] Assessment / Plan: 
 
Long term patient transferred from [facility] for completion of antibiotics Update- 
Abx until 10/1/18 Will have to speak with Dr. Tee Zhang before discharge if follow up  MRI necessary. 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 
pain LUE Stump 
long term IV vancomycin and zosyn through 10/1/2018 ID Dr Tee Zhang will follow along 
 pain controlled with oral meds 
 
  
Code Status: full Surrogate Decision Maker: Brenden Mondragon cousin 
  
DVT Prophylaxis: lovenox GI Prophylaxis: not indicated 
  
Baseline: independent Subjective: Chief Complaint / Reason for Physician Visit Pt speaks minimal english obtained information directly from patient \"doing okay\". Discussed with RN events overnight. Review of Systems: 
Symptom Y/N Comments  Symptom Y/N Comments Fever/Chills    Chest Pain Poor Appetite    Edema Cough    Abdominal Pain Sputum    Joint Pain y   
SOB/AIKEN    Pruritis/Rash Nausea/vomit    Tolerating PT/OT y Diarrhea    Tolerating Diet y Constipation    Other Could NOT obtain due to:   
 
Objective: VITALS:  
Last 24hrs VS reviewed since prior progress note. Most recent are: 
Patient Vitals for the past 24 hrs: 
 Temp Pulse Resp BP SpO2  
18 0812 98 °F (36.7 °C) 72 16 135/76 100 % 18 0456 98.1 °F (36.7 °C) 73 16 145/79 100 % 18 2121 97.9 °F (36.6 °C) 80 16 140/62 100 % 18 1630 98.4 °F (36.9 °C) 78 18 147/87 100 % Intake/Output Summary (Last 24 hours) at 18 1000 Last data filed at 18 5112 Gross per 24 hour Intake             1800 ml Output                0 ml Net             1800 ml PHYSICAL EXAM: 
General: Alert, cooperative, no acute distress   
Resp:  CTA bilaterally Neurologic:  Alert and oriented X 3, normal speech Psych:   Good insight. Not anxious nor agitated Ext:  Left arm stump, in dressing Reviewed most current lab test results and cultures  YES Reviewed most current radiology test results   YES Review and summation of old records today    NO Reviewed patient's current orders and MAR    YES 
PMH/SH reviewed - no change compared to H&P 
________________________________________________________________________ Care Plan discussed with: 
  Comments Patient x Family RN x Care Manager Consultant Multidiciplinary team rounds were held today with , nursing, pharmacist and clinical coordinator. Patient's plan of care was discussed; medications were reviewed and discharge planning was addressed. ________________________________________________________________________ Total NON critical care TIME:  15  Minutes Total CRITICAL CARE TIME Spent:   Minutes non procedure based Comments >50% of visit spent in counseling and coordination of care    
________________________________________________________________________ Serene Enciso MD  
 
Procedures: see electronic medical records for all procedures/Xrays and details which were not copied into this note but were reviewed prior to creation of Plan. LABS: 
I reviewed today's most current labs and imaging studies. Pertinent labs include: No results for input(s): WBC, HGB, HCT, PLT, HGBEXT, HCTEXT, PLTEXT, HGBEXT, HCTEXT, PLTEXT in the last 72 hours. Recent Labs  
   09/22/18 
 0502  09/21/18 
 0440  09/20/18 
 0450 CREA  0.63*  0.66*  0.62* Signed: Serene Enciso MD

## 2018-09-22 NOTE — PROGRESS NOTES
[facility] Dosing Services: Antimicrobial Stewardship Daily Doc Consult for antibiotic dosing of vancomycin by Dr. Fredrick Garcia (continuing from [facility]) Indication: osteomyelitis (confirmed on MRI) Day of Therapy 33 (until 10/1; clarified w/MD) Ht Readings from Last 1 Encounters:  
08/30/18 162.6 cm (64\") Wt Readings from Last 1 Encounters:  
08/30/18 64.5 kg (142 lb 3.2 oz) FYI: daily SCr ordered (per policy for M7J dosing and concomitant pip/tazo) Vancomycin therapy: 
Current maintenance dose: 1000 (mg) every 8 hours. Dose calculated to approximate a therapeutic trough of 15-20 mcg/mL. Last trough level 22.2 mcg/ml on 9/19 @ 0436, extrapolates to 21.5 mcg/mL Plan for level / Adjustment in Therapy:afebrile, SCr stable, continue current dosing regimen and plan for repeat level on 9/26 Dose administration notes:   Doses given appropriately as scheduled Other Antimicrobial  
(not dosed by pharmacist) Zosyn 3.375G IV Q8H Cultures 8/16 wound: Pseudomonas (susc pip/tazo) @ Final 
8/16 blood: Negative @ Final 
8/16 wound: Pseudomonas (susc pip/tazo) @ Final  
Serum Creatinine Lab Results Component Value Date/Time Creatinine 0.63 (L) 09/22/2018 05:02 AM  
  
  
Creatinine Clearance Estimated Creatinine Clearance: 117.5 mL/min (based on Cr of 0.63). Temp Temp: 98 °F (36.7 °C) WBC Lab Results Component Value Date/Time WBC 3.9 (L) 08/25/2018 03:22 AM  
 
  
H/H Lab Results Component Value Date/Time HGB 11.5 (L) 08/25/2018 03:22 AM  
 
  
Platelets Lab Results Component Value Date/Time PLATELET 326 75/75/8874 03:22 AM  
 
  
 
 
Pharmacist REGINA Soto [facility] Contact: [phone]

## 2018-09-22 NOTE — PROGRESS NOTES
Bedside shift change report given to me (oncoming nurse) by Josue Hightower RN(offgoing nurse). Report included the following information SBAR, Kardex, Intake/Output, MAR and Recent Results.

## 2018-09-22 NOTE — PROGRESS NOTES
0745 Bedside and Verbal shift change report given to Fiorella Vieira RN (oncoming nurse) by Mirella Walls RN (offgoing nurse). Report included the following information SBAR, [place], MAR, Accordion and Recent Results.

## 2018-09-23 LAB — CREAT SERPL-MCNC: 0.59 MG/DL (ref 0.7–1.3)

## 2018-09-23 PROCEDURE — 74011250637 HC RX REV CODE- 250/637: Performed by: EMERGENCY MEDICINE

## 2018-09-23 PROCEDURE — 74011250636 HC RX REV CODE- 250/636: Performed by: HOSPITALIST

## 2018-09-23 PROCEDURE — 74011250636 HC RX REV CODE- 250/636: Performed by: INTERNAL MEDICINE

## 2018-09-23 PROCEDURE — 82565 ASSAY OF CREATININE: CPT | Performed by: HOSPITALIST

## 2018-09-23 PROCEDURE — 74011250637 HC RX REV CODE- 250/637: Performed by: HOSPITALIST

## 2018-09-23 PROCEDURE — 36415 COLL VENOUS BLD VENIPUNCTURE: CPT | Performed by: HOSPITALIST

## 2018-09-23 PROCEDURE — 74011000258 HC RX REV CODE- 258: Performed by: INTERNAL MEDICINE

## 2018-09-23 RX ADMIN — HYDROCODONE BITARTRATE AND ACETAMINOPHEN 1 TABLET: 5; 325 TABLET ORAL at 20:41

## 2018-09-23 RX ADMIN — PIPERACILLIN SODIUM AND TAZOBACTAM SODIUM 3.38 G: 3; .375 INJECTION, POWDER, LYOPHILIZED, FOR SOLUTION INTRAVENOUS at 04:19

## 2018-09-23 RX ADMIN — GABAPENTIN 100 MG: 100 CAPSULE ORAL at 21:23

## 2018-09-23 RX ADMIN — Medication 10 ML: at 04:19

## 2018-09-23 RX ADMIN — LIDOCAINE: 40 CREAM TOPICAL at 18:00

## 2018-09-23 RX ADMIN — Medication 10 ML: at 21:19

## 2018-09-23 RX ADMIN — VANCOMYCIN HYDROCHLORIDE 1000 MG: 1 INJECTION, POWDER, LYOPHILIZED, FOR SOLUTION INTRAVENOUS at 14:13

## 2018-09-23 RX ADMIN — VANCOMYCIN HYDROCHLORIDE 1000 MG: 1 INJECTION, POWDER, LYOPHILIZED, FOR SOLUTION INTRAVENOUS at 21:27

## 2018-09-23 RX ADMIN — HYDROCODONE BITARTRATE AND ACETAMINOPHEN 1 TABLET: 5; 325 TABLET ORAL at 09:17

## 2018-09-23 RX ADMIN — GABAPENTIN 100 MG: 100 CAPSULE ORAL at 17:37

## 2018-09-23 RX ADMIN — VANCOMYCIN HYDROCHLORIDE 1000 MG: 1 INJECTION, POWDER, LYOPHILIZED, FOR SOLUTION INTRAVENOUS at 04:18

## 2018-09-23 RX ADMIN — PIPERACILLIN SODIUM AND TAZOBACTAM SODIUM 3.38 G: 3; .375 INJECTION, POWDER, LYOPHILIZED, FOR SOLUTION INTRAVENOUS at 14:13

## 2018-09-23 RX ADMIN — HYDROCODONE BITARTRATE AND ACETAMINOPHEN 1 TABLET: 5; 325 TABLET ORAL at 04:18

## 2018-09-23 RX ADMIN — LIDOCAINE: 40 CREAM TOPICAL at 09:00

## 2018-09-23 RX ADMIN — Medication 10 ML: at 13:00

## 2018-09-23 RX ADMIN — HYDROCODONE BITARTRATE AND ACETAMINOPHEN 1 TABLET: 5; 325 TABLET ORAL at 14:21

## 2018-09-23 RX ADMIN — Medication 10 ML: at 21:24

## 2018-09-23 RX ADMIN — Medication 1 CAPSULE: at 09:17

## 2018-09-23 RX ADMIN — GABAPENTIN 100 MG: 100 CAPSULE ORAL at 09:17

## 2018-09-23 RX ADMIN — PIPERACILLIN SODIUM AND TAZOBACTAM SODIUM 3.38 G: 3; .375 INJECTION, POWDER, LYOPHILIZED, FOR SOLUTION INTRAVENOUS at 21:27

## 2018-09-23 RX ADMIN — ENOXAPARIN SODIUM 40 MG: 40 INJECTION, SOLUTION INTRAVENOUS; SUBCUTANEOUS at 14:14

## 2018-09-23 NOTE — PROGRESS NOTES
Bedside shift change report given to me (oncoming nurse) by Sarita Granados RN (offgoing nurse). Report included the following information SBAR, Kardex, Intake/Output, MAR and Recent Results.  code [id number]

## 2018-09-23 NOTE — PROGRESS NOTES
Bedside shift change report given to me (oncoming nurse) by Lori Hernandez (offgoing nurse). Report included the following information SBAR, Kardex, Intake/Output, MAR, Accordion, Recent Results and Med Rec Status. 1920 Bedside shift change report given to Lori Hernandez (oncoming nurse) by me (offgoing nurse). Report included the following information SBAR, Kardex, Intake/Output, MAR, Accordion, Recent Results and Med Rec Status.

## 2018-09-23 NOTE — PROGRESS NOTES
Hospitalist Progress Note NAME: Bibiana Quijano :  1970 MRN:  [id number] Assessment / Plan: 
 
Long term patient transferred from [facility] for completion of antibiotics Update- 
Abx until 10/1/18 Will have to speak with Dr. Farideh Ponce before discharge if follow up  MRI necessary. 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 
pain LUE Stump 
long term IV vancomycin and zosyn through 10/1/2018 ID Dr Farideh Ponce will follow along 
 pain controlled with oral meds 
 
  
Code Status: full Surrogate Decision Maker: Brenden Mondragon cousin 
  
DVT Prophylaxis: lovenox GI Prophylaxis: not indicated 
  
Baseline: independent Subjective: Chief Complaint / Reason for Physician Visit Pt speaks minimal english obtained information directly from patient \"doing okay\". Discussed with RN events overnight. Review of Systems: 
Symptom Y/N Comments  Symptom Y/N Comments Fever/Chills    Chest Pain Poor Appetite    Edema Cough    Abdominal Pain Sputum    Joint Pain y   
SOB/AIKEN    Pruritis/Rash Nausea/vomit    Tolerating PT/OT y Diarrhea    Tolerating Diet y Constipation    Other Could NOT obtain due to:   
 
Objective: VITALS:  
Last 24hrs VS reviewed since prior progress note. Most recent are: 
Patient Vitals for the past 24 hrs: 
 Temp Pulse Resp BP SpO2  
18 0417 98.5 °F (36.9 °C) 76 19 162/76 99 % 18 1949 98 °F (36.7 °C) 74 16 (!) 168/93 99 % 18 1544 98.1 °F (36.7 °C) 88 18 145/82 99 % Intake/Output Summary (Last 24 hours) at 18 3186 Last data filed at 18 0867 Gross per 24 hour Intake             1630 ml Output                0 ml Net             1630 ml PHYSICAL EXAM: 
General: Alert, cooperative, no acute distress   
Resp:  CTA bilaterally Neurologic:  Alert and oriented X 3, normal speech Psych:   Good insight. Not anxious nor agitated Ext:  Left arm stump, in dressing Reviewed most current lab test results and cultures  YES Reviewed most current radiology test results   YES Review and summation of old records today    NO Reviewed patient's current orders and MAR    YES 
PMH/SH reviewed - no change compared to H&P 
________________________________________________________________________ Care Plan discussed with: 
  Comments Patient x Family RN x Care Manager Consultant Multidiciplinary team rounds were held today with , nursing, pharmacist and clinical coordinator. Patient's plan of care was discussed; medications were reviewed and discharge planning was addressed. ________________________________________________________________________ Total NON critical care TIME:  15  Minutes Total CRITICAL CARE TIME Spent:   Minutes non procedure based Comments >50% of visit spent in counseling and coordination of care    
________________________________________________________________________ Lyn Schulte MD  
 
Procedures: see electronic medical records for all procedures/Xrays and details which were not copied into this note but were reviewed prior to creation of Plan. LABS: 
I reviewed today's most current labs and imaging studies. Pertinent labs include: No results for input(s): WBC, HGB, HCT, PLT, HGBEXT, HCTEXT, PLTEXT, HGBEXT, HCTEXT, PLTEXT in the last 72 hours. Recent Labs  
   09/23/18 
 0413  09/22/18 
 0502  09/21/18 
 0440 CREA  0.59*  0.63*  0.66* Signed: Lyn Schulte MD

## 2018-09-23 NOTE — PROGRESS NOTES
[facility] Dosing Services: Antimicrobial Stewardship Consult for antibiotic dosing of vancomycin by Dr. Dayo Galan (continuing from [facility]) Indication: osteomyelitis (confirmed on MRI) Day of Therapy 34 (until 10/1; clarified w/MD) Ht Readings from Last 1 Encounters:  
08/30/18 162.6 cm (64\") Wt Readings from Last 1 Encounters:  
08/30/18 64.5 kg (142 lb 3.2 oz) FYI: daily SCr ordered (per policy for A8Q dosing and concomitant pip/tazo) Vancomycin therapy: 
Continue current maintenance dose: 1000 (mg) every 8 hours Dose calculated to approximate a therapeutic trough of 15-20 mcg/mL. Last trough level 22.2 mcg/ml on 9/19 @ 0436, extrapolates to 21.5 mcg/mL Plan for level / Adjustment in Therapy:afebrile, SCr stable, continue current dosing regimen and plan for repeat level on 9/26 Dose administration notes:   Doses given appropriately as scheduled Other Antimicrobial  
(not dosed by pharmacist) Zosyn 3.375G IV Q8H Cultures 8/16 wound: Pseudomonas (susc pip/tazo) @ Final 
8/16 blood: Negative @ Final 
8/16 wound: Pseudomonas (susc pip/tazo) @ Final  
Serum Creatinine Lab Results Component Value Date/Time Creatinine 0.59 (L) 09/23/2018 04:13 AM  
  
  
Creatinine Clearance Estimated Creatinine Clearance: 117.5 mL/min (based on Cr of 0.63). Temp Temp: 98 °F (36.7 °C) WBC Lab Results Component Value Date/Time WBC 3.9 (L) 08/25/2018 03:22 AM  
 
  
H/H Lab Results Component Value Date/Time HGB 11.5 (L) 08/25/2018 03:22 AM  
 
  
Platelets Lab Results Component Value Date/Time PLATELET 943 21/01/5717 03:22 AM  
 
  
 
 
Pharmacist REGINA Nair [facility] Contact: [phone]

## 2018-09-23 NOTE — PROGRESS NOTES
Problem: Falls - Risk of 
Goal: *Absence of Falls Document Maite Kappa Fall Risk and appropriate interventions in the flowsheet. Outcome: Progressing Towards Goal 
Fall Risk Interventions: 
  
 
  
 
Medication Interventions: Assess postural VS orthostatic hypotension, Bed/chair exit alarm, Patient to call before getting OOB, Teach patient to arise slowly History of Falls Interventions: Evaluate medications/consider consulting pharmacy

## 2018-09-24 LAB — CREAT SERPL-MCNC: 0.6 MG/DL (ref 0.7–1.3)

## 2018-09-24 PROCEDURE — 74011000250 HC RX REV CODE- 250: Performed by: HOSPITALIST

## 2018-09-24 PROCEDURE — 74011250637 HC RX REV CODE- 250/637: Performed by: EMERGENCY MEDICINE

## 2018-09-24 PROCEDURE — 74011000258 HC RX REV CODE- 258: Performed by: INTERNAL MEDICINE

## 2018-09-24 PROCEDURE — 36415 COLL VENOUS BLD VENIPUNCTURE: CPT | Performed by: HOSPITALIST

## 2018-09-24 PROCEDURE — 74011250636 HC RX REV CODE- 250/636: Performed by: INTERNAL MEDICINE

## 2018-09-24 PROCEDURE — 74011250636 HC RX REV CODE- 250/636: Performed by: HOSPITALIST

## 2018-09-24 PROCEDURE — 74011250637 HC RX REV CODE- 250/637: Performed by: HOSPITALIST

## 2018-09-24 PROCEDURE — 82565 ASSAY OF CREATININE: CPT | Performed by: HOSPITALIST

## 2018-09-24 RX ADMIN — VANCOMYCIN HYDROCHLORIDE 1000 MG: 1 INJECTION, POWDER, LYOPHILIZED, FOR SOLUTION INTRAVENOUS at 21:19

## 2018-09-24 RX ADMIN — GABAPENTIN 100 MG: 100 CAPSULE ORAL at 21:19

## 2018-09-24 RX ADMIN — HYDROCODONE BITARTRATE AND ACETAMINOPHEN 1 TABLET: 5; 325 TABLET ORAL at 09:29

## 2018-09-24 RX ADMIN — PIPERACILLIN SODIUM AND TAZOBACTAM SODIUM 3.38 G: 3; .375 INJECTION, POWDER, LYOPHILIZED, FOR SOLUTION INTRAVENOUS at 21:19

## 2018-09-24 RX ADMIN — GABAPENTIN 100 MG: 100 CAPSULE ORAL at 15:37

## 2018-09-24 RX ADMIN — PIPERACILLIN SODIUM AND TAZOBACTAM SODIUM 3.38 G: 3; .375 INJECTION, POWDER, LYOPHILIZED, FOR SOLUTION INTRAVENOUS at 13:53

## 2018-09-24 RX ADMIN — Medication 10 ML: at 13:00

## 2018-09-24 RX ADMIN — Medication 10 ML: at 21:20

## 2018-09-24 RX ADMIN — ENOXAPARIN SODIUM 40 MG: 40 INJECTION, SOLUTION INTRAVENOUS; SUBCUTANEOUS at 13:54

## 2018-09-24 RX ADMIN — LIDOCAINE: 40 CREAM TOPICAL at 09:25

## 2018-09-24 RX ADMIN — GABAPENTIN 100 MG: 100 CAPSULE ORAL at 09:24

## 2018-09-24 RX ADMIN — PIPERACILLIN SODIUM AND TAZOBACTAM SODIUM 3.38 G: 3; .375 INJECTION, POWDER, LYOPHILIZED, FOR SOLUTION INTRAVENOUS at 05:29

## 2018-09-24 RX ADMIN — LIDOCAINE: 40 CREAM TOPICAL at 21:20

## 2018-09-24 RX ADMIN — VANCOMYCIN HYDROCHLORIDE 1000 MG: 1 INJECTION, POWDER, LYOPHILIZED, FOR SOLUTION INTRAVENOUS at 13:54

## 2018-09-24 RX ADMIN — HYDROCODONE BITARTRATE AND ACETAMINOPHEN 1 TABLET: 5; 325 TABLET ORAL at 23:56

## 2018-09-24 RX ADMIN — Medication 10 ML: at 05:29

## 2018-09-24 RX ADMIN — Medication 1 CAPSULE: at 09:24

## 2018-09-24 RX ADMIN — HYDROCODONE BITARTRATE AND ACETAMINOPHEN 1 TABLET: 5; 325 TABLET ORAL at 14:05

## 2018-09-24 RX ADMIN — VANCOMYCIN HYDROCHLORIDE 1000 MG: 1 INJECTION, POWDER, LYOPHILIZED, FOR SOLUTION INTRAVENOUS at 05:29

## 2018-09-24 RX ADMIN — HYDROCODONE BITARTRATE AND ACETAMINOPHEN 1 TABLET: 5; 325 TABLET ORAL at 19:41

## 2018-09-24 NOTE — PROGRESS NOTES
Problem: Falls - Risk of 
Goal: *Absence of Falls Document Melanie [facility] Fall Risk and appropriate interventions in the flowsheet. Outcome: Progressing Towards Goal 
Fall Risk Interventions: 
  
 
  
 
Medication Interventions: Evaluate medications/consider consulting pharmacy History of Falls Interventions: Evaluate medications/consider consulting pharmacy

## 2018-09-24 NOTE — PROGRESS NOTES
IDR: Patient treatment care discuss. Last day of antibioc 10/1/2018. Patient can't read or write assistant filling out the application  For financial application for [facility] patient gave answers verbally. [facility] sign [facility] Melissa Gonzalez [facility] CM.

## 2018-09-24 NOTE — PROGRESS NOTES
Bedside shift change report given to me (oncoming nurse) by Demarcus Corona (offgoing nurse). Report included the following information SBAR, Kardex, Intake/Output, MAR, Accordion, Recent Results and Med Rec Status. 1920 Bedside shift change report given to soco murillo (oncoming nurse) by me (offgoing nurse). Report included the following information SBAR, Kardex, Intake/Output, MAR, Accordion, Recent Results and Med Rec Status.

## 2018-09-24 NOTE — PROGRESS NOTES
Hospitalist Progress Note NAME: Joseph Glover :  1970 MRN:  [id number] Assessment / Plan: 
 
Long term patient transferred from [facility] for completion of antibiotics Update- 
Abx until 10/1/18 Will have to speak with Dr. Nena Carmen before discharge if follow up  MRI necessary. 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 
pain LUE Stump 
long term IV vancomycin and zosyn through 10/1/2018 ID Dr Nena Carmen will follow along 
 pain controlled with oral meds 
 
  
Code Status: full Surrogate Decision Maker: Brenden Mondragon, sujatasin 
  
DVT Prophylaxis: lovenox GI Prophylaxis: not indicated 
  
Baseline: independent Subjective: Chief Complaint / Reason for Physician Visit Pt speaks minimal english obtained information directly from patient \"doing okay\". Discussed with RN events overnight. Review of Systems: 
Symptom Y/N Comments  Symptom Y/N Comments Fever/Chills    Chest Pain Poor Appetite    Edema Cough    Abdominal Pain Sputum    Joint Pain y   
SOB/AIKEN    Pruritis/Rash Nausea/vomit    Tolerating PT/OT y Diarrhea    Tolerating Diet y Constipation    Other Could NOT obtain due to:   
 
Objective: VITALS:  
Last 24hrs VS reviewed since prior progress note. Most recent are: 
Patient Vitals for the past 24 hrs: 
 Temp Pulse Resp BP SpO2  
18 0748 97.6 °F (36.4 °C) 66 18 149/82 98 %  
18 0529 97.9 °F (36.6 °C) 69 17 136/70 100 % 18 2042 98.3 °F (36.8 °C) 73 17 147/68 99 % 18 1630 97.8 °F (36.6 °C) 90 18 (!) 196/99 97 % Intake/Output Summary (Last 24 hours) at 18 7676 Last data filed at 18 204 Gross per 24 hour Intake              250 ml Output                0 ml Net              250 ml PHYSICAL EXAM: 
General: Alert, cooperative, no acute distress   
Resp:  CTA bilaterally Neurologic:  Alert and oriented X 3, normal speech Psych:   Good insight. Not anxious nor agitated Ext:  Left arm stump, in dressing Reviewed most current lab test results and cultures  YES Reviewed most current radiology test results   YES Review and summation of old records today    NO Reviewed patient's current orders and MAR    YES 
PMH/SH reviewed - no change compared to H&P 
________________________________________________________________________ Care Plan discussed with: 
  Comments Patient x Family RN x Care Manager Consultant Multidiciplinary team rounds were held today with , nursing, pharmacist and clinical coordinator. Patient's plan of care was discussed; medications were reviewed and discharge planning was addressed. ________________________________________________________________________ Total NON critical care TIME:  15  Minutes Total CRITICAL CARE TIME Spent:   Minutes non procedure based Comments >50% of visit spent in counseling and coordination of care    
________________________________________________________________________ Josep Andre MD  
 
Procedures: see electronic medical records for all procedures/Xrays and details which were not copied into this note but were reviewed prior to creation of Plan. LABS: 
I reviewed today's most current labs and imaging studies. Pertinent labs include: No results for input(s): WBC, HGB, HCT, PLT, HGBEXT, HCTEXT, PLTEXT, HGBEXT, HCTEXT, PLTEXT in the last 72 hours. Recent Labs  
   09/24/18 
 0528  09/23/18 
 0413  09/22/18 
 0502 CREA  0.60*  0.59*  0.63* Signed: Josep Andre MD

## 2018-09-25 LAB
CREAT SERPL-MCNC: 0.6 MG/DL (ref 0.7–1.3)
ERYTHROCYTE [DISTWIDTH] IN BLOOD BY AUTOMATED COUNT: 12.3 % (ref 11.5–14.5)
HCT VFR BLD AUTO: 36 % (ref 36.6–50.3)
HGB BLD-MCNC: 12.1 G/DL (ref 12.1–17)
MCH RBC QN AUTO: 29.4 PG (ref 26–34)
MCHC RBC AUTO-ENTMCNC: 33.6 G/DL (ref 30–36.5)
MCV RBC AUTO: 87.6 FL (ref 80–99)
NRBC # BLD: 0 K/UL (ref 0–0.01)
NRBC BLD-RTO: 0 PER 100 WBC
PLATELET # BLD AUTO: 255 K/UL (ref 150–400)
PMV BLD AUTO: 9.2 FL (ref 8.9–12.9)
RBC # BLD AUTO: 4.11 M/UL (ref 4.1–5.7)
WBC # BLD AUTO: 4.7 K/UL (ref 4.1–11.1)

## 2018-09-25 PROCEDURE — 74011250637 HC RX REV CODE- 250/637: Performed by: HOSPITALIST

## 2018-09-25 PROCEDURE — 85027 COMPLETE CBC AUTOMATED: CPT | Performed by: HOSPITALIST

## 2018-09-25 PROCEDURE — 74011250637 HC RX REV CODE- 250/637: Performed by: EMERGENCY MEDICINE

## 2018-09-25 PROCEDURE — 90471 IMMUNIZATION ADMIN: CPT

## 2018-09-25 PROCEDURE — 82565 ASSAY OF CREATININE: CPT | Performed by: HOSPITALIST

## 2018-09-25 PROCEDURE — 74011250636 HC RX REV CODE- 250/636: Performed by: INTERNAL MEDICINE

## 2018-09-25 PROCEDURE — 36415 COLL VENOUS BLD VENIPUNCTURE: CPT | Performed by: HOSPITALIST

## 2018-09-25 PROCEDURE — 74011250636 HC RX REV CODE- 250/636: Performed by: HOSPITALIST

## 2018-09-25 PROCEDURE — 74011000258 HC RX REV CODE- 258: Performed by: INTERNAL MEDICINE

## 2018-09-25 PROCEDURE — 90686 IIV4 VACC NO PRSV 0.5 ML IM: CPT | Performed by: HOSPITALIST

## 2018-09-25 PROCEDURE — 36592 COLLECT BLOOD FROM PICC: CPT

## 2018-09-25 RX ADMIN — PIPERACILLIN SODIUM AND TAZOBACTAM SODIUM 3.38 G: 3; .375 INJECTION, POWDER, LYOPHILIZED, FOR SOLUTION INTRAVENOUS at 04:47

## 2018-09-25 RX ADMIN — Medication 10 ML: at 04:49

## 2018-09-25 RX ADMIN — Medication 10 ML: at 13:34

## 2018-09-25 RX ADMIN — PIPERACILLIN SODIUM AND TAZOBACTAM SODIUM 3.38 G: 3; .375 INJECTION, POWDER, LYOPHILIZED, FOR SOLUTION INTRAVENOUS at 22:04

## 2018-09-25 RX ADMIN — PIPERACILLIN SODIUM AND TAZOBACTAM SODIUM 3.38 G: 3; .375 INJECTION, POWDER, LYOPHILIZED, FOR SOLUTION INTRAVENOUS at 13:33

## 2018-09-25 RX ADMIN — Medication 1 CAPSULE: at 08:50

## 2018-09-25 RX ADMIN — LIDOCAINE: 40 CREAM TOPICAL at 17:03

## 2018-09-25 RX ADMIN — Medication 10 ML: at 22:11

## 2018-09-25 RX ADMIN — VANCOMYCIN HYDROCHLORIDE 1000 MG: 1 INJECTION, POWDER, LYOPHILIZED, FOR SOLUTION INTRAVENOUS at 22:04

## 2018-09-25 RX ADMIN — GABAPENTIN 100 MG: 100 CAPSULE ORAL at 16:13

## 2018-09-25 RX ADMIN — ENOXAPARIN SODIUM 40 MG: 40 INJECTION, SOLUTION INTRAVENOUS; SUBCUTANEOUS at 13:33

## 2018-09-25 RX ADMIN — VANCOMYCIN HYDROCHLORIDE 1000 MG: 1 INJECTION, POWDER, LYOPHILIZED, FOR SOLUTION INTRAVENOUS at 13:33

## 2018-09-25 RX ADMIN — INFLUENZA VIRUS VACCINE 0.5 ML: 15; 15; 15; 15 SUSPENSION INTRAMUSCULAR at 11:43

## 2018-09-25 RX ADMIN — HYDROCODONE BITARTRATE AND ACETAMINOPHEN 1 TABLET: 5; 325 TABLET ORAL at 11:34

## 2018-09-25 RX ADMIN — GABAPENTIN 100 MG: 100 CAPSULE ORAL at 22:05

## 2018-09-25 RX ADMIN — LIDOCAINE: 40 CREAM TOPICAL at 08:50

## 2018-09-25 RX ADMIN — GABAPENTIN 100 MG: 100 CAPSULE ORAL at 08:50

## 2018-09-25 RX ADMIN — VANCOMYCIN HYDROCHLORIDE 1000 MG: 1 INJECTION, POWDER, LYOPHILIZED, FOR SOLUTION INTRAVENOUS at 04:47

## 2018-09-25 NOTE — PROGRESS NOTES
1925: Bedside shift change report given to [place]  (oncoming nurse) by Conrad Ham (offgoing nurse). Report included the following information SBAR, Kardex, ED Summary, Intake/Output, MAR and Recent Results. 0715: Bedside shift change report given to Lilia Dunham (oncoming nurse) by [facility] (offgoing nurse). Report included the following information SBAR, Kardex, ED Summary, Intake/Output, MAR and Recent Results.

## 2018-09-25 NOTE — PROGRESS NOTES
0710hrs . Farshad Inch Bedside and Verbal shift change report given to Lilia Dunham (oncoming nurse) by Gino Hassan (offgoing nurse). Report included the following information SBAR, Kardex, Intake/Output, MAR, Recent Results and Med Rec Status. 1120hrs Flu shot screening done with patient via blue phone , patient consented to get the flu vaccine. 1130hrs PICC line dressing changed via sterile procedure. 1910hrs Bedside and Verbal shift change report given to Wiliam Soriano (oncoming nurse) by Lilia Dunham (offgoing nurse). Report included the following information SBAR, Kardex, Intake/Output, MAR, Recent Results and Med Rec Status.

## 2018-09-25 NOTE — PROGRESS NOTES
Hospitalist Progress Note NAME: Rachell Handley :  1970 MRN:  [id number] Assessment / Plan: 
 
Long term patient transferred from [facility] for completion of antibiotics. Update- 
Abx until 10/1/18 Will have to speak with Dr. Licha Schneider before discharge if follow up  MRI necessary. 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 
pain LUE Stump 
long term IV vancomycin and zosyn through 10/1/2018 ID Dr Licha Schneider will follow along 
 pain controlled with oral meds 
 
  
Code Status: full Surrogate Decision Maker: Brenden Mondragon, sujatasin 
  
DVT Prophylaxis: lovenox GI Prophylaxis: not indicated 
  
Baseline: independent Subjective: Chief Complaint / Reason for Physician Visit Pt speaks minimal english obtained information directly from patient \"doing okay\". Discussed with RN events overnight. Review of Systems: 
Symptom Y/N Comments  Symptom Y/N Comments Fever/Chills    Chest Pain Poor Appetite    Edema Cough    Abdominal Pain Sputum    Joint Pain y   
SOB/AIKEN    Pruritis/Rash Nausea/vomit    Tolerating PT/OT y Diarrhea    Tolerating Diet y Constipation    Other Could NOT obtain due to:   
 
Objective: VITALS:  
Last 24hrs VS reviewed since prior progress note. Most recent are: 
Patient Vitals for the past 24 hrs: 
 Temp Pulse Resp BP SpO2  
18 0737 97.6 °F (36.4 °C) 64 16 143/72 100 % 18 0019 97.9 °F (36.6 °C) 70 18 150/71 100 % 18 1937 97.5 °F (36.4 °C) 78 18 (!) 165/91 99 % 18 1534 98 °F (36.7 °C) 72 18 121/62 98 % Intake/Output Summary (Last 24 hours) at 18 0900 Last data filed at 18 2407 Gross per 24 hour Intake             1350 ml Output                0 ml Net             1350 ml PHYSICAL EXAM: 
General: Alert, cooperative, no acute distress   
Resp:  CTA bilaterally Neurologic:  Alert and oriented X 3, normal speech Psych:   Good insight. Not anxious nor agitated Ext:  Left arm stump, in dressing Reviewed most current lab test results and cultures  YES Reviewed most current radiology test results   YES Review and summation of old records today    NO Reviewed patient's current orders and MAR    YES 
PMH/SH reviewed - no change compared to H&P 
________________________________________________________________________ Care Plan discussed with: 
  Comments Patient x Family RN x Care Manager Consultant Multidiciplinary team rounds were held today with , nursing, pharmacist and clinical coordinator. Patient's plan of care was discussed; medications were reviewed and discharge planning was addressed. ________________________________________________________________________ Total NON critical care TIME:  15  Minutes Total CRITICAL CARE TIME Spent:   Minutes non procedure based Comments >50% of visit spent in counseling and coordination of care    
________________________________________________________________________ Britni Gomez MD  
 
Procedures: see electronic medical records for all procedures/Xrays and details which were not copied into this note but were reviewed prior to creation of Plan. LABS: 
I reviewed today's most current labs and imaging studies. Pertinent labs include: 
Recent Labs  
   09/25/18 
 0445 WBC  4.7 HGB  12.1 HCT  36.0*  
PLT  255 Recent Labs  
   09/25/18 
 0445  09/24/18 
 0528  09/23/18 
 0413 CREA  0.60*  0.60*  0.59* Signed: Britni Gomez MD

## 2018-09-26 ENCOUNTER — TELEPHONE (OUTPATIENT)
Dept: FAMILY MEDICINE CLINIC | Age: 48
End: 2018-09-26

## 2018-09-26 ENCOUNTER — PATIENT OUTREACH (OUTPATIENT)
Dept: FAMILY MEDICINE CLINIC | Age: 48
End: 2018-09-26

## 2018-09-26 LAB
CREAT SERPL-MCNC: 0.67 MG/DL (ref 0.7–1.3)
DATE LAST DOSE: ABNORMAL
REPORTED DOSE,DOSE: ABNORMAL UNITS
REPORTED DOSE/TIME,TMG: ABNORMAL
VANCOMYCIN TROUGH SERPL-MCNC: 13.6 UG/ML (ref 5–10)

## 2018-09-26 PROCEDURE — 74011000258 HC RX REV CODE- 258: Performed by: INTERNAL MEDICINE

## 2018-09-26 PROCEDURE — C1751 CATH, INF, PER/CENT/MIDLINE: HCPCS

## 2018-09-26 PROCEDURE — 80202 ASSAY OF VANCOMYCIN: CPT | Performed by: HOSPITALIST

## 2018-09-26 PROCEDURE — 74011250637 HC RX REV CODE- 250/637: Performed by: HOSPITALIST

## 2018-09-26 PROCEDURE — 74011250636 HC RX REV CODE- 250/636: Performed by: INTERNAL MEDICINE

## 2018-09-26 PROCEDURE — 74011250636 HC RX REV CODE- 250/636: Performed by: HOSPITALIST

## 2018-09-26 PROCEDURE — 82565 ASSAY OF CREATININE: CPT | Performed by: HOSPITALIST

## 2018-09-26 PROCEDURE — 36415 COLL VENOUS BLD VENIPUNCTURE: CPT | Performed by: HOSPITALIST

## 2018-09-26 PROCEDURE — 74011250637 HC RX REV CODE- 250/637: Performed by: EMERGENCY MEDICINE

## 2018-09-26 PROCEDURE — 74011000250 HC RX REV CODE- 250: Performed by: HOSPITALIST

## 2018-09-26 RX ADMIN — LIDOCAINE: 40 CREAM TOPICAL at 18:20

## 2018-09-26 RX ADMIN — PIPERACILLIN SODIUM AND TAZOBACTAM SODIUM 3.38 G: 3; .375 INJECTION, POWDER, LYOPHILIZED, FOR SOLUTION INTRAVENOUS at 04:48

## 2018-09-26 RX ADMIN — GABAPENTIN 100 MG: 100 CAPSULE ORAL at 18:20

## 2018-09-26 RX ADMIN — Medication 10 ML: at 20:54

## 2018-09-26 RX ADMIN — PIPERACILLIN SODIUM AND TAZOBACTAM SODIUM 3.38 G: 3; .375 INJECTION, POWDER, LYOPHILIZED, FOR SOLUTION INTRAVENOUS at 20:54

## 2018-09-26 RX ADMIN — VANCOMYCIN HYDROCHLORIDE 1000 MG: 1 INJECTION, POWDER, LYOPHILIZED, FOR SOLUTION INTRAVENOUS at 12:51

## 2018-09-26 RX ADMIN — VANCOMYCIN HYDROCHLORIDE 1000 MG: 1 INJECTION, POWDER, LYOPHILIZED, FOR SOLUTION INTRAVENOUS at 04:47

## 2018-09-26 RX ADMIN — HYDROCODONE BITARTRATE AND ACETAMINOPHEN 1 TABLET: 5; 325 TABLET ORAL at 19:52

## 2018-09-26 RX ADMIN — GABAPENTIN 100 MG: 100 CAPSULE ORAL at 20:58

## 2018-09-26 RX ADMIN — ENOXAPARIN SODIUM 40 MG: 40 INJECTION, SOLUTION INTRAVENOUS; SUBCUTANEOUS at 13:01

## 2018-09-26 RX ADMIN — Medication 10 ML: at 04:59

## 2018-09-26 RX ADMIN — Medication 10 ML: at 12:52

## 2018-09-26 RX ADMIN — GABAPENTIN 100 MG: 100 CAPSULE ORAL at 08:54

## 2018-09-26 RX ADMIN — PIPERACILLIN SODIUM AND TAZOBACTAM SODIUM 3.38 G: 3; .375 INJECTION, POWDER, LYOPHILIZED, FOR SOLUTION INTRAVENOUS at 12:51

## 2018-09-26 RX ADMIN — Medication 1 CAPSULE: at 08:54

## 2018-09-26 RX ADMIN — HYDROCODONE BITARTRATE AND ACETAMINOPHEN 1 TABLET: 5; 325 TABLET ORAL at 08:55

## 2018-09-26 RX ADMIN — Medication 10 ML: at 12:51

## 2018-09-26 RX ADMIN — LIDOCAINE: 40 CREAM TOPICAL at 08:59

## 2018-09-26 RX ADMIN — HYDROCODONE BITARTRATE AND ACETAMINOPHEN 1 TABLET: 5; 325 TABLET ORAL at 15:58

## 2018-09-26 RX ADMIN — VANCOMYCIN HYDROCHLORIDE 1250 MG: 1 INJECTION, POWDER, LYOPHILIZED, FOR SOLUTION INTRAVENOUS at 20:54

## 2018-09-26 NOTE — PROGRESS NOTES
0710) Bedside shift change report given to Steph Hutchinson RN (oncoming nurse) by Jarold Closs, RN (offgoing nurse). Report included the following information SBAR, Kardex, MAR, Accordion and Recent Results. 0830) Pt sleeping, pleasant and cooperative. Norco and lidocaine cream given for pain at site L arm amputation. [phone], questions about [place] Burly taking so much blood (intrepreted)? \" discuss vancomycin trough, plan for shower and antibiotics. Pt stated lidocaine helps very well with pain. 1920) Bedside shift change report given to Angela Oconnor RN (oncoming nurse) by Steph Hutchinson RN (offgoing nurse). Report included the following information SBAR, Kardex, MAR, Accordion and Recent Results.

## 2018-09-26 NOTE — PROGRESS NOTES
9/26/18: Received msg from ERNEE at [facility], Jason:  [facility] nurse provided an appt for SONALI MORATAYA for pt but there are additional concerns. NN attempted to call back, lmom with call back info. Mel Jamil 
9/27/18:  Email from Evangelist Gates Eat at [facility], requesting LIANA appt be rescheduled; Pt will be DC'd 10/2/18 after long term abx since 8/24/18, for osteomyelitis. 9/28/18: LIANA appt rescheduled for 10/3/18 @ 11:30, [facility] clinic. Email and vm message to Evangelist Gates CM at [facility]./khris

## 2018-09-26 NOTE — PROGRESS NOTES
Problem: Falls - Risk of 
Goal: *Absence of Falls Document Melanie [facility] Fall Risk and appropriate interventions in the flowsheet. Outcome: Progressing Towards Goal 
Fall Risk Interventions: 
  
 
  
 
Medication Interventions: Evaluate medications/consider consulting pharmacy History of Falls Interventions: Evaluate medications/consider consulting pharmacy

## 2018-09-26 NOTE — PROGRESS NOTES
[address] [address] Pharmacy Dosing Services: Antimicrobial Stewardship Daily Doc Consult for antibiotic dosing of vancomycin by Dr. Tori Zamora (continuing from [address] [place] [address]) Indication: osteomyelitis (confirmed on MRI) Day of Therapy 37 (until 10/1; clarified w/MD) Ht Readings from Last 1 Encounters:  
08/30/18 162.6 cm (64\") Wt Readings from Last 1 Encounters:  
08/30/18 64.5 kg (142 lb 3.2 oz) Vancomycin therapy: 
Current maintenance dose: 1000 (mg) every 8 hours. Dose calculated to approximate a therapeutic trough of 15-20 mcg/mL. Last trough level 13.6 mcg/ml @ 1230 on 9/26, ~7.75 hours post dose extrapolates to ~13.1 mcg/mL Plan for level / Adjustment in Therapy:Afebrile, SCr stable, dose increased to 1250 mg IV Q8H for an expected trough of ~16.4 mcg/mL Dose administration notes:   Doses given appropriately as scheduled Other Antimicrobial  
(not dosed by pharmacist) Zosyn 3.375G IV Q8H Cultures 8/16 wound: Pseudomonas (susc pip/tazo) @ Final 
8/16 blood: Negative @ Final 
8/16 wound: Pseudomonas (susc pip/tazo) @ Final  
Serum Creatinine Lab Results Component Value Date/Time Creatinine 0.67 (L) 09/26/2018 05:01 AM  
  
  
Creatinine Clearance Estimated Creatinine Clearance: 110.4 mL/min (based on Cr of 0.67). Temp Temp: 98.1 °F (36.7 °C) WBC Lab Results Component Value Date/Time WBC 4.7 09/25/2018 04:45 AM  
 
  
H/H Lab Results Component Value Date/Time HGB 12.1 09/25/2018 04:45 AM  
 
  
Platelets Lab Results Component Value Date/Time PLATELET 918 53/44/0282 04:45 AM  
 
  
 
 
Pharmacist BRIAN JeterD, BCPS Contact: [phone]

## 2018-09-26 NOTE — PROGRESS NOTES
[facility] [employer] information fax for patient prosthetic arm. A letter was written on patient behalf. Patient gave permission to do so. CM call [facility] to schedule patient appointment for discharge follow-up and also . Melissa Gonzalez [facility] RENEE.

## 2018-09-26 NOTE — PROGRESS NOTES
Problem: Falls - Risk of 
Goal: *Absence of Falls Document Mejia Shadow Fall Risk and appropriate interventions in the flowsheet. Outcome: Progressing Towards Goal 
Fall Risk Interventions: 
  
 
  
 
Medication Interventions: Evaluate medications/consider consulting pharmacy History of Falls Interventions: Evaluate medications/consider consulting pharmacy

## 2018-09-26 NOTE — TELEPHONE ENCOUNTER
Nurse calling (couldn't understand her name on [address] recording)     Needs a call back from us.

## 2018-09-26 NOTE — PROGRESS NOTES
[address] rounds performed. Verified with Lorretta Duverney that she is the RN currently responsible for the care of this patient. The following concerns/changes were discussed: nothing at this time. [address] rounds performed. Verified with Steph Hutchinson that she is the RN currently responsible for the care of this patient. The following concerns/changes were discussed: none at this time.

## 2018-09-26 NOTE — TELEPHONE ENCOUNTER
I returned phone call and scheduled pt for [facility] appointment on 10/2/18 at [facility].  asked to speak with a NN as well. I gave her Jaja Marie office number since other NN is gone at this time. I am routing a message to the provider who will see pt at [facility] for this [facility] appointment. Pt finishes his antibiotics on 10/1/18.  Claudell Mock, RN

## 2018-09-26 NOTE — PROGRESS NOTES
Hospitalist Progress Note NAME: Tiny Route :  1970 MRN:  [id number] Assessment / Plan: 
 
Long term patient transferred from [facility] for completion of antibiotics Update- 
C/w same care plan 
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 
long term IV vancomycin and zosyn through 10/1/2018 ID Dr Tommie Stewart will follow along 
 pain controlled with oral meds 
  
Code Status: full Surrogate Decision Maker: Brenden Mondragon cousin 
  
DVT Prophylaxis: lovenox GI Prophylaxis: not indicated 
  
Baseline: independent Subjective: Chief Complaint / Reason for Physician Visit Pt speaks minimal english obtained information directly from patient \"ok\". Discussed with RN events overnight. Review of Systems: 
Symptom Y/N Comments  Symptom Y/N Comments Fever/Chills    Chest Pain n   
Poor Appetite n   Edema Cough    Abdominal Pain n   
Sputum    Joint Pain y   
SOB/AIKEN n   Pruritis/Rash Nausea/vomit n   Tolerating PT/OT y Diarrhea n   Tolerating Diet y Constipation    Other Could NOT obtain due to:   
 
Objective: VITALS:  
Last 24hrs VS reviewed since prior progress note. Most recent are: 
Patient Vitals for the past 24 hrs: 
 Temp Pulse Resp BP SpO2  
18 0853 98.1 °F (36.7 °C) 76 16 143/73 98 %  
18 0004 97 °F (36.1 °C) 84 16 159/78 -  
18 1611 98 °F (36.7 °C) 87 16 166/89 98 % Intake/Output Summary (Last 24 hours) at 18 1203 Last data filed at 18 0004 Gross per 24 hour Intake                0 ml Output                0 ml Net                0 ml PHYSICAL EXAM: 
General: Alert, cooperative, no acute distress   
EENT:  EOMI. Anicteric sclerae. MMM Resp:  CTA bilaterally, no wheezing or rales. No accessory muscle use CV:  Regular  rhythm,  No edema GI:  Soft, Non distended, Non tender.  +Bowel sounds Neurologic:  Alert and oriented X 3, normal speech,  
 Psych:   Good insight. Not anxious nor agitated Ext:  Left arm stump in dressing Reviewed most current lab test results and cultures  YES Reviewed most current radiology test results   YES Review and summation of old records today    NO Reviewed patient's current orders and MAR    YES 
PMH/SH reviewed - no change compared to H&P 
________________________________________________________________________ Care Plan discussed with: 
  Comments Patient x Family RN x Care Manager x Consultant Multidiciplinary team rounds were held today with , nursing, pharmacist and clinical coordinator. Patient's plan of care was discussed; medications were reviewed and discharge planning was addressed. ________________________________________________________________________ Total NON critical care TIME:  30  Minutes Total CRITICAL CARE TIME Spent:   Minutes non procedure based Comments >50% of visit spent in counseling and coordination of care    
________________________________________________________________________ Jesus Manuel Robertson MD  
 
Procedures: see electronic medical records for all procedures/Xrays and details which were not copied into this note but were reviewed prior to creation of Plan. LABS: 
I reviewed today's most current labs and imaging studies. Pertinent labs include: 
Recent Labs  
   09/25/18 
 0445 WBC  4.7 HGB  12.1 HCT  36.0*  
PLT  255 Recent Labs  
   09/26/18 
 0501  09/25/18 
 0445  09/24/18 
 7846 CREA  0.67*  0.60*  0.60* Signed: Jesus Manuel Robertson MD

## 2018-09-27 LAB
CREAT SERPL-MCNC: 0.67 MG/DL (ref 0.7–1.3)
CRP SERPL-MCNC: 0.39 MG/DL (ref 0–0.6)

## 2018-09-27 PROCEDURE — 74011250637 HC RX REV CODE- 250/637: Performed by: HOSPITALIST

## 2018-09-27 PROCEDURE — 86140 C-REACTIVE PROTEIN: CPT | Performed by: INTERNAL MEDICINE

## 2018-09-27 PROCEDURE — 82565 ASSAY OF CREATININE: CPT | Performed by: HOSPITALIST

## 2018-09-27 PROCEDURE — 36415 COLL VENOUS BLD VENIPUNCTURE: CPT | Performed by: HOSPITALIST

## 2018-09-27 PROCEDURE — 74011250636 HC RX REV CODE- 250/636: Performed by: INTERNAL MEDICINE

## 2018-09-27 PROCEDURE — 74011250637 HC RX REV CODE- 250/637: Performed by: EMERGENCY MEDICINE

## 2018-09-27 PROCEDURE — 74011000258 HC RX REV CODE- 258: Performed by: INTERNAL MEDICINE

## 2018-09-27 PROCEDURE — 74011250636 HC RX REV CODE- 250/636: Performed by: HOSPITALIST

## 2018-09-27 PROCEDURE — 74011000258 HC RX REV CODE- 258: Performed by: HOSPITALIST

## 2018-09-27 RX ADMIN — PIPERACILLIN SODIUM AND TAZOBACTAM SODIUM 3.38 G: 3; .375 INJECTION, POWDER, LYOPHILIZED, FOR SOLUTION INTRAVENOUS at 05:12

## 2018-09-27 RX ADMIN — Medication 10 ML: at 13:18

## 2018-09-27 RX ADMIN — LIDOCAINE: 40 CREAM TOPICAL at 17:50

## 2018-09-27 RX ADMIN — HYDROCODONE BITARTRATE AND ACETAMINOPHEN 1 TABLET: 5; 325 TABLET ORAL at 08:18

## 2018-09-27 RX ADMIN — Medication 10 ML: at 21:00

## 2018-09-27 RX ADMIN — VANCOMYCIN HYDROCHLORIDE 1250 MG: 1 INJECTION, POWDER, LYOPHILIZED, FOR SOLUTION INTRAVENOUS at 22:08

## 2018-09-27 RX ADMIN — Medication 10 ML: at 05:12

## 2018-09-27 RX ADMIN — Medication 10 ML: at 08:57

## 2018-09-27 RX ADMIN — Medication 10 ML: at 08:56

## 2018-09-27 RX ADMIN — GABAPENTIN 100 MG: 100 CAPSULE ORAL at 08:18

## 2018-09-27 RX ADMIN — GABAPENTIN 100 MG: 100 CAPSULE ORAL at 16:56

## 2018-09-27 RX ADMIN — SODIUM CHLORIDE 3.38 G: 900 INJECTION, SOLUTION INTRAVENOUS at 13:18

## 2018-09-27 RX ADMIN — ENOXAPARIN SODIUM 40 MG: 40 INJECTION, SOLUTION INTRAVENOUS; SUBCUTANEOUS at 13:18

## 2018-09-27 RX ADMIN — VANCOMYCIN HYDROCHLORIDE 1250 MG: 1 INJECTION, POWDER, LYOPHILIZED, FOR SOLUTION INTRAVENOUS at 05:09

## 2018-09-27 RX ADMIN — LIDOCAINE: 40 CREAM TOPICAL at 08:18

## 2018-09-27 RX ADMIN — Medication 1 CAPSULE: at 08:18

## 2018-09-27 RX ADMIN — VANCOMYCIN HYDROCHLORIDE 1250 MG: 1 INJECTION, POWDER, LYOPHILIZED, FOR SOLUTION INTRAVENOUS at 13:18

## 2018-09-27 RX ADMIN — SODIUM CHLORIDE 3.38 G: 900 INJECTION, SOLUTION INTRAVENOUS at 22:08

## 2018-09-27 RX ADMIN — HYDROCODONE BITARTRATE AND ACETAMINOPHEN 1 TABLET: 5; 325 TABLET ORAL at 13:17

## 2018-09-27 RX ADMIN — GABAPENTIN 100 MG: 100 CAPSULE ORAL at 22:07

## 2018-09-27 RX ADMIN — Medication 10 ML: at 17:50

## 2018-09-27 RX ADMIN — HYDROCODONE BITARTRATE AND ACETAMINOPHEN 1 TABLET: 5; 325 TABLET ORAL at 22:22

## 2018-09-27 NOTE — PROGRESS NOTES
Hospitalist Progress Note NAME: Johanna Rodriges :  1970 MRN:  [id number] Assessment / Plan: 
 
Long term patient transferred from [facility] for completion of antibiotics Update- 
C/w same care plan D/w Dr Mina Olivo no need for f/up MRI at this point Humerus Osteomyelitis (2018): POA 
long term IV vancomycin and zosyn through 10/1/2018 ID Dr Mina Olivo will follow along 
 pain controlled with oral meds 
  
Code Status: full Surrogate Decision Maker: Brenden Mondragon cousin 
  
DVT Prophylaxis: lovenox GI Prophylaxis: not indicated 
  
Baseline: independent Subjective: Chief Complaint / Reason for Physician Visit Pt speaks minimal english obtained information directly from patient \"ok\". Discussed with RN events overnight. Review of Systems: 
Symptom Y/N Comments  Symptom Y/N Comments Fever/Chills    Chest Pain n   
Poor Appetite n   Edema Cough    Abdominal Pain n   
Sputum    Joint Pain y   
SOB/AIKEN n   Pruritis/Rash Nausea/vomit n   Tolerating PT/OT y Diarrhea n   Tolerating Diet y Constipation    Other Could NOT obtain due to:   
 
Objective: VITALS:  
Last 24hrs VS reviewed since prior progress note. Most recent are: 
Patient Vitals for the past 24 hrs: 
 Temp Pulse Resp BP SpO2  
18 0813 97.7 °F (36.5 °C) 71 12 149/75 99 % 18 0012 97.5 °F (36.4 °C) 69 16 148/68 99 % 18 1948 98.5 °F (36.9 °C) 75 16 (!) 163/106 99 % 18 1822 - - - (!) 165/94 -  
18 1556 97.8 °F (36.6 °C) 82 16 (!) 165/100 99 % Intake/Output Summary (Last 24 hours) at 18 1038 Last data filed at 18 1407 Gross per 24 hour Intake              250 ml Output                0 ml Net              250 ml PHYSICAL EXAM: 
General: Alert, cooperative, no acute distress   
EENT:  EOMI. Anicteric sclerae. MMM Resp:  CTA bilaterally, no wheezing or rales. No accessory muscle use CV:  Regular  rhythm,  No edema GI:  Soft, Non distended, Non tender.  +Bowel sounds Neurologic:  Alert and oriented X 3, normal speech, Psych:   Good insight. Not anxious nor agitated Ext:  Left arm stump in dressing Reviewed most current lab test results and cultures  YES Reviewed most current radiology test results   YES Review and summation of old records today    NO Reviewed patient's current orders and MAR    YES 
PMH/SH reviewed - no change compared to H&P 
________________________________________________________________________ Care Plan discussed with: 
  Comments Patient x Family RN x Care Manager x Consultant Multidiciplinary team rounds were held today with , nursing, pharmacist and clinical coordinator. Patient's plan of care was discussed; medications were reviewed and discharge planning was addressed. ________________________________________________________________________ Total NON critical care TIME:  30  Minutes Total CRITICAL CARE TIME Spent:   Minutes non procedure based Comments >50% of visit spent in counseling and coordination of care    
________________________________________________________________________ Kamryn Batres MD  
 
Procedures: see electronic medical records for all procedures/Xrays and details which were not copied into this note but were reviewed prior to creation of Plan. LABS: 
I reviewed today's most current labs and imaging studies. Pertinent labs include: 
Recent Labs  
   09/25/18 
 0445 WBC  4.7 HGB  12.1 HCT  36.0*  
PLT  255 Recent Labs  
   09/27/18 
 0506  09/26/18 
 0501  09/25/18 
 0445 CREA  0.67*  0.67*  0.60* Signed: Kamryn Batres MD

## 2018-09-27 NOTE — PROGRESS NOTES
0710) Bedside shift change report given to Carson Lin RN (oncoming nurse) by Saud Bradshaw RN (offgoing nurse). Report included the following information SBAR, Kardex, MAR, Accordion and Recent Results. 1756) Discuss with Dr. Dylon Macias if able to give extra dose of lidocaine tonight, pt stated it helps more than the Nocro. Plan to discuss in rounds tomorrow. 1920) Bedside shift change report given to Donna Walton (oncoming nurse) by Carson Lin RN (offgoing nurse). Report included the following information SBAR, Kardex, MAR, Accordion and Recent Results.

## 2018-09-28 LAB — CREAT SERPL-MCNC: 0.58 MG/DL (ref 0.7–1.3)

## 2018-09-28 PROCEDURE — 74011250636 HC RX REV CODE- 250/636: Performed by: HOSPITALIST

## 2018-09-28 PROCEDURE — 74011250637 HC RX REV CODE- 250/637: Performed by: HOSPITALIST

## 2018-09-28 PROCEDURE — 74011000258 HC RX REV CODE- 258: Performed by: HOSPITALIST

## 2018-09-28 PROCEDURE — 74011250637 HC RX REV CODE- 250/637: Performed by: EMERGENCY MEDICINE

## 2018-09-28 PROCEDURE — 82565 ASSAY OF CREATININE: CPT | Performed by: HOSPITALIST

## 2018-09-28 PROCEDURE — 74011000250 HC RX REV CODE- 250: Performed by: HOSPITALIST

## 2018-09-28 PROCEDURE — 36415 COLL VENOUS BLD VENIPUNCTURE: CPT | Performed by: HOSPITALIST

## 2018-09-28 PROCEDURE — 74011250637 HC RX REV CODE- 250/637: Performed by: INTERNAL MEDICINE

## 2018-09-28 PROCEDURE — 74011000250 HC RX REV CODE- 250: Performed by: INTERNAL MEDICINE

## 2018-09-28 PROCEDURE — C1751 CATH, INF, PER/CENT/MIDLINE: HCPCS

## 2018-09-28 RX ORDER — LIDOCAINE 40 MG/G
CREAM TOPICAL 4 TIMES DAILY
Status: DISCONTINUED | OUTPATIENT
Start: 2018-09-28 | End: 2018-10-02 | Stop reason: HOSPADM

## 2018-09-28 RX ORDER — HYDROCODONE BITARTRATE AND ACETAMINOPHEN 5; 325 MG/1; MG/1
1 TABLET ORAL
Status: DISCONTINUED | OUTPATIENT
Start: 2018-09-28 | End: 2018-10-02 | Stop reason: HOSPADM

## 2018-09-28 RX ADMIN — Medication 1 CAPSULE: at 09:05

## 2018-09-28 RX ADMIN — Medication 10 ML: at 20:45

## 2018-09-28 RX ADMIN — Medication 10 ML: at 14:34

## 2018-09-28 RX ADMIN — VANCOMYCIN HYDROCHLORIDE 1250 MG: 1 INJECTION, POWDER, LYOPHILIZED, FOR SOLUTION INTRAVENOUS at 14:34

## 2018-09-28 RX ADMIN — HYDROCODONE BITARTRATE AND ACETAMINOPHEN 1 TABLET: 5; 325 TABLET ORAL at 21:34

## 2018-09-28 RX ADMIN — LIDOCAINE: 40 CREAM TOPICAL at 17:49

## 2018-09-28 RX ADMIN — ENOXAPARIN SODIUM 40 MG: 40 INJECTION, SOLUTION INTRAVENOUS; SUBCUTANEOUS at 14:33

## 2018-09-28 RX ADMIN — GABAPENTIN 100 MG: 100 CAPSULE ORAL at 09:05

## 2018-09-28 RX ADMIN — SODIUM CHLORIDE 3.38 G: 900 INJECTION, SOLUTION INTRAVENOUS at 04:24

## 2018-09-28 RX ADMIN — Medication 10 ML: at 05:00

## 2018-09-28 RX ADMIN — SODIUM CHLORIDE 3.38 G: 900 INJECTION, SOLUTION INTRAVENOUS at 14:34

## 2018-09-28 RX ADMIN — LIDOCAINE: 40 CREAM TOPICAL at 14:28

## 2018-09-28 RX ADMIN — VANCOMYCIN HYDROCHLORIDE 1250 MG: 1 INJECTION, POWDER, LYOPHILIZED, FOR SOLUTION INTRAVENOUS at 21:21

## 2018-09-28 RX ADMIN — GABAPENTIN 100 MG: 100 CAPSULE ORAL at 17:49

## 2018-09-28 RX ADMIN — HYDROCODONE BITARTRATE AND ACETAMINOPHEN 1 TABLET: 5; 325 TABLET ORAL at 14:22

## 2018-09-28 RX ADMIN — GABAPENTIN 100 MG: 100 CAPSULE ORAL at 21:21

## 2018-09-28 RX ADMIN — HYDROCODONE BITARTRATE AND ACETAMINOPHEN 1 TABLET: 5; 325 TABLET ORAL at 09:05

## 2018-09-28 RX ADMIN — VANCOMYCIN HYDROCHLORIDE 1250 MG: 1 INJECTION, POWDER, LYOPHILIZED, FOR SOLUTION INTRAVENOUS at 04:23

## 2018-09-28 RX ADMIN — SODIUM CHLORIDE 3.38 G: 900 INJECTION, SOLUTION INTRAVENOUS at 21:28

## 2018-09-28 RX ADMIN — LIDOCAINE: 40 CREAM TOPICAL at 21:29

## 2018-09-28 RX ADMIN — LIDOCAINE: 40 CREAM TOPICAL at 09:04

## 2018-09-28 NOTE — PROGRESS NOTES
Problem: Falls - Risk of 
Goal: *Absence of Falls Document April Kenrick Fall Risk and appropriate interventions in the flowsheet. Outcome: Progressing Towards Goal 
Fall Risk Interventions: 
  
 
  
 
Medication Interventions: Teach patient to arise slowly History of Falls Interventions: Evaluate medications/consider consulting pharmacy

## 2018-09-28 NOTE — PROGRESS NOTES
Problem: Falls - Risk of 
Goal: *Absence of Falls Document Nicola Palencia Fall Risk and appropriate interventions in the flowsheet. Outcome: Progressing Towards Goal 
Fall Risk Interventions: 
  
 
  
 
Medication Interventions: Teach patient to arise slowly History of Falls Interventions: Evaluate medications/consider consulting pharmacy

## 2018-09-28 NOTE — PROGRESS NOTES
Hospitalist Progress Note NAME: Carmen Sosa :  1970 MRN:  [id number] Assessment / Plan: 
 
Long term patient transferred from [facility] for completion of antibiotics Update- Wean norco,incr lidocaine frequency C/w same care plan D/w Dr Dilcia Tony no need for f/up MRI at this point Humerus Osteomyelitis (2018): POA 
long term IV vancomycin and zosyn through 10/1/2018 ID Dr Dilcia Tony will follow along 
 pain controlled with oral meds 
  
Code Status: full Surrogate Decision Maker: Brenden Mondragon cousin 
  
DVT Prophylaxis: lovenox GI Prophylaxis: not indicated 
  
Baseline: independent Subjective: Chief Complaint / Reason for Physician Visit Pt speaks minimal english obtained information directly from patient \"ok\". Discussed with RN events overnight. Review of Systems: 
Symptom Y/N Comments  Symptom Y/N Comments Fever/Chills    Chest Pain n   
Poor Appetite n   Edema Cough    Abdominal Pain n   
Sputum    Joint Pain y   
SOB/AIKEN n   Pruritis/Rash Nausea/vomit n   Tolerating PT/OT y Diarrhea n   Tolerating Diet y Constipation    Other Could NOT obtain due to:   
 
Objective: VITALS:  
Last 24hrs VS reviewed since prior progress note. Most recent are: 
Patient Vitals for the past 24 hrs: 
 Temp Pulse Resp BP SpO2  
18 0857 97.9 °F (36.6 °C) 73 18 135/69 99 % 18 2225 97.5 °F (36.4 °C) 70 16 140/71 100 % 18 1652 97.5 °F (36.4 °C) 66 12 151/64 99 % Intake/Output Summary (Last 24 hours) at 18 1127 Last data filed at 18 2225 Gross per 24 hour Intake                0 ml Output                0 ml Net                0 ml PHYSICAL EXAM: 
General: Alert, cooperative, no acute distress   
EENT:  EOMI. Anicteric sclerae. MMM Resp:  CTA bilaterally, no wheezing or rales. No accessory muscle use CV:  Regular  rhythm,  No edema GI:  Soft, Non distended, Non tender.  +Bowel sounds Neurologic:  Alert and oriented X 3, normal speech, Psych:   Good insight. Not anxious nor agitated Ext:  Left arm stump in dressing Reviewed most current lab test results and cultures  YES Reviewed most current radiology test results   YES Review and summation of old records today    NO Reviewed patient's current orders and MAR    YES 
PMH/SH reviewed - no change compared to H&P 
________________________________________________________________________ Care Plan discussed with: 
  Comments Patient x Family RN x Care Manager x Consultant Multidiciplinary team rounds were held today with , nursing, pharmacist and clinical coordinator. Patient's plan of care was discussed; medications were reviewed and discharge planning was addressed. ________________________________________________________________________ Total NON critical care TIME:  30  Minutes Total CRITICAL CARE TIME Spent:   Minutes non procedure based Comments >50% of visit spent in counseling and coordination of care    
________________________________________________________________________ Marisol Conway MD  
 
Procedures: see electronic medical records for all procedures/Xrays and details which were not copied into this note but were reviewed prior to creation of Plan. LABS: 
I reviewed today's most current labs and imaging studies. Pertinent labs include: No results for input(s): WBC, HGB, HCT, PLT, HGBEXT, HCTEXT, PLTEXT, HGBEXT, HCTEXT, PLTEXT in the last 72 hours. Recent Labs  
   09/28/18 
 0422  09/27/18 
 0506  09/26/18 
 0501 CREA  0.58*  0.67*  0.67* Signed: Marisol Conway MD

## 2018-09-28 NOTE — PROGRESS NOTES
IDR: Patient medication discuss. E-mail sent to Nurse Navigator for patient follow-up appointment. Patient has a scheduled appointment at [address] [place] [address] on 10/10/18 @ 2:00pm.  Representation states they do not have interpreters at [address] [place] [address]. CM ask about blue phone no answer. Patient cousin speaks minimal English he will be accompany patient to this appointment. CM will confirm this. CM sent e-mail to [facility]  for confirmation on patient cousin assisting during this appointment. [facility]. 
[address]  [address], [address] [phone]. Patient has a schedule appointment for the [facility] 10/3/2018 @ 11:30 (11:15 on avs patient cannot be late). [facility]  [address], [address] [facility]. CM ask [facility] to confirm with patient if cousin or an [place] speaking individual is coming with him to his appointment at [facility] CM. CM will send  Mrs. Charlie Elder e-mail to see if she is able to assist during patient appointment. On 10/10/18 @ 2:00.

## 2018-09-28 NOTE — PROGRESS NOTES
0710) Bedside shift change report given to Macey Razo RN (oncoming nurse) by Donal Kwon RN (offgoing nurse). Report included the following information SBAR, Kardex, MAR, Accordion and Recent Results. 1145) pt dressing moist after shower. Central line dressing change done with Hernando Conroy, student nurse and Macey Razo RN 
1200) Pt downstairs for discharge. 2340) Bedside shift change report given to Jhoan Schneider RN (oncoming nurse) by Macey Razo RN (offgoing nurse). Report included the following information SBAR, Kardex, MAR, Accordion and Recent Results.

## 2018-09-28 NOTE — INTERDISCIPLINARY ROUNDS
46) IDR with Dr. Vee Martins (MD), Neena (pharmacist), Merle Bull and Oracio Pena (), Paulette Marlow (student nurse), and Mayela Abad (RN) to discuss plan of care including pain management--increase lidocaine and decrease Norco, planning for discharge Tuesday since 9 pm antibiotic due.

## 2018-09-29 LAB — CREAT SERPL-MCNC: 0.63 MG/DL (ref 0.7–1.3)

## 2018-09-29 PROCEDURE — 74011000258 HC RX REV CODE- 258: Performed by: HOSPITALIST

## 2018-09-29 PROCEDURE — 36415 COLL VENOUS BLD VENIPUNCTURE: CPT | Performed by: HOSPITALIST

## 2018-09-29 PROCEDURE — 74011250636 HC RX REV CODE- 250/636: Performed by: HOSPITALIST

## 2018-09-29 PROCEDURE — 74011000258 HC RX REV CODE- 258: Performed by: INTERNAL MEDICINE

## 2018-09-29 PROCEDURE — 74011250637 HC RX REV CODE- 250/637: Performed by: HOSPITALIST

## 2018-09-29 PROCEDURE — 74011250637 HC RX REV CODE- 250/637: Performed by: INTERNAL MEDICINE

## 2018-09-29 PROCEDURE — 74011250636 HC RX REV CODE- 250/636: Performed by: INTERNAL MEDICINE

## 2018-09-29 PROCEDURE — 82565 ASSAY OF CREATININE: CPT | Performed by: HOSPITALIST

## 2018-09-29 RX ADMIN — Medication 10 ML: at 14:11

## 2018-09-29 RX ADMIN — VANCOMYCIN HYDROCHLORIDE 1250 MG: 1 INJECTION, POWDER, LYOPHILIZED, FOR SOLUTION INTRAVENOUS at 14:10

## 2018-09-29 RX ADMIN — Medication 1 CAPSULE: at 09:12

## 2018-09-29 RX ADMIN — SODIUM CHLORIDE 3.38 G: 900 INJECTION, SOLUTION INTRAVENOUS at 14:10

## 2018-09-29 RX ADMIN — GABAPENTIN 100 MG: 100 CAPSULE ORAL at 16:37

## 2018-09-29 RX ADMIN — GABAPENTIN 100 MG: 100 CAPSULE ORAL at 09:12

## 2018-09-29 RX ADMIN — LIDOCAINE: 40 CREAM TOPICAL at 09:12

## 2018-09-29 RX ADMIN — Medication 10 ML: at 23:03

## 2018-09-29 RX ADMIN — HYDROCODONE BITARTRATE AND ACETAMINOPHEN 1 TABLET: 5; 325 TABLET ORAL at 23:01

## 2018-09-29 RX ADMIN — LIDOCAINE: 40 CREAM TOPICAL at 17:40

## 2018-09-29 RX ADMIN — VANCOMYCIN HYDROCHLORIDE 1250 MG: 1 INJECTION, POWDER, LYOPHILIZED, FOR SOLUTION INTRAVENOUS at 05:25

## 2018-09-29 RX ADMIN — LIDOCAINE: 40 CREAM TOPICAL at 14:11

## 2018-09-29 RX ADMIN — SODIUM CHLORIDE 3.38 G: 900 INJECTION, SOLUTION INTRAVENOUS at 05:25

## 2018-09-29 RX ADMIN — Medication 10 ML: at 05:25

## 2018-09-29 RX ADMIN — ACETAMINOPHEN 650 MG: 325 TABLET, FILM COATED ORAL at 00:21

## 2018-09-29 RX ADMIN — LIDOCAINE: 40 CREAM TOPICAL at 23:01

## 2018-09-29 RX ADMIN — GABAPENTIN 100 MG: 100 CAPSULE ORAL at 23:01

## 2018-09-29 RX ADMIN — Medication 10 ML: at 23:02

## 2018-09-29 RX ADMIN — ENOXAPARIN SODIUM 40 MG: 40 INJECTION, SOLUTION INTRAVENOUS; SUBCUTANEOUS at 14:10

## 2018-09-29 RX ADMIN — SODIUM CHLORIDE 3.38 G: 900 INJECTION, SOLUTION INTRAVENOUS at 23:01

## 2018-09-29 RX ADMIN — VANCOMYCIN HYDROCHLORIDE 1250 MG: 1 INJECTION, POWDER, LYOPHILIZED, FOR SOLUTION INTRAVENOUS at 23:01

## 2018-09-29 NOTE — PROGRESS NOTES
2035 Charge nurse rounds performed. Verified that Steph Hutchinson is the RN currently responsible for the care of this patient. The following concerns/changes were discussed: none identified at this time.

## 2018-09-29 NOTE — PROGRESS NOTES
0710hrs . Jonh Lamar Bedside and Verbal shift change report given to Lilia Dunham (oncoming nurse) by Domenico Wan (offgoing nurse). Report included the following information SBAR, Kardex, Intake/Output, MAR, Recent Results and Med Rec Status. 1910hrs . Jonh Lamar Bedside and Verbal shift change report given to Gayatri D 25) by Lilia Dunham (offgoing nurse). Report included the following information SBAR, Kardex, Intake/Output, MAR, Recent Results and Med Rec Status.

## 2018-09-29 NOTE — PROGRESS NOTES
Problem: Falls - Risk of 
Goal: *Absence of Falls Document Paul Jaffe Fall Risk and appropriate interventions in the flowsheet. Outcome: Progressing Towards Goal 
Fall Risk Interventions: 
  
 
  
 
Medication Interventions: Patient to call before getting OOB, Evaluate medications/consider consulting pharmacy History of Falls Interventions: Evaluate medications/consider consulting pharmacy

## 2018-09-29 NOTE — PROGRESS NOTES
Problem: Falls - Risk of 
Goal: *Absence of Falls Document Fara Evens Fall Risk and appropriate interventions in the flowsheet. Outcome: Progressing Towards Goal 
Fall Risk Interventions: 
  
 
  
 
Medication Interventions: Patient to call before getting OOB History of Falls Interventions: Evaluate medications/consider consulting pharmacy

## 2018-09-29 NOTE — PROGRESS NOTES
Hospitalist Progress Note NAME: Curtis Liu :  1970 MRN:  [id number] Assessment / Plan: 
 
Long term patient transferred from [facility] for completion of antibiotics Update- Wean norco,incr lidocaine frequency C/w same care plan D/w Dr Aleyda Oconnor no need for f/up MRI at this point Humerus Osteomyelitis (2018): POA 
long term IV vancomycin and zosyn through 10/1/2018 ID Dr Aleyda Oconnor will follow along 
 pain controlled with oral meds 
  
Code Status: full Surrogate Decision Maker: Brenden Mondragon cousin 
  
DVT Prophylaxis: lovenox GI Prophylaxis: not indicated 
  
Baseline: independent Subjective: Chief Complaint / Reason for Physician Visit Pt speaks minimal english obtained information directly from patient \"ok\". Discussed with RN events overnight. Review of Systems: 
Symptom Y/N Comments  Symptom Y/N Comments Fever/Chills    Chest Pain n   
Poor Appetite n   Edema Cough    Abdominal Pain n   
Sputum    Joint Pain y   
SOB/AIKEN n   Pruritis/Rash Nausea/vomit n   Tolerating PT/OT y Diarrhea n   Tolerating Diet y Constipation    Other Could NOT obtain due to:   
 
Objective: VITALS:  
Last 24hrs VS reviewed since prior progress note. Most recent are: 
Patient Vitals for the past 24 hrs: 
 Temp Pulse Resp BP SpO2  
18 0758 98.2 °F (36.8 °C) 70 16 134/65 99 % 18 0331 98.4 °F (36.9 °C) 70 15 130/61 100 % 18 0015 97.9 °F (36.6 °C) 70 17 114/66 99 % 18 2040 98 °F (36.7 °C) 82 16 (!) 164/95 98 %  
18 1430 97.8 °F (36.6 °C) 85 16 (!) 146/93 99 % Intake/Output Summary (Last 24 hours) at 18 1116 Last data filed at 18 8918 Gross per 24 hour Intake             1750 ml Output                0 ml Net             1750 ml PHYSICAL EXAM: 
General: Alert, cooperative, no acute distress   
EENT:  EOMI. Anicteric sclerae. MMM Resp:  CTA bilaterally, no wheezing or rales. No accessory muscle use CV:  Regular  rhythm,  No edema GI:  Soft, Non distended, Non tender.  +Bowel sounds Neurologic:  Alert and oriented X 3, normal speech, Psych:   Good insight. Not anxious nor agitated Ext:  Left arm stump in dressing Reviewed most current lab test results and cultures  YES Reviewed most current radiology test results   YES Review and summation of old records today    NO Reviewed patient's current orders and MAR    YES 
PMH/SH reviewed - no change compared to H&P 
________________________________________________________________________ Care Plan discussed with: 
  Comments Patient x Family RN x Care Manager x Consultant Multidiciplinary team rounds were held today with , nursing, pharmacist and clinical coordinator. Patient's plan of care was discussed; medications were reviewed and discharge planning was addressed. ________________________________________________________________________ Total NON critical care TIME:  30  Minutes Total CRITICAL CARE TIME Spent:   Minutes non procedure based Comments >50% of visit spent in counseling and coordination of care    
________________________________________________________________________ Selvin MD Yonny  
 
Procedures: see electronic medical records for all procedures/Xrays and details which were not copied into this note but were reviewed prior to creation of Plan. LABS: 
I reviewed today's most current labs and imaging studies. Pertinent labs include: No results for input(s): WBC, HGB, HCT, PLT, HGBEXT, HCTEXT, PLTEXT, HGBEXT, HCTEXT, PLTEXT in the last 72 hours. Recent Labs  
   09/29/18 
 0340  09/28/18 
 0422  09/27/18 
 2597 CREA  0.63*  0.58*  0.67* Signed: Selvin Blancas MD

## 2018-09-30 LAB — CREAT SERPL-MCNC: 0.72 MG/DL (ref 0.7–1.3)

## 2018-09-30 PROCEDURE — 36592 COLLECT BLOOD FROM PICC: CPT

## 2018-09-30 PROCEDURE — 36415 COLL VENOUS BLD VENIPUNCTURE: CPT | Performed by: HOSPITALIST

## 2018-09-30 PROCEDURE — 74011000258 HC RX REV CODE- 258: Performed by: INTERNAL MEDICINE

## 2018-09-30 PROCEDURE — 74011250637 HC RX REV CODE- 250/637: Performed by: INTERNAL MEDICINE

## 2018-09-30 PROCEDURE — 82565 ASSAY OF CREATININE: CPT | Performed by: HOSPITALIST

## 2018-09-30 PROCEDURE — 74011250637 HC RX REV CODE- 250/637: Performed by: HOSPITALIST

## 2018-09-30 PROCEDURE — 74011250636 HC RX REV CODE- 250/636: Performed by: HOSPITALIST

## 2018-09-30 PROCEDURE — 74011250636 HC RX REV CODE- 250/636: Performed by: INTERNAL MEDICINE

## 2018-09-30 RX ADMIN — LIDOCAINE: 40 CREAM TOPICAL at 18:55

## 2018-09-30 RX ADMIN — SODIUM CHLORIDE 3.38 G: 900 INJECTION, SOLUTION INTRAVENOUS at 17:07

## 2018-09-30 RX ADMIN — Medication 1 CAPSULE: at 09:47

## 2018-09-30 RX ADMIN — LIDOCAINE: 40 CREAM TOPICAL at 13:00

## 2018-09-30 RX ADMIN — GABAPENTIN 100 MG: 100 CAPSULE ORAL at 09:48

## 2018-09-30 RX ADMIN — VANCOMYCIN HYDROCHLORIDE 1250 MG: 1 INJECTION, POWDER, LYOPHILIZED, FOR SOLUTION INTRAVENOUS at 17:07

## 2018-09-30 RX ADMIN — LIDOCAINE: 40 CREAM TOPICAL at 22:06

## 2018-09-30 RX ADMIN — Medication 10 ML: at 05:35

## 2018-09-30 RX ADMIN — GABAPENTIN 100 MG: 100 CAPSULE ORAL at 17:06

## 2018-09-30 RX ADMIN — Medication 10 ML: at 22:09

## 2018-09-30 RX ADMIN — VANCOMYCIN HYDROCHLORIDE 1250 MG: 1 INJECTION, POWDER, LYOPHILIZED, FOR SOLUTION INTRAVENOUS at 05:35

## 2018-09-30 RX ADMIN — ENOXAPARIN SODIUM 40 MG: 40 INJECTION, SOLUTION INTRAVENOUS; SUBCUTANEOUS at 17:06

## 2018-09-30 RX ADMIN — SODIUM CHLORIDE 3.38 G: 900 INJECTION, SOLUTION INTRAVENOUS at 22:09

## 2018-09-30 RX ADMIN — LIDOCAINE: 40 CREAM TOPICAL at 09:49

## 2018-09-30 RX ADMIN — HYDROCODONE BITARTRATE AND ACETAMINOPHEN 1 TABLET: 5; 325 TABLET ORAL at 18:59

## 2018-09-30 RX ADMIN — SODIUM CHLORIDE 3.38 G: 900 INJECTION, SOLUTION INTRAVENOUS at 05:35

## 2018-09-30 RX ADMIN — VANCOMYCIN HYDROCHLORIDE 1250 MG: 1 INJECTION, POWDER, LYOPHILIZED, FOR SOLUTION INTRAVENOUS at 22:09

## 2018-09-30 RX ADMIN — Medication 10 ML: at 13:00

## 2018-09-30 RX ADMIN — GABAPENTIN 100 MG: 100 CAPSULE ORAL at 22:06

## 2018-09-30 NOTE — PROGRESS NOTES
Problem: Falls - Risk of 
Goal: *Absence of Falls Document Irma Ham Fall Risk and appropriate interventions in the flowsheet. Outcome: Progressing Towards Goal 
Fall Risk Interventions: 
  
 
  
 
Medication Interventions: Teach patient to arise slowly History of Falls Interventions: Room close to nurse's station

## 2018-09-30 NOTE — PROGRESS NOTES
Bedside shift change report given to me (oncoming nurse) by Chaz RN (offgoing nurse). Report included the following information SBAR, Kardex, Intake/Output, MAR, Accordion, Recent Results and Med Rec Status. 1920 Bedside shift change report given to [facility] RN (oncoming nurse) by me (offgoing nurse). Report included the following information SBAR, Kardex, Intake/Output, MAR, Accordion, Recent Results and Med Rec Status.

## 2018-09-30 NOTE — PROGRESS NOTES
Hospitalist Progress Note NAME: Jason Ortega :  1970 MRN:  [id number] Assessment / Plan: 
 
Long term patient transferred from [address] [place] [address] for completion of antibiotics Update- 
Pain controlled No need for MRI repeat according to Sigrid Mendieta (see previous day's notes) Humerus Osteomyelitis (2018): POA 
long term IV vancomycin and zosyn through 10/1/2018 ID Dr Sigrid Mendieta will follow along 
 pain controlled with oral meds 
  
Code Status: full Surrogate Decision Maker: Brenden Mondragon cousin 
  
DVT Prophylaxis: lovenox GI Prophylaxis: not indicated 
  
Baseline: independent Subjective: Chief Complaint / Reason for Physician Visit Pt speaks minimal english obtained information directly from patient \"OK\" Objective: VITALS:  
Last 24hrs VS reviewed since prior progress note. Most recent are: 
Patient Vitals for the past 24 hrs: 
 Temp Pulse Resp BP SpO2  
18 0750 98.1 °F (36.7 °C) 71 18 123/61 99 % 18 2300 98 °F (36.7 °C) 79 16 133/77 99 % 18 1636 98.1 °F (36.7 °C) 75 18 163/85 99 % Intake/Output Summary (Last 24 hours) at 18 1514 Last data filed at 18 2301 Gross per 24 hour Intake              250 ml Output                0 ml Net              250 ml PHYSICAL EXAM: 
Pt is alert and in no distress Lungs CTA with no adventitious sounds CVS S1 S2 normal  
 
 
LABS: 
I reviewed today's most current labs and imaging studies. Pertinent labs include: No results for input(s): WBC, HGB, HCT, PLT, HGBEXT, HCTEXT, PLTEXT, HGBEXT, HCTEXT, PLTEXT in the last 72 hours. Recent Labs  
   18 
 0326  18 
 0340  18 
 0422 CREA  0.72  0.63*  0.58* Signed: Kell Navarrete MD

## 2018-09-30 NOTE — PROGRESS NOTES
1940) Bedside and Verbal shift change report given to YARELIS Ware (oncoming nurse) by Otf Fung RN (offgoing nurse). Report included the following information SBAR, Kardex, Intake/Output, MAR, Accordion, Recent Results and Med Rec Status. 3665) Bedside and Verbal shift change report given to Suzette Nino RN (oncoming nurse) by YARELIS Ware (offgoing nurse). Report included the following information SBAR, Kardex, Intake/Output, MAR, Accordion, Recent Results and Med Rec Status.

## 2018-10-01 LAB — CREAT SERPL-MCNC: 0.67 MG/DL (ref 0.7–1.3)

## 2018-10-01 PROCEDURE — 36415 COLL VENOUS BLD VENIPUNCTURE: CPT | Performed by: HOSPITALIST

## 2018-10-01 PROCEDURE — 36592 COLLECT BLOOD FROM PICC: CPT

## 2018-10-01 PROCEDURE — 74011250637 HC RX REV CODE- 250/637: Performed by: INTERNAL MEDICINE

## 2018-10-01 PROCEDURE — 82565 ASSAY OF CREATININE: CPT | Performed by: HOSPITALIST

## 2018-10-01 PROCEDURE — 74011250636 HC RX REV CODE- 250/636: Performed by: INTERNAL MEDICINE

## 2018-10-01 PROCEDURE — 74011250637 HC RX REV CODE- 250/637: Performed by: HOSPITALIST

## 2018-10-01 PROCEDURE — 74011250636 HC RX REV CODE- 250/636: Performed by: HOSPITALIST

## 2018-10-01 PROCEDURE — 74011000258 HC RX REV CODE- 258: Performed by: INTERNAL MEDICINE

## 2018-10-01 RX ADMIN — LIDOCAINE: 40 CREAM TOPICAL at 21:39

## 2018-10-01 RX ADMIN — LIDOCAINE: 40 CREAM TOPICAL at 09:30

## 2018-10-01 RX ADMIN — VANCOMYCIN HYDROCHLORIDE 1250 MG: 1 INJECTION, POWDER, LYOPHILIZED, FOR SOLUTION INTRAVENOUS at 05:37

## 2018-10-01 RX ADMIN — Medication 10 ML: at 21:39

## 2018-10-01 RX ADMIN — LIDOCAINE: 40 CREAM TOPICAL at 18:21

## 2018-10-01 RX ADMIN — Medication 10 ML: at 05:37

## 2018-10-01 RX ADMIN — VANCOMYCIN HYDROCHLORIDE 1250 MG: 1 INJECTION, POWDER, LYOPHILIZED, FOR SOLUTION INTRAVENOUS at 21:38

## 2018-10-01 RX ADMIN — GABAPENTIN 100 MG: 100 CAPSULE ORAL at 21:46

## 2018-10-01 RX ADMIN — GABAPENTIN 100 MG: 100 CAPSULE ORAL at 09:28

## 2018-10-01 RX ADMIN — ENOXAPARIN SODIUM 40 MG: 40 INJECTION, SOLUTION INTRAVENOUS; SUBCUTANEOUS at 16:53

## 2018-10-01 RX ADMIN — HYDROCODONE BITARTRATE AND ACETAMINOPHEN 1 TABLET: 5; 325 TABLET ORAL at 17:16

## 2018-10-01 RX ADMIN — SODIUM CHLORIDE 3.38 G: 900 INJECTION, SOLUTION INTRAVENOUS at 16:57

## 2018-10-01 RX ADMIN — VANCOMYCIN HYDROCHLORIDE 1250 MG: 1 INJECTION, POWDER, LYOPHILIZED, FOR SOLUTION INTRAVENOUS at 16:57

## 2018-10-01 RX ADMIN — Medication 1 CAPSULE: at 09:28

## 2018-10-01 RX ADMIN — SODIUM CHLORIDE 3.38 G: 900 INJECTION, SOLUTION INTRAVENOUS at 05:37

## 2018-10-01 RX ADMIN — LIDOCAINE: 40 CREAM TOPICAL at 13:00

## 2018-10-01 RX ADMIN — HYDROCODONE BITARTRATE AND ACETAMINOPHEN 1 TABLET: 5; 325 TABLET ORAL at 03:39

## 2018-10-01 RX ADMIN — Medication 10 ML: at 05:38

## 2018-10-01 RX ADMIN — Medication 10 ML: at 13:00

## 2018-10-01 RX ADMIN — SODIUM CHLORIDE 3.38 G: 900 INJECTION, SOLUTION INTRAVENOUS at 21:38

## 2018-10-01 RX ADMIN — GABAPENTIN 100 MG: 100 CAPSULE ORAL at 16:55

## 2018-10-01 NOTE — PROGRESS NOTES
Hospitalist Progress Note NAME: Sharath Cardenas :  1970 MRN:  [id number] Assessment / Plan: 
 
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 
long term IV vancomycin and zosyn through 10/1/2018 ID Dr Pearce Shown will follow along 
 pain controlled with oral meds 
  
Code Status: full Surrogate Decision Maker: Brenden Mondragon, sujatasin 
  
DVT Prophylaxis: lovenox GI Prophylaxis: not indicated 
  
Baseline: independent Subjective: Chief Complaint / Reason for Physician Visit Pt speaks minimal english obtained information directly from patient \"ok\". Discussed with RN events overnight. Review of Systems: 
Symptom Y/N Comments  Symptom Y/N Comments Fever/Chills    Chest Pain n   
Poor Appetite n   Edema Cough    Abdominal Pain n   
Sputum    Joint Pain y   
SOB/AIKEN n   Pruritis/Rash Nausea/vomit n   Tolerating PT/OT y Diarrhea n   Tolerating Diet y Constipation    Other Could NOT obtain due to:   
 
Objective: VITALS:  
Last 24hrs VS reviewed since prior progress note. Most recent are: 
Patient Vitals for the past 24 hrs: 
 Temp Pulse Resp BP SpO2  
10/01/18 0748 97.8 °F (36.6 °C) 68 18 153/77 99 % 10/01/18 0404 98.2 °F (36.8 °C) 79 18 145/63 100 % 18 2031 98.7 °F (37.1 °C) 82 18 174/90 99 % Intake/Output Summary (Last 24 hours) at 10/01/18 1116 Last data filed at 10/01/18 9912 Gross per 24 hour Intake              180 ml Output                1 ml Net              179 ml PHYSICAL EXAM: 
General: Alert, cooperative, no acute distress   
EENT:  EOMI. Anicteric sclerae. MMM Resp:  CTA bilaterally, no wheezing or rales. No accessory muscle use CV:  Regular  rhythm,  No edema GI:  Soft, Non distended, Non tender.  +Bowel sounds Neurologic:  Alert and oriented X 3, normal speech, Psych:   Good insight. Not anxious nor agitated Ext:  Left arm stump in dressing Reviewed most current lab test results and cultures  YES Reviewed most current radiology test results   YES Review and summation of old records today    NO Reviewed patient's current orders and MAR    YES 
PMH/SH reviewed - no change compared to H&P 
________________________________________________________________________ Care Plan discussed with: 
  Comments Patient x Family RN x Care Manager x Consultant Multidiciplinary team rounds were held today with , nursing, pharmacist and clinical coordinator. Patient's plan of care was discussed; medications were reviewed and discharge planning was addressed. ________________________________________________________________________ Total NON critical care TIME:  30  Minutes Total CRITICAL CARE TIME Spent:   Minutes non procedure based Comments >50% of visit spent in counseling and coordination of care    
________________________________________________________________________ Lucien De Souza MD  
 
Procedures: see electronic medical records for all procedures/Xrays and details which were not copied into this note but were reviewed prior to creation of Plan. LABS: 
I reviewed today's most current labs and imaging studies. Pertinent labs include: No results for input(s): WBC, HGB, HCT, PLT, HGBEXT, HCTEXT, PLTEXT, HGBEXT, HCTEXT, PLTEXT in the last 72 hours. Recent Labs 10/01/18 
 0335  09/30/18 
 0326  09/29/18 
 0340 CREA  0.67*  0.72  0.63* Signed: Lucien De Souza MD

## 2018-10-01 NOTE — PROGRESS NOTES
Bedside shift change report given to me (oncoming nurse) by Humphrey Aquino RN (offgoing nurse). Report included the following information SBAR, Kardex, Intake/Output, MAR, Accordion, Recent Results and Med Rec Status. 1930 Bedside shift change report given to Humphrey Aquino RN (oncoming nurse) by me (offgoing nurse). Report included the following information SBAR, Kardex, Intake/Output, MAR, Accordion, Recent Results and Med Rec Status.

## 2018-10-01 NOTE — PROGRESS NOTES
Problem: Falls - Risk of 
Goal: *Absence of Falls Document Irma Ham Fall Risk and appropriate interventions in the flowsheet. Outcome: Progressing Towards Goal 
Fall Risk Interventions: 
  
 
  
 
Medication Interventions: Teach patient to arise slowly History of Falls Interventions: Consult care management for discharge planning

## 2018-10-01 NOTE — ROUTINE PROCESS
Bedside shift change report given to Gayatri Casillas 1154 (oncoming nurse) by Yordan Breaux (offgoing nurse). Report included the following information SBAR, Kardex, Intake/Output, MAR and Recent Results.

## 2018-10-02 VITALS
HEART RATE: 72 BPM | OXYGEN SATURATION: 99 % | RESPIRATION RATE: 16 BRPM | DIASTOLIC BLOOD PRESSURE: 66 MMHG | WEIGHT: 142.2 LBS | BODY MASS INDEX: 24.28 KG/M2 | TEMPERATURE: 97.9 F | SYSTOLIC BLOOD PRESSURE: 133 MMHG | HEIGHT: 64 IN

## 2018-10-02 LAB
CREAT SERPL-MCNC: 0.67 MG/DL (ref 0.7–1.3)
ERYTHROCYTE [DISTWIDTH] IN BLOOD BY AUTOMATED COUNT: 12.6 % (ref 11.5–14.5)
HCT VFR BLD AUTO: 36.7 % (ref 36.6–50.3)
HGB BLD-MCNC: 12.4 G/DL (ref 12.1–17)
MCH RBC QN AUTO: 29.4 PG (ref 26–34)
MCHC RBC AUTO-ENTMCNC: 33.8 G/DL (ref 30–36.5)
MCV RBC AUTO: 87 FL (ref 80–99)
NRBC # BLD: 0 K/UL (ref 0–0.01)
NRBC BLD-RTO: 0 PER 100 WBC
PLATELET # BLD AUTO: 243 K/UL (ref 150–400)
PMV BLD AUTO: 9.4 FL (ref 8.9–12.9)
RBC # BLD AUTO: 4.22 M/UL (ref 4.1–5.7)
WBC # BLD AUTO: 4.7 K/UL (ref 4.1–11.1)

## 2018-10-02 PROCEDURE — 82565 ASSAY OF CREATININE: CPT | Performed by: HOSPITALIST

## 2018-10-02 PROCEDURE — 85027 COMPLETE CBC AUTOMATED: CPT | Performed by: HOSPITALIST

## 2018-10-02 PROCEDURE — 36415 COLL VENOUS BLD VENIPUNCTURE: CPT | Performed by: HOSPITALIST

## 2018-10-02 PROCEDURE — 74011250637 HC RX REV CODE- 250/637: Performed by: HOSPITALIST

## 2018-10-02 RX ORDER — LIDOCAINE 40 MG/G
CREAM TOPICAL 4 TIMES DAILY
Qty: 15 G | Refills: 3 | Status: SHIPPED | OUTPATIENT
Start: 2018-10-02

## 2018-10-02 RX ADMIN — Medication 1 CAPSULE: at 10:31

## 2018-10-02 RX ADMIN — LIDOCAINE: 40 CREAM TOPICAL at 09:33

## 2018-10-02 RX ADMIN — GABAPENTIN 100 MG: 100 CAPSULE ORAL at 09:33

## 2018-10-02 RX ADMIN — Medication 10 ML: at 06:08

## 2018-10-02 NOTE — DISCHARGE SUMMARY
Hospitalist Discharge Summary     Patient ID:  Joseph Glover  [id number]  46 y.o.  1970    PCP on record: Radha Mccray MD    Admit date: 8/24/2018  Discharge date and time: 10/2/2018      Admission Diagnoses: osteomyelitis  Osteomyelitis [facility])    Discharge Diagnoses:    Principal Problem:    Osteomyelitis ([address] (8/16/2018)           Hospital Course:   Wean norco,incr lidocaine frequency   C/w same care plan  D/w Dr Nena Carmen no need for f/up MRI at this point     Humerus Osteomyelitis (8/16/2018): POA  long term IV vancomycin and zosyn through 10/1/2018   ID Dr Nena Carmen will follow along   pain controlled with oral meds        CONSULTATIONS:  None    Excerpted HPI from H&P of Michelle Leal MD:  Joseph Glover is a 46 y.o.  male was sent to us from [facility] for completion of IV abx. He has had h/o traumatic arm amputation at [facility] and Wound progresseively worsened after discharge. He was admitted at [facility] on 8/16. CT in ER has a suggestion of osteomyelitis. MRI confirmed osteomyelitis of the distal humerus amputation stump. No intraosseous abscess. It also showed  Heterogeneous muscle edema includes biceps, triceps, deltoid, rotator cuff, and teres muscles. Cultures growing pseudomonas. Seen by orthopedic and has not favored surgical intervention. Seen by ID and recommended long term abx.     ______________________________________________________________________  DISCHARGE SUMMARY/HOSPITAL COURSE:  for full details see H&P, daily progress notes, labs, consult notes. _______________________________________________________________________  Patient seen and examined by me on discharge day. Pertinent Findings:  Gen:    Not in distress  Chest: Clear lungs  CVS:   Regular rhythm. No edema  Abd:  Soft, not distended, not tender  Neuro:  Alert with good insight.   Oriented to person, place, and time   _______________________________________________________________________  DISCHARGE MEDICATIONS:   Current Discharge Medication List      START taking these medications    Details   lidocaine (XYLOCAINE) 4 % topical cream Apply  to affected area four (4) times daily. Apply to affected area  Qty: 15 g, Refills: 3         STOP taking these medications       piperacillin-tazobactam 3.375 gram IVPB Comments:   Reason for Stopping:         vancomycin 1,000 mg, ADDaptor 1 Device IVPB Comments:   Reason for Stopping:         HYDROcodone-acetaminophen (NORCO) 5-325 mg per tablet Comments:   Reason for Stopping:               My Recommended Diet, Activity, Wound Care, and follow-up labs are listed in the patient's Discharge Insturctions which I have personally completed and reviewed. _______________________________________________________________________  DISPOSITION:     Home with Family: x   Home with HH/PT/OT/RN:    SNF/LTC:    ZACHARY:    OTHER:        Condition at Discharge:  Stable  _______________________________________________________________________  Follow up with:   PCP : Bree Bray MD  Follow-up Information     Follow up With Details 8 Chari Winston MD   [address] [place] [address] Drive  [address] [place] [address] On 10/10/2018  Your appointment time is 2:00pm For your prosthetic Arms. Please follow-up. [address]  [address] [place] [address] Oronocojanett Knapp On 10/3/2018 Your appointment time is 11:15 am. Please don't be late. Care A Chanel Knapp clinic at [facility].  [address] [place] [address] [address] [phone]  [phone]                Total time in minutes spent coordinating this discharge (includes going over instructions, follow-up, prescriptions, and preparing report for sign off to her PCP) :  30 minutes    Signed:  Micheal Mack MD

## 2018-10-02 NOTE — PROGRESS NOTES
Problem: Falls - Risk of 
Goal: *Absence of Falls Document Haim Washington Fall Risk and appropriate interventions in the flowsheet. Outcome: Progressing Towards Goal 
Fall Risk Interventions: 
  
 
  
 
Medication Interventions: Teach patient to arise slowly History of Falls Interventions: Consult care management for discharge planning

## 2018-10-02 NOTE — PROGRESS NOTES
FollowUp Nutrition Assessment: 
   
INTERVENTIONS/RECOMMENDATIONS:  
Meals/Snacks: General/healthful diet:  Continue cardiac diet.  Enc po. Supplements: Commercial supplement:  Continue ensure shake once daily. 
   
ASSESSMENT:  
10/2: Melinda Langston reviewed; med noted for osteomyelitis. Vanc completed yesterday (10/1).  Minimally speaks english.  Pt tolerates a cardiac diet + ensure shakes once daily.  No new nutrition dx at this time. 
   
Diet Order: Cardiac 
% Eaten:  Patient Vitals for the past 72 hrs: 
   % Diet Eaten  
09/29/18 0058 70 % 09/29/18 2011 65 %    
Pertinent Medications: [x] Reviewed []Other: 
Pertinent Labs: [x]Reviewed  []Other: No new labs for review Food Allergies: [x]None []Other:    
Last BM:                     [x]Active     []Hyperactive  []Hypoactive       [] Absent  BS Skin:               [] KHSLXR                        [] Incision                     [x] Breakdown                                     []Edema                       []Other: 
   
Anthropometrics: Height: 5' 4\" (162.6 cm)              Weight: 64.5 kg (142 lb 3.2 oz)                    
                      IBW (%IBW): 59 kg (130 lb) (109.38 %)                  UBW (%UBW):   (  %)   BMI: Body mass index is 24.41 kg/(m^2).                   EOCU BMI is indicative of: 
[]Underweight   [x]Normal   []Overweight   [] Obesity   [] Extreme Obesity (BMI>40) Last Weight Metrics: 
Weight Loss Metrics 10/2/2018 8/23/2018 8/16/2018 8/16/2018 8/16/2018 7/27/2018 Today's Wt 142 lb 3.2 oz 143 lb - 144 lb - 140 lb BMI 24.41 kg/m2 - 24.55 kg/m2 - 24.72 kg/m2 24.03 kg/m2  
   
   
Estimated Nutrition Needs (Based on): 1846 Kcals/day (1420 x 1.3 AF) , 72 g (1.2 g/kg) Protein Carbohydrate:  At Least 130 g/day  Fluids: 1900 mL/day 
   
NUTRITION DIAGNOSES:  
Problem:  Impaired ability to prepare food/meal     
Etiology: related to poor diet, poor compliance, infection    
Signs/Symptoms: as evidenced by BMI, labs, am   
 Previous Nutrition Dx:  [] Resolved              [] Unresolved           [x] Progressing 
   
NUTRITION INTERVENTIONS: 
Meals/Snacks: General/healthful diet   Supplements: Commercial supplement Feeding Assistance: Meal setup        
   
GOAL:  
PO intake > 75% most meals. Previous goal not met. 
   
NUTRITION MONITORING AND EVALUATION Behavioral-Environmental Outcomes: Behavior, Acceptance of assist with eating Food/Nutrient Intake Outcomes: Total energy intake Physical Signs/Symptoms Outcomes: Weight/weight change 
   
Previous Goal Met: 
 [] Met              [] Progressing Towards Goal              [x] Not Progressing Towards Goal  
Previous Recommendations: 
 [] Implemented          [] Not Implemented          [x] Not Applicable 
   
LEARNING NEEDS (Diet, Food/Nutrient-Drug Interaction):  
 [x] None Identified 
 [] Identified and Education Provided/Documented 
 [] Identified and Pt declined/was not appropriate 
   
Cultural, Sikhism, OR Ethnic Dietary Needs:  
 [x] None Identified 
 [] Identified and Addressed 
   
 [x] Interdisciplinary Care Plan Reviewed/Documented  
 [x] Discharge Planning:  Continue cardiac diet as ordered. 
 [x] Participated in Interdisciplinary Rounds 
   
NUTRITION RISK:  
 [x] Patient At Nutritional Risk                
 [] Patient Not At Nutritional Risk 
  
Romina Hernandez, PhD, [facility], [address] [place] [address] , [address] [facility]

## 2018-10-02 NOTE — PROGRESS NOTES
Patient and family given discharge instructions. They verbalized understanding.  Patient ambulated out of hospital.

## 2018-10-02 NOTE — PROGRESS NOTES
Bedside shift change report given to Sara Diana (oncoming nurse) by Lilly Winter (offgoing nurse). Report included the following information SBAR, Kardex, Intake/Output, MAR and Recent Results.

## 2018-10-02 NOTE — PROGRESS NOTES
Patient has a scheduled appointment with Dr. Licha CARDENAS on 10/17/2018 at 3:15. Melissa Gonzalez [facility] CM.

## 2018-10-03 ENCOUNTER — OFFICE VISIT (OUTPATIENT)
Dept: FAMILY MEDICINE CLINIC | Age: 48
End: 2018-10-03

## 2018-10-03 ENCOUNTER — TELEPHONE (OUTPATIENT)
Dept: CASE MANAGEMENT | Age: 48
End: 2018-10-03

## 2018-10-03 ENCOUNTER — PATIENT OUTREACH (OUTPATIENT)
Dept: FAMILY MEDICINE CLINIC | Age: 48
End: 2018-10-03

## 2018-10-03 VITALS
WEIGHT: 139 LBS | HEIGHT: 64 IN | SYSTOLIC BLOOD PRESSURE: 111 MMHG | DIASTOLIC BLOOD PRESSURE: 74 MMHG | HEART RATE: 95 BPM | TEMPERATURE: 98.5 F | BODY MASS INDEX: 23.73 KG/M2

## 2018-10-03 DIAGNOSIS — G89.29 OTHER CHRONIC PAIN: ICD-10-CM

## 2018-10-03 DIAGNOSIS — S48.912A AMPUTATION OF LEFT ARM (HCC): Primary | ICD-10-CM

## 2018-10-03 DIAGNOSIS — F51.5 NIGHTMARE: ICD-10-CM

## 2018-10-03 RX ORDER — AMITRIPTYLINE HYDROCHLORIDE 25 MG/1
25 TABLET, FILM COATED ORAL
Qty: 90 TAB | Refills: 3 | Status: SHIPPED | OUTPATIENT
Start: 2018-10-03

## 2018-10-03 NOTE — TELEPHONE ENCOUNTER
CM call [facility]. Spoke with Estela Rai. Inform patient is Pashto speaking only. Patient has an appointment on [id number]. And  will be at facility during appointment for patient [address]. Ricardo Atkins  [address]  3 [facility]  W: [phone]     mobile: [phone]        Rocky Busby [facility] RENEE.

## 2018-10-03 NOTE — TELEPHONE ENCOUNTER
On follow-up patient had questions relating to his medication. CM called [employer] [phone]. To confirm patient medications. Pharmacist needed the site the medication will be apply to. Dr. Boni Esquivel gave the information. Patient medication will be ready for pick today. [facility] Confirm fax. Melissa Gonzalez [facility] RENEE.

## 2018-10-03 NOTE — PROGRESS NOTES
Pt left the clinic before being called to discharge. Rafael Osuna RN    Tc from Siena Chen RN, NN. She stated she will call the pt and make sure he knows about all of his appt's and addresses.  Rafael Osuna RN

## 2018-10-03 NOTE — PROGRESS NOTES
Coordination of Care  1. Have you been to the ER, urgent care clinic since your last visit? Hospitalized since your last visit? Yes When: [facility], 08/24/2018, osteomyelitis    2. Have you seen or consulted any other health care providers outside of the [address] [place] [address] since your last visit? Include any pap smears or colon screening. No    Does the patient need refills? NO    Learning Assessment Complete?  no

## 2018-10-03 NOTE — PROGRESS NOTES
Hospital Discharge Follow-Up Date/Time:  10/8/2018 1:01 PM 
 
Patient was admitted to [facility] from [facility] on 8/24/18 and discharged on 10/2/18 for osteomyletitis. The physician discharge summary was available at the time of outreach. Patient was contacted within 1 business days of discharge. Pt seen at the [place] [address] for SONALI MORATAYA appt within 1 day. NN attempted to call pt later in the day using [employer] interp - attempted four different numbers, one was a wrong number, left a msg at the second and for the third: spoke to [facility], named on [address] form, who says he does not know where the pt is living; He did take NN phone number and will ask him to call me if he sees him. 4th attempted call was to [facility], named on PHI form: lmom with call back number 10/5/18: No reply from any messages left on 10/3/18. Called again with Joust intepreter, this time home no. answering machine identified pt's name- lm again./khris Top Challenges reviewed with the provider Does pt need Ortho f/u ? PCP noted today that stump is healed Method of communication with provider :chart routing, phone Inpatient RRAT score: not available Was this a readmission? no  
Patient stated reason for the readmission: n/a Summary of patient's top problems: 1. Traumatic amputation - reported nightmares to pcp, rx for Amitryptiline but has not started yet. 2. Uninsured, does not speak Eng and questionable does not read/write - needs much reinforcement and review. 3.  
 
Home Health orders at discharge: none [address]: n/a Barriers to care? Uninsured, does not speak Eng;   
 
Advance Care Planning:  
Does patient have an Advance Directive:  not on file ; Unable to address this due to unable to reach pt Medication(s):  
New Medications at Discharge: Lidocaine 4% topical Cream; PCP ordered Amitryptiline at lex appt 10/3/18 Medication reconciliation was performed with patient by PCP, Dr Governor Chery at [facility] appt. unable to reach pt. [place] Referral to Pharm D needed: no  
 
Current Outpatient Prescriptions Medication Sig  
 amitriptyline (ELAVIL) 25 mg tablet Take 1 Tab by mouth nightly.  lidocaine (XYLOCAINE) 4 % topical cream Apply  to affected area four (4) times daily. Apply to affected area No current facility-administered medications for this visit. There are no discontinued medications. PCP LIANA appt today 10. 3.18 at [facility] 15 w/ Dr Flowers Cool [facility] follow up appointment(s):  
Future Appointments Date Time Provider Chloe Mohini 10/17/2018 4:00 PM Amanda Slade  [facility] follow up appointment(s): [address] 10/10/18 @ 2pm 
 
[facility]:  n/a  
 
 
Goals Post Hospitalization  Attends follow-up appointments as directed. 10/3/18: Will be compliant with appts: 
· PCP seen today for LIANA at [place] [address] · 10/10/18 @ 2pm:  [address], [address] [facility]. [phone] (main number for SA) · NN reached out to [facility] with [address] who agreed that she will meet pt at [facility] appt on 10.10.18.  
· 10/17/18: Dr Kenney Nath, ID at [facility], 4:00pm, arrive at 3:30 
       [address] [facility] [address] [facility], [address] [place] [address] [place]. [phone] · NN attempted x 4 different numbers to reach pt today, unsuccessful. Pt needs review of details re these appts ./khris 
10/5/18:  Attempted to call pt again, lm using [employer] interp assistance./khris 
10/8/18:  Chart review indicates that IP , [facility], spoke to pt on 10/3/18 and gave him instructions for SA appt. , including address. NN attempted again to call pt with assistance of William. afsanehom/khris  
10/8/18: Bilingual NNJolie called pt - spoke to him and provided information about appointments.    Focus on appt 10/10/18 [facility] and plan is to call him back on 10/15/18 with information about ID appt on 10/17/18 w/ Dr Peña Page  Knowledge and adherence of prescribed medication (ie. action, side effects, missed dose, etc.). 10/8/18:  Note from CM at [facility] indicates she spoke to pt on 10/3 and he said he did not get his rx at the [facility] on 10/3/18 when there for his lex appt. And that he planned to go back to [facility]. Appears that pt does not realize that script was sent electronically to [address] [place] [address] Lam[place] will clarify if able to reach him. Attempted to call pt again today, lmom /khris Bilingual NN , Wagner Cardona. spoke to pt today - he requested to p/u meds at [facility] not [facility] and will p/u tomorrow. NN called [facility], made this adjustment so meds will be p/u at this pharmacy. Thelma Knows  Supportive resources in place to maintain patient in the community (ie. Home Health, DME equipment, refer to, medication assistant plan, etc.)   
     
  10/3/18:  NN communicated with Lan Irene from [facility], she will planning to meet pt to interpret for him at appt. 10/10/18 at [facility]. [address]  was sent to SA from [facility] . NN attempted x 2 to reach FA dept to ck status of this - lm x 2. Pt needs review of details related to this appt as well as the appt on 10/17 with Dr Javier Cristina, ID at [facility]. Thelma Waldron 
10/5/18: LM again at [facility] Screening/khris 10/8/18: Called  aleja munguia for assistance with above - \"Summer\" will give msg to [employer] .Thelma Camposs Per Willie Lloyd FA,  Unable to find FA application which was faxed to them from [facility] prior to pt RAMONA RAMSEY CM [facility] will resend it to SA and will follow up with this NN to update./khris

## 2018-10-03 NOTE — PROGRESS NOTES
HISTORY OF PRESENT ILLNESS  Rj Jauregui is a 46 y.o. male. HPI  Patient states that at night he is not doing very well because he starts having nightmares of the stump opening and being infected. He had been asking in the hospital for a prosthetic arm,  An appointment had been scheduled but he did not understand were. He is living here in [place] with some friends and has a cousin. Review of Systems   Constitutional: Negative for chills and fever. Respiratory: Negative for cough, shortness of breath and wheezing. Cardiovascular: Negative for chest pain and palpitations. Gastrointestinal: Negative for abdominal pain, constipation, diarrhea, heartburn, nausea and vomiting. Musculoskeletal: Negative for back pain, falls, joint pain, myalgias and neck pain. Skin: Negative for itching and rash. Psychiatric/Behavioral: Negative for depression, substance abuse and suicidal ideas. + nightmares      /74 (BP 1 Location: Right arm)  Pulse 95  Temp 98.5 °F (36.9 °C) (Oral)   Ht 5' 4.02\" (1.626 m)  Wt 139 lb (63 kg)  BMI 23.85 kg/m2  Physical Exam   Constitutional: He is oriented to person, place, and time. He appears well-developed and well-nourished. Eyes: Conjunctivae and EOM are normal. Pupils are equal, round, and reactive to light. Neck: Normal range of motion. Neck supple. No JVD present. No thyromegaly present. Cardiovascular: Normal rate, regular rhythm and normal heart sounds. No murmur heard. Pulmonary/Chest: Effort normal and breath sounds normal. No respiratory distress. He has no wheezes. He has no rales. He exhibits no tenderness. Abdominal: Bowel sounds are normal. He exhibits no distension and no mass. There is no tenderness. There is no rebound and no guarding. Musculoskeletal:   Above elbow left upper extremity amputation. Stump is healed   Lymphadenopathy:     He has no cervical adenopathy. Neurological: He is alert and oriented to person, place, and time. He has normal reflexes. ASSESSMENT and PLAN  Diagnoses and all orders for this visit:    1. Amputation of left arm ([address]    2. Nightmare    3. Other chronic pain  -     amitriptyline (ELAVIL) 25 mg tablet; Take 1 Tab by mouth nightly.       [facility]

## 2018-10-03 NOTE — TELEPHONE ENCOUNTER
Care Management follow-up call  CM reviewed chart. CM called pt to discuss discharge plan. Pt verified demographics. Pt stated he went to the [facility] on today for his appointment where he was told he was going to get prescriptions but did not receive the receipt so he plans on going back to obtain the scripts. CM told pt about the up coming appointment for [facility] on 10/10/18. CM provided pt with the address for [facility]. No other questions or concerns at this time.          [facility] Jessica MSW, QMHP

## 2018-10-08 NOTE — PROGRESS NOTES
10/8/18 NN text patient the address to FU appointment at [address] [place] [address] on 10/10/18. Patient confirmed he received information.  IM

## 2018-10-11 ENCOUNTER — PATIENT OUTREACH (OUTPATIENT)
Dept: FAMILY MEDICINE CLINIC | Age: 48
End: 2018-10-11

## 2018-10-11 NOTE — PROGRESS NOTES
10/10/18:  Received call from SHAHRIAR Babin  - she went to meet pt for 2pm appt at [facility];  Patient showed up early to appt with a friend  and was in the parking lot when Raf kimany found him - he had already been seen. Per pt  and friend's report to [place]: They (Willie Cho) referred him to [facility], specifically to the [facility]. Pt will not qualify for [address] ; At time of call NN advised, through , that I will research options and have to get back to patient re next steps. 10/11/18:  [facility] - patient can be referred to AN by provider, for prosthetic; They work with [facility], who works on a case by case basis with pts. Not guaranteed completely free but either largely discounted or free. Also AN works with [address] [place] [address] for Therapy so Stephie Elo with AN will talk to her contact at [facility] - they should be able to refer him there if we make the referral through AN. She David Nicole) will also inquire about prosthesis through SA, if referral done through [facility]. NN lm for nurse at [facility] ( Dr Cecille Mcknight nurse:  Need record from [address] notes yesterday and also seeking clarity re if pt referred to [facility] for medical reasons or because pt is uninsured and w/o appropriate documentation. Goal is to simplify as much as possible for the pt, the process for getting prosthesis and therapy./khris 
10/12/18:  Per Casandra Amado w/ [facility]:  Dr Tung Snyder w/ [facility] is not an AN provider; there is not an appropriate provider through AN at [address] [place] [address]. Call back from Dr Piter Lopez nurse - nn requested copy of OV notes; also learned that Keon Thayer from [facility] saw pt with Dr Luz Downey on 10/10/18 at [address] [place] [address].  
Call back from Dr Estrellita Varela, pcp:  Reviewed recent history with Dr Estrellita Varela;  Will try to find Ortho through [facility], who specializes in upper extremities. Also nn to try to reach Vandana Villanueva with [facility]./khris

## 2018-10-15 ENCOUNTER — PATIENT OUTREACH (OUTPATIENT)
Dept: FAMILY MEDICINE CLINIC | Age: 48
End: 2018-10-15

## 2018-10-15 NOTE — Clinical Note
Last night I spoke to the [facility] Maryjo Escalante ) that saw the pt with MD at [place] last wk. I summarized what he said in my note for 10/15. He suggested Dr Neema Yao ( who is Ortho ) yet the referral from [place], Dr Kiel Cedeño to [facility], Dr Espinoza Chávez was for  Phys Med and Rehab. Also,  PT or OT? SA was referring to an OT at [facility]. If you agree, can you start the AN referral, please for either Ortho or Phys Med and therapy .  Thank you, Gertrudis Maddox

## 2018-10-16 NOTE — PROGRESS NOTES
Update re follow up: 
 
Goals Post Hospitalization  Attends follow-up appointments as directed. 10/3/18: Will be compliant with appts: 
· PCP seen today for LIANA at [facility] · 10/10/18 @ 2pm:  [address], [address] [facility]. [phone] (main number for SA) · NN reached out to [facility] with [address] who agreed that she will meet pt at [facility] appt on 10.10.18.  
· 10/17/18: Dr Lia Donahue, ID at [facility], 4:00pm, arrive at 3:30 
       [address] [place] [address]., [facility], [address] [place], [place] [address]way. [phone] · NN attempted x 4 different numbers to reach pt today, unsuccessful. Pt needs review of details re these appts ./khris 
10/5/18:  Attempted to call pt again, lm using tracx interp assistance./khris 
10/8/18:  Chart review indicates that IP CM, [facility], spoke to pt on 10/3/18 and gave him instructions for SA appt. , including address. NN attempted again to call pt with assistance of tracx. , lmom/khris  
10/8/18: Bilingual Erinn ANDRADE called pt - spoke to him and provided information about appointments. Focus on appt 10/10/18 [facility] and plan is to call him back on 10/15/18 with information about ID appt on 10/17/18 w/ Dr Lia Mae  Knowledge and adherence of prescribed medication (ie. action, side effects, missed dose, etc.). 10/8/18:  Note from CM at [facility] indicates she spoke to pt on 10/3 and he said he did not get his rx at the [facility] on 10/3/18 when there for his liana appt. And that he planned to go back to [facility]. Appears that pt does not realize that script was sent electronically to [address] [place], [place] [address]vard will clarify if able to reach him. Attempted to call pt again today, lmom /khris Bilingual Erinn ANDRADE. spoke to pt today - he requested to p/u meds at [facility] not [facility] and will p/u tomorrow. NN called [facility], made this adjustment so meds will be p/u at this pharmacy. Lacy Rodriguez  Supportive resources in place to maintain patient in the community (ie. Home Health, DME equipment, refer to, medication assistant plan, etc.)   
     
  10/3/18:  NN communicated with Lidia Odette from [facility], she will planning to meet pt to interpret for him at appt. 10/10/18 at [address] [facility]. [address] was sent to SA from [facility] . NN attempted x 2 to reach FA dept to ck status of this - lm x 2. Pt needs review of details related to this appt as well as the appt on 10/17 with Dr Pranay Yoon, ID at [address]. Reji Holly 
10/5/18: LM again at [facility] Screening/khris 10/8/18: Called SA main nilda for assistance with above - \"Summer\" will give msg to [employer] .Reji Holly Per [employer], [facility] FA,  Unable to find FA application which was faxed to them from [facility] prior to pt DC. Melissa RAMSEY CM [facility] will resend it to [facility] and will follow up with this NN to update./khris 
10/10/18: Reyes Denise referred him to [facility], [facility]. Pt will not qualify for [facility] due to lack of documentation for residency ; At time of call NN advised, through , that I will research options and have to get back to patient re next steps./khris 
10/11/12: provider can refer to AN, for prosthetic; They work with [facility], who works on a case by case basis with pts. Not guaranteed completely free but either largely discounted or free. AN works with [address] for Therapy, Elizabeth Mcgee with AN will talk to her contact at [facility] - they should be able to refer him there if we make the referral through AN. Elizabeth Mcgee will also inquire about prosthesis through SA, if referral done through [facility]. NN lm for nurse at [facility] ( Dr Alison Kimble nurse:  Need record from [address] notes yesterday and also seeking clarity re if pt referred to [address] [place] [address] for medical reasons or because pt is uninsured and w/o appropriate documentation.   Goal is to simplify as much as possible for the pt, the process for getting prosthesis and therapy./khris 
10/12/18:  Per Jessi Murphy w/ [facility]:  Dr Jamey Christy w/ SA is not an AN provider; there is not an appropriate provider through AN at [address] [place] [address]. Call back from Dr Elena Booker nurse - nn requested copy of OV notes; also learned that Jennifer Lopez from [place] [address] saw pt with Dr Randal Gongora on 10/10/18 at [address] [place] [address]. Call back from Dr Bia Fung, pcp:  Reviewed recent history with Dr Bia Fung;  Will try to find Ortho through [facility], who specializes in upper extremities. Also nn to try to reach Vandana Villanueva with [facility]./khris 10/15/18: Received record from Dr Elena Booker at [facility] and scanned to [facility]. Dr Elena Booker had referred to [facility], Dr Mo Martínez and also to [facility] OT Hand Management w/ \"Nahomy\". NN attempting to reach [address] [place] [address] and Jose with AN./khris 
10/15/18: Discussed pt with Vandana Villanueva, from [facility] who saw pt with Dr Ludy Fuentes at [address] [place] [address] last week. He reports: 
· Pt has like a \"frozen shoulder\", very little movement and will need a lot of therapy before able to get prosthesis. 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 · Plan:  confirmed that pt will not qualify for [address] ; Move forward with [facility]  referral for Physical Medicine / Ortho and OT or PT. Will route to Dr Bia Fung for referral./khris

## 2018-10-17 ENCOUNTER — PATIENT OUTREACH (OUTPATIENT)
Dept: FAMILY MEDICINE CLINIC | Age: 48
End: 2018-10-17

## 2018-10-17 DIAGNOSIS — S48.112D: Primary | ICD-10-CM

## 2018-10-17 PROBLEM — S48.112A: Status: ACTIVE | Noted: 2018-10-17

## 2018-10-19 ENCOUNTER — PATIENT OUTREACH (OUTPATIENT)
Dept: FAMILY MEDICINE CLINIC | Age: 48
End: 2018-10-19

## 2018-12-14 ENCOUNTER — PATIENT OUTREACH (OUTPATIENT)
Dept: FAMILY MEDICINE CLINIC | Age: 48
End: 2018-12-14

## 2018-12-14 NOTE — PROGRESS NOTES
Transition of Care follow up:    Update:  Pt was No show for Infectious Disease appt with Dr Zuleika Emery, 10/17/18. Care [facility] Worker attempted to reach pt re [facility] Referral, lm asking him to call back  - pt did not return her call so AN referral has not been done yet. Find out if pt has received care anywhere else? If not revisit getting pt back to the [place] [address] and try again to get AN referral started. Attempted to call pt at two different numbers: One was NA and no vm;  Lm using [employer] on the second number called, asking pt to call back NN . Bilingual NN , Whit, to send a Sp getting in touch letter to patient/khris    Close LIANA episode today. If patient reappears at [facility] or calls back, consider CCM if appropriate with focus on getting patient follow up for Rehab and progress toward prosthesis, through [facility].    Confirm that ID follow up is not needed or refer back to ID if needed./khris
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
## 2019-12-30 NOTE — PROGRESS NOTES
Bedside and Verbal shift change report given to [employer] [facility] (oncoming nurse) by NORIS Qureshi RN (offgoing nurse). Report given with SBAR, Kardex, Intake/Output, MAR and Recent Results. sharp
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
